# Patient Record
Sex: FEMALE | Race: WHITE | Employment: UNEMPLOYED | URBAN - METROPOLITAN AREA
[De-identification: names, ages, dates, MRNs, and addresses within clinical notes are randomized per-mention and may not be internally consistent; named-entity substitution may affect disease eponyms.]

---

## 2017-01-01 ENCOUNTER — APPOINTMENT (OUTPATIENT)
Dept: INFUSION THERAPY | Age: 62
End: 2017-01-01

## 2017-01-01 ENCOUNTER — NURSE NAVIGATOR (OUTPATIENT)
Dept: PALLATIVE CARE | Age: 62
End: 2017-01-01

## 2017-01-01 ENCOUNTER — HOSPITAL ENCOUNTER (OUTPATIENT)
Dept: CT IMAGING | Age: 62
Discharge: HOME | End: 2017-06-23
Attending: INTERNAL MEDICINE
Payer: COMMERCIAL

## 2017-01-01 ENCOUNTER — TELEPHONE (OUTPATIENT)
Dept: PALLATIVE CARE | Age: 62
End: 2017-01-01

## 2017-01-01 ENCOUNTER — DOCUMENTATION ONLY (OUTPATIENT)
Dept: PALLATIVE CARE | Age: 62
End: 2017-01-01

## 2017-01-01 ENCOUNTER — TELEPHONE (OUTPATIENT)
Dept: ONCOLOGY | Age: 62
End: 2017-01-01

## 2017-01-01 ENCOUNTER — OFFICE VISIT (OUTPATIENT)
Dept: ONCOLOGY | Age: 62
End: 2017-01-01

## 2017-01-01 ENCOUNTER — HOSPITAL ENCOUNTER (OUTPATIENT)
Dept: INTERVENTIONAL RADIOLOGY/VASCULAR | Age: 62
Discharge: HOME | End: 2017-09-20
Attending: INTERNAL MEDICINE | Admitting: RADIOLOGY
Payer: COMMERCIAL

## 2017-01-01 ENCOUNTER — APPOINTMENT (OUTPATIENT)
Dept: INFUSION THERAPY | Age: 62
End: 2017-01-01
Payer: COMMERCIAL

## 2017-01-01 ENCOUNTER — ANESTHESIA EVENT (OUTPATIENT)
Dept: ENDOSCOPY | Age: 62
DRG: 423 | End: 2017-01-01
Payer: COMMERCIAL

## 2017-01-01 ENCOUNTER — OFFICE VISIT (OUTPATIENT)
Dept: PALLATIVE CARE | Age: 62
End: 2017-01-01

## 2017-01-01 ENCOUNTER — DOCUMENTATION ONLY (OUTPATIENT)
Dept: ONCOLOGY | Age: 62
End: 2017-01-01

## 2017-01-01 ENCOUNTER — HOSPITAL ENCOUNTER (OUTPATIENT)
Dept: INFUSION THERAPY | Age: 62
Discharge: HOME | End: 2017-07-06
Payer: COMMERCIAL

## 2017-01-01 ENCOUNTER — HOSPITAL ENCOUNTER (EMERGENCY)
Age: 62
Discharge: HOME | End: 2017-07-17
Attending: EMERGENCY MEDICINE
Payer: COMMERCIAL

## 2017-01-01 ENCOUNTER — HOSPITAL ENCOUNTER (INPATIENT)
Age: 62
LOS: 6 days | Discharge: HOME | DRG: 423 | End: 2017-05-14
Attending: STUDENT IN AN ORGANIZED HEALTH CARE EDUCATION/TRAINING PROGRAM | Admitting: FAMILY MEDICINE
Payer: COMMERCIAL

## 2017-01-01 ENCOUNTER — HOSPITAL ENCOUNTER (OUTPATIENT)
Dept: INFUSION THERAPY | Age: 62
Discharge: HOME | End: 2017-08-17
Payer: COMMERCIAL

## 2017-01-01 ENCOUNTER — PATIENT MESSAGE (OUTPATIENT)
Dept: PALLATIVE CARE | Age: 62
End: 2017-01-01

## 2017-01-01 ENCOUNTER — PATIENT OUTREACH (OUTPATIENT)
Dept: ONCOLOGY | Age: 62
End: 2017-01-01

## 2017-01-01 ENCOUNTER — ANESTHESIA (OUTPATIENT)
Dept: ENDOSCOPY | Age: 62
DRG: 423 | End: 2017-01-01
Payer: COMMERCIAL

## 2017-01-01 ENCOUNTER — APPOINTMENT (OUTPATIENT)
Dept: INTERVENTIONAL RADIOLOGY/VASCULAR | Age: 62
DRG: 423 | End: 2017-01-01
Attending: NURSE PRACTITIONER
Payer: COMMERCIAL

## 2017-01-01 ENCOUNTER — CLINICAL SUPPORT (OUTPATIENT)
Dept: ONCOLOGY | Age: 62
End: 2017-01-01

## 2017-01-01 ENCOUNTER — APPOINTMENT (OUTPATIENT)
Dept: CT IMAGING | Age: 62
DRG: 423 | End: 2017-01-01
Attending: NURSE PRACTITIONER
Payer: COMMERCIAL

## 2017-01-01 ENCOUNTER — APPOINTMENT (OUTPATIENT)
Dept: CT IMAGING | Age: 62
DRG: 423 | End: 2017-01-01
Attending: STUDENT IN AN ORGANIZED HEALTH CARE EDUCATION/TRAINING PROGRAM
Payer: COMMERCIAL

## 2017-01-01 ENCOUNTER — HOSPITAL ENCOUNTER (OUTPATIENT)
Dept: INFUSION THERAPY | Age: 62
Discharge: HOME | End: 2017-06-20
Payer: COMMERCIAL

## 2017-01-01 ENCOUNTER — HOSPITAL ENCOUNTER (OUTPATIENT)
Dept: VASCULAR SURGERY | Age: 62
Discharge: HOME | End: 2017-12-05
Attending: INTERNAL MEDICINE
Payer: COMMERCIAL

## 2017-01-01 ENCOUNTER — HOSPITAL ENCOUNTER (OUTPATIENT)
Dept: INFUSION THERAPY | Age: 62
Discharge: HOME | End: 2017-07-08
Payer: COMMERCIAL

## 2017-01-01 ENCOUNTER — HOSPITAL ENCOUNTER (OUTPATIENT)
Dept: INFUSION THERAPY | Age: 62
Discharge: HOME | End: 2017-08-03
Payer: COMMERCIAL

## 2017-01-01 ENCOUNTER — APPOINTMENT (OUTPATIENT)
Dept: ULTRASOUND IMAGING | Age: 62
DRG: 423 | End: 2017-01-01
Attending: INTERNAL MEDICINE
Payer: COMMERCIAL

## 2017-01-01 ENCOUNTER — HOSPITAL ENCOUNTER (OUTPATIENT)
Dept: INFUSION THERAPY | Age: 62
Discharge: HOME | End: 2017-08-31
Payer: COMMERCIAL

## 2017-01-01 ENCOUNTER — HOSPITAL ENCOUNTER (OUTPATIENT)
Dept: INFUSION THERAPY | Age: 62
Discharge: HOME | End: 2017-07-20
Payer: COMMERCIAL

## 2017-01-01 ENCOUNTER — HOSPITAL ENCOUNTER (OUTPATIENT)
Dept: MAMMOGRAPHY | Age: 62
Discharge: HOME | End: 2017-05-05
Attending: FAMILY MEDICINE
Payer: COMMERCIAL

## 2017-01-01 ENCOUNTER — HOSPITAL ENCOUNTER (OUTPATIENT)
Dept: CT IMAGING | Age: 62
Discharge: HOME | End: 2017-06-02
Attending: INTERNAL MEDICINE
Payer: COMMERCIAL

## 2017-01-01 VITALS
SYSTOLIC BLOOD PRESSURE: 148 MMHG | HEIGHT: 67 IN | TEMPERATURE: 96.8 F | RESPIRATION RATE: 18 BRPM | WEIGHT: 185 LBS | BODY MASS INDEX: 29.03 KG/M2 | OXYGEN SATURATION: 100 % | HEART RATE: 58 BPM | DIASTOLIC BLOOD PRESSURE: 54 MMHG

## 2017-01-01 VITALS
WEIGHT: 196 LBS | SYSTOLIC BLOOD PRESSURE: 131 MMHG | HEART RATE: 67 BPM | RESPIRATION RATE: 16 BRPM | BODY MASS INDEX: 30.76 KG/M2 | OXYGEN SATURATION: 97 % | DIASTOLIC BLOOD PRESSURE: 66 MMHG | TEMPERATURE: 96.2 F | HEIGHT: 67 IN

## 2017-01-01 VITALS
HEIGHT: 67 IN | TEMPERATURE: 97.5 F | BODY MASS INDEX: 32.91 KG/M2 | SYSTOLIC BLOOD PRESSURE: 149 MMHG | RESPIRATION RATE: 16 BRPM | DIASTOLIC BLOOD PRESSURE: 82 MMHG | HEART RATE: 65 BPM | OXYGEN SATURATION: 95 % | WEIGHT: 209.7 LBS

## 2017-01-01 VITALS
SYSTOLIC BLOOD PRESSURE: 166 MMHG | TEMPERATURE: 96.6 F | BODY MASS INDEX: 32.27 KG/M2 | WEIGHT: 205.6 LBS | RESPIRATION RATE: 16 BRPM | DIASTOLIC BLOOD PRESSURE: 85 MMHG | HEART RATE: 59 BPM | HEIGHT: 67 IN | OXYGEN SATURATION: 99 %

## 2017-01-01 VITALS
DIASTOLIC BLOOD PRESSURE: 82 MMHG | OXYGEN SATURATION: 96 % | RESPIRATION RATE: 18 BRPM | TEMPERATURE: 97.1 F | HEART RATE: 77 BPM | BODY MASS INDEX: 30.07 KG/M2 | HEIGHT: 67 IN | SYSTOLIC BLOOD PRESSURE: 130 MMHG | WEIGHT: 191.6 LBS

## 2017-01-01 VITALS
RESPIRATION RATE: 12 BRPM | WEIGHT: 193.2 LBS | OXYGEN SATURATION: 95 % | SYSTOLIC BLOOD PRESSURE: 132 MMHG | HEIGHT: 67 IN | TEMPERATURE: 98.2 F | HEART RATE: 62 BPM | DIASTOLIC BLOOD PRESSURE: 78 MMHG | BODY MASS INDEX: 30.32 KG/M2

## 2017-01-01 VITALS
OXYGEN SATURATION: 98 % | DIASTOLIC BLOOD PRESSURE: 77 MMHG | TEMPERATURE: 97.9 F | SYSTOLIC BLOOD PRESSURE: 112 MMHG | WEIGHT: 197.6 LBS | RESPIRATION RATE: 16 BRPM | HEIGHT: 67 IN | HEART RATE: 67 BPM | BODY MASS INDEX: 31.01 KG/M2

## 2017-01-01 VITALS
HEART RATE: 54 BPM | DIASTOLIC BLOOD PRESSURE: 62 MMHG | BODY MASS INDEX: 30.45 KG/M2 | RESPIRATION RATE: 16 BRPM | WEIGHT: 194 LBS | HEIGHT: 67 IN | SYSTOLIC BLOOD PRESSURE: 149 MMHG | OXYGEN SATURATION: 100 %

## 2017-01-01 VITALS
TEMPERATURE: 97.9 F | WEIGHT: 192 LBS | RESPIRATION RATE: 20 BRPM | HEART RATE: 64 BPM | SYSTOLIC BLOOD PRESSURE: 116 MMHG | HEIGHT: 67 IN | BODY MASS INDEX: 30.13 KG/M2 | DIASTOLIC BLOOD PRESSURE: 76 MMHG | OXYGEN SATURATION: 98 %

## 2017-01-01 VITALS
WEIGHT: 192 LBS | HEIGHT: 67 IN | DIASTOLIC BLOOD PRESSURE: 79 MMHG | TEMPERATURE: 97.8 F | BODY MASS INDEX: 30.13 KG/M2 | RESPIRATION RATE: 18 BRPM | SYSTOLIC BLOOD PRESSURE: 134 MMHG | OXYGEN SATURATION: 97 % | HEART RATE: 73 BPM

## 2017-01-01 VITALS
RESPIRATION RATE: 16 BRPM | HEART RATE: 78 BPM | OXYGEN SATURATION: 99 % | TEMPERATURE: 98 F | WEIGHT: 193.8 LBS | DIASTOLIC BLOOD PRESSURE: 70 MMHG | HEIGHT: 67 IN | SYSTOLIC BLOOD PRESSURE: 122 MMHG | BODY MASS INDEX: 30.42 KG/M2

## 2017-01-01 VITALS
HEIGHT: 67 IN | RESPIRATION RATE: 18 BRPM | BODY MASS INDEX: 30.61 KG/M2 | SYSTOLIC BLOOD PRESSURE: 115 MMHG | WEIGHT: 195 LBS | HEART RATE: 65 BPM | DIASTOLIC BLOOD PRESSURE: 68 MMHG | OXYGEN SATURATION: 98 % | TEMPERATURE: 98.1 F

## 2017-01-01 VITALS
SYSTOLIC BLOOD PRESSURE: 99 MMHG | TEMPERATURE: 97.3 F | DIASTOLIC BLOOD PRESSURE: 71 MMHG | HEIGHT: 67 IN | BODY MASS INDEX: 29.98 KG/M2 | WEIGHT: 191 LBS | OXYGEN SATURATION: 99 % | RESPIRATION RATE: 20 BRPM | HEART RATE: 70 BPM

## 2017-01-01 VITALS
WEIGHT: 189.6 LBS | RESPIRATION RATE: 16 BRPM | SYSTOLIC BLOOD PRESSURE: 136 MMHG | BODY MASS INDEX: 29.76 KG/M2 | HEART RATE: 64 BPM | OXYGEN SATURATION: 90 % | HEIGHT: 67 IN | DIASTOLIC BLOOD PRESSURE: 80 MMHG | TEMPERATURE: 95.5 F

## 2017-01-01 VITALS
SYSTOLIC BLOOD PRESSURE: 128 MMHG | BODY MASS INDEX: 37.11 KG/M2 | OXYGEN SATURATION: 99 % | DIASTOLIC BLOOD PRESSURE: 68 MMHG | HEART RATE: 54 BPM | RESPIRATION RATE: 16 BRPM | TEMPERATURE: 96.8 F | WEIGHT: 189 LBS | HEIGHT: 60 IN

## 2017-01-01 VITALS
OXYGEN SATURATION: 98 % | DIASTOLIC BLOOD PRESSURE: 77 MMHG | TEMPERATURE: 97.8 F | SYSTOLIC BLOOD PRESSURE: 137 MMHG | RESPIRATION RATE: 18 BRPM | HEART RATE: 82 BPM

## 2017-01-01 VITALS
OXYGEN SATURATION: 98 % | BODY MASS INDEX: 30.97 KG/M2 | WEIGHT: 197.3 LBS | HEIGHT: 67 IN | SYSTOLIC BLOOD PRESSURE: 143 MMHG | RESPIRATION RATE: 16 BRPM | HEART RATE: 60 BPM | DIASTOLIC BLOOD PRESSURE: 84 MMHG | TEMPERATURE: 97.4 F

## 2017-01-01 VITALS
OXYGEN SATURATION: 98 % | HEART RATE: 54 BPM | SYSTOLIC BLOOD PRESSURE: 129 MMHG | RESPIRATION RATE: 16 BRPM | DIASTOLIC BLOOD PRESSURE: 74 MMHG | TEMPERATURE: 98 F

## 2017-01-01 VITALS
TEMPERATURE: 97.4 F | WEIGHT: 195.8 LBS | HEART RATE: 77 BPM | SYSTOLIC BLOOD PRESSURE: 120 MMHG | HEIGHT: 67 IN | RESPIRATION RATE: 20 BRPM | BODY MASS INDEX: 30.73 KG/M2 | OXYGEN SATURATION: 98 % | DIASTOLIC BLOOD PRESSURE: 72 MMHG

## 2017-01-01 VITALS
OXYGEN SATURATION: 96 % | TEMPERATURE: 98 F | SYSTOLIC BLOOD PRESSURE: 124 MMHG | WEIGHT: 192.7 LBS | RESPIRATION RATE: 16 BRPM | HEART RATE: 70 BPM | HEIGHT: 66 IN | DIASTOLIC BLOOD PRESSURE: 78 MMHG | BODY MASS INDEX: 30.97 KG/M2

## 2017-01-01 VITALS
DIASTOLIC BLOOD PRESSURE: 76 MMHG | TEMPERATURE: 97.1 F | OXYGEN SATURATION: 95 % | HEART RATE: 72 BPM | WEIGHT: 191.1 LBS | RESPIRATION RATE: 16 BRPM | HEIGHT: 67 IN | SYSTOLIC BLOOD PRESSURE: 136 MMHG | BODY MASS INDEX: 29.99 KG/M2

## 2017-01-01 VITALS
DIASTOLIC BLOOD PRESSURE: 61 MMHG | TEMPERATURE: 96.3 F | HEIGHT: 67 IN | SYSTOLIC BLOOD PRESSURE: 142 MMHG | WEIGHT: 197.6 LBS | RESPIRATION RATE: 16 BRPM | OXYGEN SATURATION: 97 % | BODY MASS INDEX: 31.01 KG/M2 | HEART RATE: 60 BPM

## 2017-01-01 VITALS — BODY MASS INDEX: 36.76 KG/M2 | WEIGHT: 188.2 LBS

## 2017-01-01 VITALS
OXYGEN SATURATION: 99 % | RESPIRATION RATE: 18 BRPM | TEMPERATURE: 98 F | SYSTOLIC BLOOD PRESSURE: 115 MMHG | HEART RATE: 68 BPM | DIASTOLIC BLOOD PRESSURE: 76 MMHG

## 2017-01-01 VITALS
WEIGHT: 187.6 LBS | RESPIRATION RATE: 16 BRPM | HEIGHT: 67 IN | OXYGEN SATURATION: 98 % | SYSTOLIC BLOOD PRESSURE: 107 MMHG | HEART RATE: 62 BPM | TEMPERATURE: 98.4 F | BODY MASS INDEX: 29.44 KG/M2 | DIASTOLIC BLOOD PRESSURE: 68 MMHG

## 2017-01-01 VITALS
SYSTOLIC BLOOD PRESSURE: 125 MMHG | HEIGHT: 67 IN | HEART RATE: 74 BPM | WEIGHT: 193 LBS | BODY MASS INDEX: 30.29 KG/M2 | DIASTOLIC BLOOD PRESSURE: 77 MMHG | OXYGEN SATURATION: 98 % | TEMPERATURE: 98.1 F | RESPIRATION RATE: 16 BRPM

## 2017-01-01 DIAGNOSIS — R10.84 GENERALIZED ABDOMINAL PAIN: ICD-10-CM

## 2017-01-01 DIAGNOSIS — K59.03 DRUG-INDUCED CONSTIPATION: ICD-10-CM

## 2017-01-01 DIAGNOSIS — R63.0 POOR APPETITE: ICD-10-CM

## 2017-01-01 DIAGNOSIS — R10.11 ABDOMINAL PAIN, RIGHT UPPER QUADRANT: ICD-10-CM

## 2017-01-01 DIAGNOSIS — T45.1X5A CINV (CHEMOTHERAPY-INDUCED NAUSEA AND VOMITING): Primary | ICD-10-CM

## 2017-01-01 DIAGNOSIS — R11.0 NAUSEA WITHOUT VOMITING: ICD-10-CM

## 2017-01-01 DIAGNOSIS — C25.1 MALIGNANT NEOPLASM OF BODY OF PANCREAS (HCC): ICD-10-CM

## 2017-01-01 DIAGNOSIS — M79.10 MYALGIA: ICD-10-CM

## 2017-01-01 DIAGNOSIS — C25.1 MALIGNANT NEOPLASM OF BODY OF PANCREAS (HCC): Primary | ICD-10-CM

## 2017-01-01 DIAGNOSIS — R10.84 ABDOMINAL PAIN, GENERALIZED: Primary | ICD-10-CM

## 2017-01-01 DIAGNOSIS — R11.2 CINV (CHEMOTHERAPY-INDUCED NAUSEA AND VOMITING): Primary | ICD-10-CM

## 2017-01-01 DIAGNOSIS — F32.A DEPRESSION, UNSPECIFIED DEPRESSION TYPE: ICD-10-CM

## 2017-01-01 DIAGNOSIS — C25.7 MALIGNANT NEOPLASM OF OTHER PARTS OF PANCREAS (HCC): ICD-10-CM

## 2017-01-01 DIAGNOSIS — M79.89 LEG SWELLING: ICD-10-CM

## 2017-01-01 DIAGNOSIS — G89.3 CANCER ASSOCIATED PAIN: ICD-10-CM

## 2017-01-01 DIAGNOSIS — C25.9 MALIGNANT NEOPLASM OF PANCREAS, UNSPECIFIED LOCATION OF MALIGNANCY (HCC): ICD-10-CM

## 2017-01-01 DIAGNOSIS — C25.7 MALIGNANT NEOPLASM OF OTHER PARTS OF PANCREAS (HCC): Primary | ICD-10-CM

## 2017-01-01 DIAGNOSIS — K86.89 PANCREATIC MASS: ICD-10-CM

## 2017-01-01 DIAGNOSIS — R53.0 NEOPLASTIC MALIGNANT RELATED FATIGUE: ICD-10-CM

## 2017-01-01 DIAGNOSIS — R10.11 ABDOMINAL PAIN, RIGHT UPPER QUADRANT: Primary | ICD-10-CM

## 2017-01-01 DIAGNOSIS — R10.10 PAIN OF UPPER ABDOMEN: ICD-10-CM

## 2017-01-01 DIAGNOSIS — R10.84 ABDOMINAL PAIN, GENERALIZED: ICD-10-CM

## 2017-01-01 DIAGNOSIS — C25.9 MALIGNANT NEOPLASM OF PANCREAS, UNSPECIFIED LOCATION OF MALIGNANCY (HCC): Primary | ICD-10-CM

## 2017-01-01 DIAGNOSIS — K59.03 DRUG INDUCED CONSTIPATION: ICD-10-CM

## 2017-01-01 DIAGNOSIS — R25.2 SPASMS OF THE HANDS OR FEET: Primary | ICD-10-CM

## 2017-01-01 DIAGNOSIS — R63.4 UNINTENTIONAL WEIGHT LOSS: ICD-10-CM

## 2017-01-01 DIAGNOSIS — R11.0 CHEMOTHERAPY-INDUCED NAUSEA: ICD-10-CM

## 2017-01-01 DIAGNOSIS — Z13.820 SCREENING FOR OSTEOPOROSIS: ICD-10-CM

## 2017-01-01 DIAGNOSIS — T45.1X5A CHEMOTHERAPY-INDUCED NAUSEA: ICD-10-CM

## 2017-01-01 LAB
ALBUMIN SERPL BCP-MCNC: 3.4 G/DL (ref 3.5–5)
ALBUMIN SERPL BCP-MCNC: 3.5 G/DL (ref 3.5–5)
ALBUMIN SERPL BCP-MCNC: 3.6 G/DL (ref 3.5–5)
ALBUMIN SERPL BCP-MCNC: 4.3 G/DL (ref 3.5–5)
ALBUMIN/GLOB SERPL: 1.2 {RATIO} (ref 1.1–2.2)
ALBUMIN/GLOB SERPL: 1.3 {RATIO} (ref 1.1–2.2)
ALBUMIN/GLOB SERPL: 1.4 {RATIO} (ref 1.1–2.2)
ALBUMIN/GLOB SERPL: 1.4 {RATIO} (ref 1.1–2.2)
ALP SERPL-CCNC: 101 U/L (ref 45–117)
ALP SERPL-CCNC: 77 U/L (ref 45–117)
ALP SERPL-CCNC: 80 U/L (ref 45–117)
ALP SERPL-CCNC: 81 U/L (ref 45–117)
ALP SERPL-CCNC: 91 U/L (ref 45–117)
ALP SERPL-CCNC: 95 U/L (ref 45–117)
ALT SERPL-CCNC: 24 U/L (ref 12–78)
ALT SERPL-CCNC: 30 U/L (ref 12–78)
ALT SERPL-CCNC: 32 U/L (ref 12–78)
ALT SERPL-CCNC: 35 U/L (ref 12–78)
ALT SERPL-CCNC: 60 U/L (ref 12–78)
ALT SERPL-CCNC: 61 U/L (ref 12–78)
ANION GAP BLD CALC-SCNC: 10 MMOL/L (ref 5–15)
ANION GAP BLD CALC-SCNC: 6 MMOL/L (ref 5–15)
ANION GAP BLD CALC-SCNC: 7 MMOL/L (ref 5–15)
ANION GAP BLD CALC-SCNC: 8 MMOL/L (ref 5–15)
APPEARANCE UR: CLEAR
AST SERPL W P-5'-P-CCNC: 10 U/L (ref 15–37)
AST SERPL W P-5'-P-CCNC: 12 U/L (ref 15–37)
AST SERPL W P-5'-P-CCNC: 15 U/L (ref 15–37)
AST SERPL W P-5'-P-CCNC: 20 U/L (ref 15–37)
AST SERPL W P-5'-P-CCNC: 23 U/L (ref 15–37)
AST SERPL W P-5'-P-CCNC: 9 U/L (ref 15–37)
BACTERIA SPEC CULT: ABNORMAL
BACTERIA URNS QL MICRO: NEGATIVE /HPF
BASOPHILS # BLD AUTO: 0 K/UL (ref 0–0.1)
BASOPHILS # BLD AUTO: 0.1 K/UL (ref 0–0.1)
BASOPHILS # BLD: 0 % (ref 0–1)
BASOPHILS # BLD: 1 % (ref 0–1)
BILIRUB DIRECT SERPL-MCNC: 0.2 MG/DL (ref 0–0.2)
BILIRUB DIRECT SERPL-MCNC: 0.2 MG/DL (ref 0–0.2)
BILIRUB SERPL-MCNC: 0.8 MG/DL (ref 0.2–1)
BILIRUB SERPL-MCNC: 0.9 MG/DL (ref 0.2–1)
BILIRUB SERPL-MCNC: 1.3 MG/DL (ref 0.2–1)
BILIRUB SERPL-MCNC: 1.4 MG/DL (ref 0.2–1)
BILIRUB UR QL: NEGATIVE
BUN SERPL-MCNC: 11 MG/DL (ref 6–20)
BUN SERPL-MCNC: 12 MG/DL (ref 6–20)
BUN SERPL-MCNC: 14 MG/DL (ref 6–20)
BUN SERPL-MCNC: 15 MG/DL (ref 6–20)
BUN SERPL-MCNC: 15 MG/DL (ref 6–20)
BUN SERPL-MCNC: 8 MG/DL (ref 6–20)
BUN SERPL-MCNC: 9 MG/DL (ref 6–20)
BUN/CREAT SERPL: 11 (ref 12–20)
BUN/CREAT SERPL: 15 (ref 12–20)
BUN/CREAT SERPL: 16 (ref 12–20)
BUN/CREAT SERPL: 17 (ref 12–20)
BUN/CREAT SERPL: 19 (ref 12–20)
BUN/CREAT SERPL: 23 (ref 12–20)
BUN/CREAT SERPL: 24 (ref 12–20)
CALCIUM SERPL-MCNC: 8.2 MG/DL (ref 8.5–10.1)
CALCIUM SERPL-MCNC: 8.3 MG/DL (ref 8.5–10.1)
CALCIUM SERPL-MCNC: 8.6 MG/DL (ref 8.5–10.1)
CALCIUM SERPL-MCNC: 8.6 MG/DL (ref 8.5–10.1)
CALCIUM SERPL-MCNC: 8.7 MG/DL (ref 8.5–10.1)
CALCIUM SERPL-MCNC: 8.9 MG/DL (ref 8.5–10.1)
CALCIUM SERPL-MCNC: 9.3 MG/DL (ref 8.5–10.1)
CANCER AG19-9 SERPL-ACNC: 178 U/ML (ref 0–35)
CANCER AG19-9 SERPL-ACNC: 286 U/ML (ref 0–35)
CC UR VC: ABNORMAL
CHLORIDE SERPL-SCNC: 100 MMOL/L (ref 97–108)
CHLORIDE SERPL-SCNC: 100 MMOL/L (ref 97–108)
CHLORIDE SERPL-SCNC: 102 MMOL/L (ref 97–108)
CHLORIDE SERPL-SCNC: 106 MMOL/L (ref 97–108)
CHLORIDE SERPL-SCNC: 97 MMOL/L (ref 97–108)
CHLORIDE SERPL-SCNC: 98 MMOL/L (ref 97–108)
CHLORIDE SERPL-SCNC: 99 MMOL/L (ref 97–108)
CK SERPL-CCNC: 168 U/L (ref 26–192)
CO2 SERPL-SCNC: 26 MMOL/L (ref 21–32)
CO2 SERPL-SCNC: 27 MMOL/L (ref 21–32)
CO2 SERPL-SCNC: 28 MMOL/L (ref 21–32)
CO2 SERPL-SCNC: 29 MMOL/L (ref 21–32)
CO2 SERPL-SCNC: 30 MMOL/L (ref 21–32)
COLOR UR: ABNORMAL
CREAT SERPL-MCNC: 0.55 MG/DL (ref 0.55–1.02)
CREAT SERPL-MCNC: 0.57 MG/DL (ref 0.55–1.02)
CREAT SERPL-MCNC: 0.63 MG/DL (ref 0.55–1.02)
CREAT SERPL-MCNC: 0.66 MG/DL (ref 0.55–1.02)
CREAT SERPL-MCNC: 0.71 MG/DL (ref 0.55–1.02)
CREAT SERPL-MCNC: 0.74 MG/DL (ref 0.55–1.02)
CREAT SERPL-MCNC: 1.04 MG/DL (ref 0.55–1.02)
DIFFERENTIAL METHOD BLD: ABNORMAL
EOSINOPHIL # BLD: 0 K/UL (ref 0–0.4)
EOSINOPHIL # BLD: 0.1 K/UL (ref 0–0.4)
EOSINOPHIL # BLD: 0.2 K/UL (ref 0–0.4)
EOSINOPHIL # BLD: 0.2 K/UL (ref 0–0.4)
EOSINOPHIL NFR BLD: 1 % (ref 0–7)
EOSINOPHIL NFR BLD: 2 % (ref 0–7)
EOSINOPHIL NFR BLD: 3 % (ref 0–7)
EOSINOPHIL NFR BLD: 3 % (ref 0–7)
EPITH CASTS URNS QL MICRO: ABNORMAL /LPF
ERYTHROCYTE [DISTWIDTH] IN BLOOD BY AUTOMATED COUNT: 12.5 % (ref 11.5–14.5)
ERYTHROCYTE [DISTWIDTH] IN BLOOD BY AUTOMATED COUNT: 12.6 % (ref 11.5–14.5)
ERYTHROCYTE [DISTWIDTH] IN BLOOD BY AUTOMATED COUNT: 13.5 % (ref 11.5–14.5)
ERYTHROCYTE [DISTWIDTH] IN BLOOD BY AUTOMATED COUNT: 14.1 % (ref 11.5–14.5)
ERYTHROCYTE [DISTWIDTH] IN BLOOD BY AUTOMATED COUNT: 14.8 % (ref 11.5–14.5)
ERYTHROCYTE [DISTWIDTH] IN BLOOD BY AUTOMATED COUNT: 14.9 % (ref 11.5–14.5)
ERYTHROCYTE [DISTWIDTH] IN BLOOD BY AUTOMATED COUNT: 15.9 % (ref 11.5–14.5)
GLOBULIN SER CALC-MCNC: 2.5 G/DL (ref 2–4)
GLOBULIN SER CALC-MCNC: 2.7 G/DL (ref 2–4)
GLOBULIN SER CALC-MCNC: 2.9 G/DL (ref 2–4)
GLOBULIN SER CALC-MCNC: 3 G/DL (ref 2–4)
GLUCOSE SERPL-MCNC: 102 MG/DL (ref 65–100)
GLUCOSE SERPL-MCNC: 106 MG/DL (ref 65–100)
GLUCOSE SERPL-MCNC: 106 MG/DL (ref 65–100)
GLUCOSE SERPL-MCNC: 119 MG/DL (ref 65–100)
GLUCOSE SERPL-MCNC: 128 MG/DL (ref 65–100)
GLUCOSE SERPL-MCNC: 132 MG/DL (ref 65–100)
GLUCOSE SERPL-MCNC: 134 MG/DL (ref 65–100)
GLUCOSE UR STRIP.AUTO-MCNC: NEGATIVE MG/DL
HCT VFR BLD AUTO: 34 % (ref 35–47)
HCT VFR BLD AUTO: 34.3 % (ref 35–47)
HCT VFR BLD AUTO: 34.8 % (ref 35–47)
HCT VFR BLD AUTO: 36.6 % (ref 35–47)
HCT VFR BLD AUTO: 39 % (ref 35–47)
HCT VFR BLD AUTO: 39 % (ref 35–47)
HCT VFR BLD AUTO: 41.6 % (ref 35–47)
HGB BLD-MCNC: 11.2 G/DL (ref 11.5–16)
HGB BLD-MCNC: 11.3 G/DL (ref 11.5–16)
HGB BLD-MCNC: 11.4 G/DL (ref 11.5–16)
HGB BLD-MCNC: 12.3 G/DL (ref 11.5–16)
HGB BLD-MCNC: 13.1 G/DL (ref 11.5–16)
HGB BLD-MCNC: 13.2 G/DL (ref 11.5–16)
HGB BLD-MCNC: 14.1 G/DL (ref 11.5–16)
HGB UR QL STRIP: NEGATIVE
HYALINE CASTS URNS QL MICRO: ABNORMAL /LPF (ref 0–5)
KETONES UR QL STRIP.AUTO: NEGATIVE MG/DL
LEUKOCYTE ESTERASE UR QL STRIP.AUTO: ABNORMAL
LIPASE SERPL-CCNC: 1400 U/L (ref 73–393)
LIPASE SERPL-CCNC: 171 U/L (ref 73–393)
LIPASE SERPL-CCNC: >3000 U/L (ref 73–393)
LYMPHOCYTES # BLD AUTO: 19 % (ref 12–49)
LYMPHOCYTES # BLD AUTO: 20 % (ref 12–49)
LYMPHOCYTES # BLD AUTO: 21 % (ref 12–49)
LYMPHOCYTES # BLD AUTO: 30 % (ref 12–49)
LYMPHOCYTES # BLD AUTO: 34 % (ref 12–49)
LYMPHOCYTES # BLD AUTO: 35 % (ref 12–49)
LYMPHOCYTES # BLD: 1.1 K/UL (ref 0.8–3.5)
LYMPHOCYTES # BLD: 1.5 K/UL (ref 0.8–3.5)
LYMPHOCYTES # BLD: 1.6 K/UL (ref 0.8–3.5)
LYMPHOCYTES # BLD: 1.8 K/UL (ref 0.8–3.5)
LYMPHOCYTES # BLD: 1.9 K/UL (ref 0.8–3.5)
LYMPHOCYTES # BLD: 2.9 K/UL (ref 0.8–3.5)
MAGNESIUM SERPL-MCNC: 2.4 MG/DL (ref 1.6–2.4)
MCH RBC QN AUTO: 29.7 PG (ref 26–34)
MCH RBC QN AUTO: 29.7 PG (ref 26–34)
MCH RBC QN AUTO: 29.9 PG (ref 26–34)
MCH RBC QN AUTO: 29.9 PG (ref 26–34)
MCH RBC QN AUTO: 30.2 PG (ref 26–34)
MCH RBC QN AUTO: 30.5 PG (ref 26–34)
MCH RBC QN AUTO: 30.5 PG (ref 26–34)
MCHC RBC AUTO-ENTMCNC: 32.8 G/DL (ref 30–36.5)
MCHC RBC AUTO-ENTMCNC: 32.9 G/DL (ref 30–36.5)
MCHC RBC AUTO-ENTMCNC: 32.9 G/DL (ref 30–36.5)
MCHC RBC AUTO-ENTMCNC: 33.6 G/DL (ref 30–36.5)
MCHC RBC AUTO-ENTMCNC: 33.6 G/DL (ref 30–36.5)
MCHC RBC AUTO-ENTMCNC: 33.8 G/DL (ref 30–36.5)
MCHC RBC AUTO-ENTMCNC: 33.9 G/DL (ref 30–36.5)
MCV RBC AUTO: 88.4 FL (ref 80–99)
MCV RBC AUTO: 89.1 FL (ref 80–99)
MCV RBC AUTO: 89.8 FL (ref 80–99)
MCV RBC AUTO: 90.1 FL (ref 80–99)
MCV RBC AUTO: 90.6 FL (ref 80–99)
MCV RBC AUTO: 90.7 FL (ref 80–99)
MCV RBC AUTO: 91.6 FL (ref 80–99)
METAMYELOCYTES NFR BLD MANUAL: 1 %
MONOCYTES # BLD: 0.4 K/UL (ref 0–1)
MONOCYTES # BLD: 0.4 K/UL (ref 0–1)
MONOCYTES # BLD: 0.5 K/UL (ref 0–1)
MONOCYTES # BLD: 0.5 K/UL (ref 0–1)
MONOCYTES # BLD: 0.6 K/UL (ref 0–1)
MONOCYTES # BLD: 0.6 K/UL (ref 0–1)
MONOCYTES NFR BLD AUTO: 10 % (ref 5–13)
MONOCYTES NFR BLD AUTO: 6 % (ref 5–13)
MONOCYTES NFR BLD AUTO: 7 % (ref 5–13)
MONOCYTES NFR BLD AUTO: 8 % (ref 5–13)
MONOCYTES NFR BLD AUTO: 8 % (ref 5–13)
MONOCYTES NFR BLD AUTO: 9 % (ref 5–13)
MYELOCYTES NFR BLD MANUAL: 2 %
NEUTS SEG # BLD: 3 K/UL (ref 1.8–8)
NEUTS SEG # BLD: 3.6 K/UL (ref 1.8–8)
NEUTS SEG # BLD: 4 K/UL (ref 1.8–8)
NEUTS SEG # BLD: 4.4 K/UL (ref 1.8–8)
NEUTS SEG # BLD: 4.6 K/UL (ref 1.8–8)
NEUTS SEG # BLD: 6.1 K/UL (ref 1.8–8)
NEUTS SEG NFR BLD AUTO: 54 % (ref 32–75)
NEUTS SEG NFR BLD AUTO: 54 % (ref 32–75)
NEUTS SEG NFR BLD AUTO: 57 % (ref 32–75)
NEUTS SEG NFR BLD AUTO: 65 % (ref 32–75)
NEUTS SEG NFR BLD AUTO: 71 % (ref 32–75)
NEUTS SEG NFR BLD AUTO: 73 % (ref 32–75)
NITRITE UR QL STRIP.AUTO: POSITIVE
PH UR STRIP: 5.5 [PH] (ref 5–8)
PLATELET # BLD AUTO: 113 K/UL (ref 150–400)
PLATELET # BLD AUTO: 133 K/UL (ref 150–400)
PLATELET # BLD AUTO: 143 K/UL (ref 150–400)
PLATELET # BLD AUTO: 150 K/UL (ref 150–400)
PLATELET # BLD AUTO: 151 K/UL (ref 150–400)
PLATELET # BLD AUTO: 161 K/UL (ref 150–400)
PLATELET # BLD AUTO: 168 K/UL (ref 150–400)
POTASSIUM SERPL-SCNC: 3.6 MMOL/L (ref 3.5–5.1)
POTASSIUM SERPL-SCNC: 3.8 MMOL/L (ref 3.5–5.1)
POTASSIUM SERPL-SCNC: 3.8 MMOL/L (ref 3.5–5.1)
POTASSIUM SERPL-SCNC: 4 MMOL/L (ref 3.5–5.1)
POTASSIUM SERPL-SCNC: 4 MMOL/L (ref 3.5–5.1)
POTASSIUM SERPL-SCNC: 4.1 MMOL/L (ref 3.5–5.1)
POTASSIUM SERPL-SCNC: 4.2 MMOL/L (ref 3.5–5.1)
PROT SERPL-MCNC: 6.1 G/DL (ref 6.4–8.2)
PROT SERPL-MCNC: 6.1 G/DL (ref 6.4–8.2)
PROT SERPL-MCNC: 6.4 G/DL (ref 6.4–8.2)
PROT SERPL-MCNC: 6.5 G/DL (ref 6.4–8.2)
PROT SERPL-MCNC: 6.5 G/DL (ref 6.4–8.2)
PROT SERPL-MCNC: 7.3 G/DL (ref 6.4–8.2)
PROT UR STRIP-MCNC: NEGATIVE MG/DL
RBC # BLD AUTO: 3.71 M/UL (ref 3.8–5.2)
RBC # BLD AUTO: 3.78 M/UL (ref 3.8–5.2)
RBC # BLD AUTO: 3.84 M/UL (ref 3.8–5.2)
RBC # BLD AUTO: 4.11 M/UL (ref 3.8–5.2)
RBC # BLD AUTO: 4.33 M/UL (ref 3.8–5.2)
RBC # BLD AUTO: 4.41 M/UL (ref 3.8–5.2)
RBC # BLD AUTO: 4.63 M/UL (ref 3.8–5.2)
RBC #/AREA URNS HPF: ABNORMAL /HPF (ref 0–5)
RBC MORPH BLD: ABNORMAL
SERVICE CMNT-IMP: ABNORMAL
SODIUM SERPL-SCNC: 133 MMOL/L (ref 136–145)
SODIUM SERPL-SCNC: 133 MMOL/L (ref 136–145)
SODIUM SERPL-SCNC: 134 MMOL/L (ref 136–145)
SODIUM SERPL-SCNC: 135 MMOL/L (ref 136–145)
SODIUM SERPL-SCNC: 135 MMOL/L (ref 136–145)
SODIUM SERPL-SCNC: 138 MMOL/L (ref 136–145)
SODIUM SERPL-SCNC: 140 MMOL/L (ref 136–145)
SP GR UR REFRACTOMETRY: 1.01 (ref 1–1.03)
TSH SERPL DL<=0.05 MIU/L-ACNC: 2.57 UIU/ML (ref 0.36–3.74)
UA: UC IF INDICATED,UAUC: ABNORMAL
UROBILINOGEN UR QL STRIP.AUTO: 0.2 EU/DL (ref 0.2–1)
WBC # BLD AUTO: 5.6 K/UL (ref 3.6–11)
WBC # BLD AUTO: 5.7 K/UL (ref 3.6–11)
WBC # BLD AUTO: 6.2 K/UL (ref 3.6–11)
WBC # BLD AUTO: 7 K/UL (ref 3.6–11)
WBC # BLD AUTO: 8.1 K/UL (ref 3.6–11)
WBC # BLD AUTO: 8.4 K/UL (ref 3.6–11)
WBC # BLD AUTO: 8.5 K/UL (ref 3.6–11)
WBC URNS QL MICRO: ABNORMAL /HPF (ref 0–4)

## 2017-01-01 PROCEDURE — C9113 INJ PANTOPRAZOLE SODIUM, VIA: HCPCS | Performed by: NURSE PRACTITIONER

## 2017-01-01 PROCEDURE — 74011250636 HC RX REV CODE- 250/636: Performed by: FAMILY MEDICINE

## 2017-01-01 PROCEDURE — 74011000250 HC RX REV CODE- 250: Performed by: NURSE PRACTITIONER

## 2017-01-01 PROCEDURE — 96375 TX/PRO/DX INJ NEW DRUG ADDON: CPT

## 2017-01-01 PROCEDURE — 96413 CHEMO IV INFUSION 1 HR: CPT

## 2017-01-01 PROCEDURE — 74011250636 HC RX REV CODE- 250/636: Performed by: NURSE PRACTITIONER

## 2017-01-01 PROCEDURE — 85027 COMPLETE CBC AUTOMATED: CPT | Performed by: NURSE PRACTITIONER

## 2017-01-01 PROCEDURE — 88305 TISSUE EXAM BY PATHOLOGIST: CPT | Performed by: FAMILY MEDICINE

## 2017-01-01 PROCEDURE — 83735 ASSAY OF MAGNESIUM: CPT | Performed by: EMERGENCY MEDICINE

## 2017-01-01 PROCEDURE — 74011000258 HC RX REV CODE- 258: Performed by: INTERNAL MEDICINE

## 2017-01-01 PROCEDURE — 76040000007: Performed by: INTERNAL MEDICINE

## 2017-01-01 PROCEDURE — 77030010507 HC ADH SKN DERMBND J&J -B

## 2017-01-01 PROCEDURE — 74011636320 HC RX REV CODE- 636/320: Performed by: STUDENT IN AN ORGANIZED HEALTH CARE EDUCATION/TRAINING PROGRAM

## 2017-01-01 PROCEDURE — 74177 CT ABD & PELVIS W/CONTRAST: CPT

## 2017-01-01 PROCEDURE — 77080 DXA BONE DENSITY AXIAL: CPT

## 2017-01-01 PROCEDURE — 74011000250 HC RX REV CODE- 250: Performed by: INTERNAL MEDICINE

## 2017-01-01 PROCEDURE — 74011250636 HC RX REV CODE- 250/636: Performed by: INTERNAL MEDICINE

## 2017-01-01 PROCEDURE — 77030012965 HC NDL HUBR BBMI -A

## 2017-01-01 PROCEDURE — 93970 EXTREMITY STUDY: CPT

## 2017-01-01 PROCEDURE — C1751 CATH, INF, PER/CENT/MIDLINE: HCPCS

## 2017-01-01 PROCEDURE — 74011250636 HC RX REV CODE- 250/636: Performed by: HOSPITALIST

## 2017-01-01 PROCEDURE — 36415 COLL VENOUS BLD VENIPUNCTURE: CPT | Performed by: INTERNAL MEDICINE

## 2017-01-01 PROCEDURE — 74011636320 HC RX REV CODE- 636/320: Performed by: HOSPITALIST

## 2017-01-01 PROCEDURE — 80053 COMPREHEN METABOLIC PANEL: CPT | Performed by: NURSE PRACTITIONER

## 2017-01-01 PROCEDURE — 74011250636 HC RX REV CODE- 250/636: Performed by: STUDENT IN AN ORGANIZED HEALTH CARE EDUCATION/TRAINING PROGRAM

## 2017-01-01 PROCEDURE — 76937 US GUIDE VASCULAR ACCESS: CPT

## 2017-01-01 PROCEDURE — 83690 ASSAY OF LIPASE: CPT | Performed by: INTERNAL MEDICINE

## 2017-01-01 PROCEDURE — 36589 REMOVAL TUNNELED CV CATH: CPT

## 2017-01-01 PROCEDURE — 86301 IMMUNOASSAY TUMOR CA 19-9: CPT | Performed by: NURSE PRACTITIONER

## 2017-01-01 PROCEDURE — 96417 CHEMO IV INFUS EACH ADDL SEQ: CPT

## 2017-01-01 PROCEDURE — 74011250636 HC RX REV CODE- 250/636

## 2017-01-01 PROCEDURE — 85025 COMPLETE CBC W/AUTO DIFF WBC: CPT | Performed by: INTERNAL MEDICINE

## 2017-01-01 PROCEDURE — 36590 REMOVAL TUNNELED CV CATH: CPT

## 2017-01-01 PROCEDURE — 80048 BASIC METABOLIC PNL TOTAL CA: CPT | Performed by: INTERNAL MEDICINE

## 2017-01-01 PROCEDURE — 65270000032 HC RM SEMIPRIVATE

## 2017-01-01 PROCEDURE — 74011250637 HC RX REV CODE- 250/637: Performed by: NURSE PRACTITIONER

## 2017-01-01 PROCEDURE — 36591 DRAW BLOOD OFF VENOUS DEVICE: CPT

## 2017-01-01 PROCEDURE — 36561 INSERT TUNNELED CV CATH: CPT

## 2017-01-01 PROCEDURE — 77030020269 IR INSERT TUNL CVC W PORT OVER 5 YEARS

## 2017-01-01 PROCEDURE — 96374 THER/PROPH/DIAG INJ IV PUSH: CPT

## 2017-01-01 PROCEDURE — 74011250636 HC RX REV CODE- 250/636: Performed by: RADIOLOGY

## 2017-01-01 PROCEDURE — 74011250637 HC RX REV CODE- 250/637: Performed by: INTERNAL MEDICINE

## 2017-01-01 PROCEDURE — 74011000250 HC RX REV CODE- 250: Performed by: RADIOLOGY

## 2017-01-01 PROCEDURE — 99152 MOD SED SAME PHYS/QHP 5/>YRS: CPT

## 2017-01-01 PROCEDURE — 99285 EMERGENCY DEPT VISIT HI MDM: CPT

## 2017-01-01 PROCEDURE — 76060000032 HC ANESTHESIA 0.5 TO 1 HR: Performed by: INTERNAL MEDICINE

## 2017-01-01 PROCEDURE — 0FBG3ZX EXCISION OF PANCREAS, PERCUTANEOUS APPROACH, DIAGNOSTIC: ICD-10-PCS | Performed by: INTERNAL MEDICINE

## 2017-01-01 PROCEDURE — 74011250637 HC RX REV CODE- 250/637: Performed by: HOSPITALIST

## 2017-01-01 PROCEDURE — 88342 IMHCHEM/IMCYTCHM 1ST ANTB: CPT | Performed by: FAMILY MEDICINE

## 2017-01-01 PROCEDURE — 74011000250 HC RX REV CODE- 250

## 2017-01-01 PROCEDURE — 85025 COMPLETE CBC W/AUTO DIFF WBC: CPT | Performed by: STUDENT IN AN ORGANIZED HEALTH CARE EDUCATION/TRAINING PROGRAM

## 2017-01-01 PROCEDURE — 77030011893 HC TY CUT DN TRIS -B

## 2017-01-01 PROCEDURE — 74011000258 HC RX REV CODE- 258: Performed by: HOSPITALIST

## 2017-01-01 PROCEDURE — 83690 ASSAY OF LIPASE: CPT | Performed by: HOSPITALIST

## 2017-01-01 PROCEDURE — 84443 ASSAY THYROID STIM HORMONE: CPT | Performed by: STUDENT IN AN ORGANIZED HEALTH CARE EDUCATION/TRAINING PROGRAM

## 2017-01-01 PROCEDURE — 36415 COLL VENOUS BLD VENIPUNCTURE: CPT | Performed by: NURSE PRACTITIONER

## 2017-01-01 PROCEDURE — 80076 HEPATIC FUNCTION PANEL: CPT | Performed by: INTERNAL MEDICINE

## 2017-01-01 PROCEDURE — 0DJ08ZZ INSPECTION OF UPPER INTESTINAL TRACT, VIA NATURAL OR ARTIFICIAL OPENING ENDOSCOPIC: ICD-10-PCS | Performed by: INTERNAL MEDICINE

## 2017-01-01 PROCEDURE — 96360 HYDRATION IV INFUSION INIT: CPT

## 2017-01-01 PROCEDURE — 88173 CYTOPATH EVAL FNA REPORT: CPT | Performed by: FAMILY MEDICINE

## 2017-01-01 PROCEDURE — 02HV33Z INSERTION OF INFUSION DEVICE INTO SUPERIOR VENA CAVA, PERCUTANEOUS APPROACH: ICD-10-PCS | Performed by: RADIOLOGY

## 2017-01-01 PROCEDURE — 80053 COMPREHEN METABOLIC PANEL: CPT | Performed by: INTERNAL MEDICINE

## 2017-01-01 PROCEDURE — 82550 ASSAY OF CK (CPK): CPT | Performed by: EMERGENCY MEDICINE

## 2017-01-01 PROCEDURE — 36415 COLL VENOUS BLD VENIPUNCTURE: CPT | Performed by: STUDENT IN AN ORGANIZED HEALTH CARE EDUCATION/TRAINING PROGRAM

## 2017-01-01 PROCEDURE — 77030031139 HC SUT VCRL2 J&J -A

## 2017-01-01 PROCEDURE — 0JH63WZ INSERTION OF TOTALLY IMPLANTABLE VASCULAR ACCESS DEVICE INTO CHEST SUBCUTANEOUS TISSUE AND FASCIA, PERCUTANEOUS APPROACH: ICD-10-PCS | Performed by: RADIOLOGY

## 2017-01-01 PROCEDURE — 07BD3ZX EXCISION OF AORTIC LYMPHATIC, PERCUTANEOUS APPROACH, DIAGNOSTIC: ICD-10-PCS | Performed by: INTERNAL MEDICINE

## 2017-01-01 PROCEDURE — 71260 CT THORAX DX C+: CPT

## 2017-01-01 PROCEDURE — 64530 N BLOCK INJ CELIAC PELUS: CPT

## 2017-01-01 PROCEDURE — 74011636320 HC RX REV CODE- 636/320: Performed by: INTERNAL MEDICINE

## 2017-01-01 PROCEDURE — 74011000258 HC RX REV CODE- 258: Performed by: STUDENT IN AN ORGANIZED HEALTH CARE EDUCATION/TRAINING PROGRAM

## 2017-01-01 PROCEDURE — 99284 EMERGENCY DEPT VISIT MOD MDM: CPT

## 2017-01-01 PROCEDURE — C1892 INTRO/SHEATH,FIXED,PEEL-AWAY: HCPCS

## 2017-01-01 PROCEDURE — 74011250636 HC RX REV CODE- 250/636: Performed by: EMERGENCY MEDICINE

## 2017-01-01 PROCEDURE — 76450000000

## 2017-01-01 PROCEDURE — 80053 COMPREHEN METABOLIC PANEL: CPT | Performed by: STUDENT IN AN ORGANIZED HEALTH CARE EDUCATION/TRAINING PROGRAM

## 2017-01-01 PROCEDURE — 74011000250 HC RX REV CODE- 250: Performed by: HOSPITALIST

## 2017-01-01 PROCEDURE — 36416 COLLJ CAPILLARY BLOOD SPEC: CPT | Performed by: HOSPITALIST

## 2017-01-01 RX ORDER — HYDROCODONE BITARTRATE AND ACETAMINOPHEN 5; 325 MG/1; MG/1
TABLET ORAL
Refills: 0 | COMMUNITY
Start: 2017-03-15 | End: 2017-01-01 | Stop reason: SDUPTHER

## 2017-01-01 RX ORDER — HEPARIN 100 UNIT/ML
SYRINGE INTRAVENOUS
Status: DISPENSED
Start: 2017-01-01 | End: 2017-01-01

## 2017-01-01 RX ORDER — SODIUM CHLORIDE 0.9 % (FLUSH) 0.9 %
10-40 SYRINGE (ML) INJECTION AS NEEDED
Status: ACTIVE | OUTPATIENT
Start: 2017-01-01 | End: 2017-01-01

## 2017-01-01 RX ORDER — DEXAMETHASONE 2 MG/1
TABLET ORAL
Qty: 60 TAB | Refills: 1 | OUTPATIENT
Start: 2017-01-01 | End: 2017-01-01 | Stop reason: SDUPTHER

## 2017-01-01 RX ORDER — SODIUM CHLORIDE 9 MG/ML
INJECTION, SOLUTION INTRAVENOUS
Status: DISCONTINUED | OUTPATIENT
Start: 2017-01-01 | End: 2017-01-01 | Stop reason: HOSPADM

## 2017-01-01 RX ORDER — KETOROLAC TROMETHAMINE 30 MG/ML
30 INJECTION, SOLUTION INTRAMUSCULAR; INTRAVENOUS
Status: COMPLETED | OUTPATIENT
Start: 2017-01-01 | End: 2017-01-01

## 2017-01-01 RX ORDER — SODIUM CHLORIDE 0.9 % (FLUSH) 0.9 %
10 SYRINGE (ML) INJECTION
Status: COMPLETED | OUTPATIENT
Start: 2017-01-01 | End: 2017-01-01

## 2017-01-01 RX ORDER — SODIUM CHLORIDE 0.9 % (FLUSH) 0.9 %
5-10 SYRINGE (ML) INJECTION AS NEEDED
Status: ACTIVE | OUTPATIENT
Start: 2017-01-01 | End: 2017-01-01

## 2017-01-01 RX ORDER — SODIUM CHLORIDE 9 MG/ML
25 INJECTION, SOLUTION INTRAVENOUS CONTINUOUS
Status: DISPENSED | OUTPATIENT
Start: 2017-01-01 | End: 2017-01-01

## 2017-01-01 RX ORDER — DIPHENHYDRAMINE HYDROCHLORIDE 50 MG/ML
25-50 INJECTION, SOLUTION INTRAMUSCULAR; INTRAVENOUS ONCE
Status: CANCELLED | OUTPATIENT
Start: 2017-01-01 | End: 2017-01-01

## 2017-01-01 RX ORDER — ONDANSETRON 2 MG/ML
8 INJECTION INTRAMUSCULAR; INTRAVENOUS ONCE
Status: DISCONTINUED | OUTPATIENT
Start: 2017-01-01 | End: 2017-01-01

## 2017-01-01 RX ORDER — SODIUM CHLORIDE 0.9 % (FLUSH) 0.9 %
5-10 SYRINGE (ML) INJECTION AS NEEDED
Status: DISCONTINUED | OUTPATIENT
Start: 2017-01-01 | End: 2017-01-01 | Stop reason: HOSPADM

## 2017-01-01 RX ORDER — HYDROMORPHONE HYDROCHLORIDE 2 MG/1
4 TABLET ORAL
Status: DISCONTINUED | OUTPATIENT
Start: 2017-01-01 | End: 2017-01-01

## 2017-01-01 RX ORDER — CEFAZOLIN SODIUM IN 0.9 % NACL 2 G/50 ML
2 INTRAVENOUS SOLUTION, PIGGYBACK (ML) INTRAVENOUS ONCE
Status: COMPLETED | OUTPATIENT
Start: 2017-01-01 | End: 2017-01-01

## 2017-01-01 RX ORDER — HYDROMORPHONE HYDROCHLORIDE 1 MG/ML
1 INJECTION, SOLUTION INTRAMUSCULAR; INTRAVENOUS; SUBCUTANEOUS
Status: DISCONTINUED | OUTPATIENT
Start: 2017-01-01 | End: 2017-01-01

## 2017-01-01 RX ORDER — MIDAZOLAM HYDROCHLORIDE 1 MG/ML
5 INJECTION, SOLUTION INTRAMUSCULAR; INTRAVENOUS
Status: DISCONTINUED | OUTPATIENT
Start: 2017-01-01 | End: 2017-01-01 | Stop reason: HOSPADM

## 2017-01-01 RX ORDER — SODIUM CHLORIDE 9 MG/ML
10 INJECTION INTRAMUSCULAR; INTRAVENOUS; SUBCUTANEOUS AS NEEDED
Status: ACTIVE | OUTPATIENT
Start: 2017-01-01 | End: 2017-01-01

## 2017-01-01 RX ORDER — ONDANSETRON 4 MG/1
4 TABLET, ORALLY DISINTEGRATING ORAL
COMMUNITY
End: 2017-01-01

## 2017-01-01 RX ORDER — FLUMAZENIL 0.1 MG/ML
0.2 INJECTION INTRAVENOUS
Status: DISCONTINUED | OUTPATIENT
Start: 2017-01-01 | End: 2017-01-01 | Stop reason: HOSPADM

## 2017-01-01 RX ORDER — OXYCODONE HYDROCHLORIDE 15 MG/1
15 TABLET ORAL
Qty: 20 TAB | Refills: 0 | Status: SHIPPED | OUTPATIENT
Start: 2017-01-01 | End: 2017-01-01 | Stop reason: SDUPTHER

## 2017-01-01 RX ORDER — DEXAMETHASONE SODIUM PHOSPHATE 4 MG/ML
8 INJECTION, SOLUTION INTRA-ARTICULAR; INTRALESIONAL; INTRAMUSCULAR; INTRAVENOUS; SOFT TISSUE ONCE
Status: COMPLETED | OUTPATIENT
Start: 2017-01-01 | End: 2017-01-01

## 2017-01-01 RX ORDER — SODIUM CHLORIDE 0.9 % (FLUSH) 0.9 %
10 SYRINGE (ML) INJECTION AS NEEDED
Status: DISCONTINUED | OUTPATIENT
Start: 2017-01-01 | End: 2017-01-01 | Stop reason: HOSPADM

## 2017-01-01 RX ORDER — CETIRIZINE HCL 10 MG
10 TABLET ORAL DAILY
Qty: 10 TAB | Refills: 0 | Status: SHIPPED | OUTPATIENT
Start: 2017-01-01 | End: 2017-01-01

## 2017-01-01 RX ORDER — DIAZEPAM 5 MG/1
5 TABLET ORAL
Qty: 60 TAB | Refills: 0 | Status: SHIPPED | OUTPATIENT
Start: 2017-01-01

## 2017-01-01 RX ORDER — SODIUM CHLORIDE 0.9 % (FLUSH) 0.9 %
10-40 SYRINGE (ML) INJECTION AS NEEDED
Status: CANCELLED | OUTPATIENT
Start: 2017-01-01 | End: 2017-01-01

## 2017-01-01 RX ORDER — HYDROMORPHONE HYDROCHLORIDE 1 MG/ML
1 INJECTION, SOLUTION INTRAMUSCULAR; INTRAVENOUS; SUBCUTANEOUS ONCE
Status: COMPLETED | OUTPATIENT
Start: 2017-01-01 | End: 2017-01-01

## 2017-01-01 RX ORDER — LIDOCAINE HYDROCHLORIDE 20 MG/ML
20 INJECTION, SOLUTION INFILTRATION; PERINEURAL
Status: COMPLETED | OUTPATIENT
Start: 2017-01-01 | End: 2017-01-01

## 2017-01-01 RX ORDER — OXYCODONE HYDROCHLORIDE 15 MG/1
15 TABLET ORAL
Qty: 180 TAB | Refills: 0 | Status: SHIPPED | OUTPATIENT
Start: 2017-01-01 | End: 2017-01-01 | Stop reason: SDUPTHER

## 2017-01-01 RX ORDER — CHOLECALCIFEROL (VITAMIN D3) 125 MCG
300 CAPSULE ORAL DAILY
COMMUNITY
End: 2017-01-01 | Stop reason: ALTCHOICE

## 2017-01-01 RX ORDER — HYDROMORPHONE HYDROCHLORIDE 2 MG/1
2 TABLET ORAL
Status: DISCONTINUED | OUTPATIENT
Start: 2017-01-01 | End: 2017-01-01

## 2017-01-01 RX ORDER — MORPHINE SULFATE 15 MG/1
15 TABLET, FILM COATED, EXTENDED RELEASE ORAL EVERY 12 HOURS
Qty: 60 TAB | Refills: 0 | Status: SHIPPED | OUTPATIENT
Start: 2017-01-01 | End: 2017-01-01 | Stop reason: SDUPTHER

## 2017-01-01 RX ORDER — IBUPROFEN 400 MG/1
400 TABLET ORAL
Qty: 9 TAB | Refills: 0 | Status: SHIPPED | OUTPATIENT
Start: 2017-01-01 | End: 2017-01-01

## 2017-01-01 RX ORDER — FENTANYL CITRATE 50 UG/ML
100 INJECTION, SOLUTION INTRAMUSCULAR; INTRAVENOUS
Status: DISCONTINUED | OUTPATIENT
Start: 2017-01-01 | End: 2017-01-01 | Stop reason: HOSPADM

## 2017-01-01 RX ORDER — ACETAMINOPHEN 325 MG/1
650 TABLET ORAL
Status: DISCONTINUED | OUTPATIENT
Start: 2017-01-01 | End: 2017-01-01 | Stop reason: HOSPADM

## 2017-01-01 RX ORDER — EPINEPHRINE 0.1 MG/ML
1 INJECTION INTRACARDIAC; INTRAVENOUS
Status: DISCONTINUED | OUTPATIENT
Start: 2017-01-01 | End: 2017-01-01 | Stop reason: HOSPADM

## 2017-01-01 RX ORDER — SIMETHICONE 80 MG
80 TABLET,CHEWABLE ORAL
Qty: 80 TAB | Refills: 3 | Status: SHIPPED | OUTPATIENT
Start: 2017-01-01 | End: 2017-01-01

## 2017-01-01 RX ORDER — SODIUM CHLORIDE 0.9 % (FLUSH) 0.9 %
5-10 SYRINGE (ML) INJECTION EVERY 8 HOURS
Status: DISCONTINUED | OUTPATIENT
Start: 2017-01-01 | End: 2017-01-01 | Stop reason: HOSPADM

## 2017-01-01 RX ORDER — MORPHINE SULFATE 15 MG/1
15 TABLET, FILM COATED, EXTENDED RELEASE ORAL 2 TIMES DAILY
Qty: 60 TAB | Refills: 0 | Status: SHIPPED | OUTPATIENT
Start: 2017-01-01 | End: 2017-01-01 | Stop reason: DRUGHIGH

## 2017-01-01 RX ORDER — ONDANSETRON 2 MG/ML
4 INJECTION INTRAMUSCULAR; INTRAVENOUS
Status: DISCONTINUED | OUTPATIENT
Start: 2017-01-01 | End: 2017-01-01 | Stop reason: HOSPADM

## 2017-01-01 RX ORDER — PROMETHAZINE HYDROCHLORIDE 25 MG/1
25 SUPPOSITORY RECTAL
Qty: 10 SUPPOSITORY | Refills: 0 | Status: SHIPPED | OUTPATIENT
Start: 2017-01-01 | End: 2017-01-01

## 2017-01-01 RX ORDER — HEPARIN 100 UNIT/ML
500 SYRINGE INTRAVENOUS AS NEEDED
Status: ACTIVE | OUTPATIENT
Start: 2017-01-01 | End: 2017-01-01

## 2017-01-01 RX ORDER — LORAZEPAM 2 MG/ML
1 INJECTION INTRAMUSCULAR
Status: COMPLETED | OUTPATIENT
Start: 2017-01-01 | End: 2017-01-01

## 2017-01-01 RX ORDER — FENTANYL CITRATE 50 UG/ML
25 INJECTION, SOLUTION INTRAMUSCULAR; INTRAVENOUS
Status: DISCONTINUED | OUTPATIENT
Start: 2017-01-01 | End: 2017-01-01

## 2017-01-01 RX ORDER — OXYCODONE HYDROCHLORIDE 15 MG/1
TABLET ORAL
Qty: 180 TAB | Refills: 0 | Status: SHIPPED | OUTPATIENT
Start: 2017-01-01 | End: 2017-01-01 | Stop reason: SDUPTHER

## 2017-01-01 RX ORDER — PROCHLORPERAZINE MALEATE 10 MG
10 TABLET ORAL
Qty: 60 TAB | Refills: 1 | OUTPATIENT
Start: 2017-01-01 | End: 2017-01-01

## 2017-01-01 RX ORDER — PROPOFOL 10 MG/ML
INJECTION, EMULSION INTRAVENOUS AS NEEDED
Status: DISCONTINUED | OUTPATIENT
Start: 2017-01-01 | End: 2017-01-01 | Stop reason: HOSPADM

## 2017-01-01 RX ORDER — NYSTATIN AND TRIAMCINOLONE ACETONIDE 100000; 1 [USP'U]/G; MG/G
CREAM TOPICAL
Refills: 2 | COMMUNITY
Start: 2017-01-01 | End: 2017-01-01

## 2017-01-01 RX ORDER — PROCHLORPERAZINE MALEATE 10 MG
5 TABLET ORAL
Status: CANCELLED | OUTPATIENT
Start: 2017-01-01

## 2017-01-01 RX ORDER — DIAZEPAM 5 MG/1
5 TABLET ORAL
Qty: 60 TAB | Refills: 0 | Status: SHIPPED | OUTPATIENT
Start: 2017-01-01 | End: 2017-01-01 | Stop reason: SDUPTHER

## 2017-01-01 RX ORDER — LIDOCAINE HYDROCHLORIDE AND EPINEPHRINE 10; 10 MG/ML; UG/ML
20 INJECTION, SOLUTION INFILTRATION; PERINEURAL
Status: COMPLETED | OUTPATIENT
Start: 2017-01-01 | End: 2017-01-01

## 2017-01-01 RX ORDER — MORPHINE SULFATE 15 MG/1
15 TABLET, FILM COATED, EXTENDED RELEASE ORAL 3 TIMES DAILY
Qty: 90 TAB | Refills: 0 | Status: CANCELLED | OUTPATIENT
Start: 2017-01-01

## 2017-01-01 RX ORDER — HYDROCODONE BITARTRATE AND ACETAMINOPHEN 5; 325 MG/1; MG/1
1 TABLET ORAL
Qty: 120 TAB | Refills: 0 | Status: SHIPPED | OUTPATIENT
Start: 2017-01-01 | End: 2017-01-01 | Stop reason: CLARIF

## 2017-01-01 RX ORDER — NALOXONE HYDROCHLORIDE 0.4 MG/ML
0.4 INJECTION, SOLUTION INTRAMUSCULAR; INTRAVENOUS; SUBCUTANEOUS
Status: DISCONTINUED | OUTPATIENT
Start: 2017-01-01 | End: 2017-01-01 | Stop reason: HOSPADM

## 2017-01-01 RX ORDER — SULFAMETHOXAZOLE AND TRIMETHOPRIM 800; 160 MG/1; MG/1
1 TABLET ORAL 2 TIMES DAILY
Qty: 10 TAB | Refills: 0 | Status: SHIPPED | OUTPATIENT
Start: 2017-01-01 | End: 2017-01-01

## 2017-01-01 RX ORDER — SODIUM CHLORIDE 9 MG/ML
100 INJECTION, SOLUTION INTRAVENOUS CONTINUOUS
Status: DISCONTINUED | OUTPATIENT
Start: 2017-01-01 | End: 2017-01-01

## 2017-01-01 RX ORDER — MORPHINE SULFATE 15 MG/1
15 TABLET, FILM COATED, EXTENDED RELEASE ORAL EVERY 12 HOURS
Qty: 20 TAB | Refills: 0 | Status: SHIPPED | OUTPATIENT
Start: 2017-01-01 | End: 2017-01-01 | Stop reason: SDUPTHER

## 2017-01-01 RX ORDER — MORPHINE SULFATE 15 MG/1
15 TABLET, FILM COATED, EXTENDED RELEASE ORAL 3 TIMES DAILY
Qty: 90 TAB | Refills: 0 | Status: SHIPPED | OUTPATIENT
Start: 2017-01-01 | End: 2017-01-01 | Stop reason: SDUPTHER

## 2017-01-01 RX ORDER — PROCHLORPERAZINE MALEATE 10 MG
10 TABLET ORAL
Qty: 20 TAB | Refills: 1 | Status: SHIPPED | OUTPATIENT
Start: 2017-01-01 | End: 2017-01-01

## 2017-01-01 RX ORDER — HEPARIN 100 UNIT/ML
500 SYRINGE INTRAVENOUS AS NEEDED
Status: CANCELLED | OUTPATIENT
Start: 2017-01-01 | End: 2017-01-01

## 2017-01-01 RX ORDER — DEXAMETHASONE 2 MG/1
0.5 TABLET ORAL
Qty: 30 TAB | Refills: 1 | Status: SHIPPED | OUTPATIENT
Start: 2017-01-01 | End: 2017-01-01 | Stop reason: SDUPTHER

## 2017-01-01 RX ORDER — HYDROMORPHONE HYDROCHLORIDE 1 MG/ML
1 INJECTION, SOLUTION INTRAMUSCULAR; INTRAVENOUS; SUBCUTANEOUS
Status: COMPLETED | OUTPATIENT
Start: 2017-01-01 | End: 2017-01-01

## 2017-01-01 RX ORDER — CEFAZOLIN SODIUM IN 0.9 % NACL 2 G/50 ML
INTRAVENOUS SOLUTION, PIGGYBACK (ML) INTRAVENOUS
Status: COMPLETED
Start: 2017-01-01 | End: 2017-01-01

## 2017-01-01 RX ORDER — DEXAMETHASONE SODIUM PHOSPHATE 4 MG/ML
8 INJECTION, SOLUTION INTRA-ARTICULAR; INTRALESIONAL; INTRAMUSCULAR; INTRAVENOUS; SOFT TISSUE ONCE
Status: ACTIVE | OUTPATIENT
Start: 2017-01-01 | End: 2017-01-01

## 2017-01-01 RX ORDER — FENTANYL CITRATE 50 UG/ML
200 INJECTION, SOLUTION INTRAMUSCULAR; INTRAVENOUS
Status: DISCONTINUED | OUTPATIENT
Start: 2017-01-01 | End: 2017-01-01 | Stop reason: HOSPADM

## 2017-01-01 RX ORDER — DIAZEPAM 5 MG/1
5 TABLET ORAL
Qty: 30 TAB | Refills: 0 | OUTPATIENT
Start: 2017-01-01 | End: 2017-01-01 | Stop reason: SDUPTHER

## 2017-01-01 RX ORDER — CLOTRIMAZOLE 1 %
CREAM WITH APPLICATOR VAGINAL
Refills: 3 | COMMUNITY
Start: 2017-01-01 | End: 2017-01-01

## 2017-01-01 RX ORDER — ONDANSETRON 2 MG/ML
INJECTION INTRAMUSCULAR; INTRAVENOUS
Status: DISCONTINUED
Start: 2017-01-01 | End: 2017-01-01

## 2017-01-01 RX ORDER — HEPARIN 100 UNIT/ML
SYRINGE INTRAVENOUS
Status: COMPLETED
Start: 2017-01-01 | End: 2017-01-01

## 2017-01-01 RX ORDER — BISMUTH SUBSALICYLATE 262 MG
1 TABLET,CHEWABLE ORAL DAILY
COMMUNITY

## 2017-01-01 RX ORDER — HEPARIN 100 UNIT/ML
300 SYRINGE INTRAVENOUS AS NEEDED
Status: DISCONTINUED | OUTPATIENT
Start: 2017-01-01 | End: 2017-01-01 | Stop reason: HOSPADM

## 2017-01-01 RX ORDER — AMOXICILLIN 250 MG
2 CAPSULE ORAL 3 TIMES DAILY
COMMUNITY

## 2017-01-01 RX ORDER — LIDOCAINE HYDROCHLORIDE 20 MG/ML
INJECTION, SOLUTION EPIDURAL; INFILTRATION; INTRACAUDAL; PERINEURAL AS NEEDED
Status: DISCONTINUED | OUTPATIENT
Start: 2017-01-01 | End: 2017-01-01 | Stop reason: HOSPADM

## 2017-01-01 RX ORDER — MORPHINE SULFATE 15 MG/1
15 TABLET, FILM COATED, EXTENDED RELEASE ORAL EVERY 12 HOURS
Status: DISCONTINUED | OUTPATIENT
Start: 2017-01-01 | End: 2017-01-01 | Stop reason: HOSPADM

## 2017-01-01 RX ORDER — FENTANYL CITRATE 50 UG/ML
200 INJECTION, SOLUTION INTRAMUSCULAR; INTRAVENOUS
Status: DISCONTINUED | OUTPATIENT
Start: 2017-01-01 | End: 2017-01-01

## 2017-01-01 RX ORDER — LORATADINE 10 MG/1
10 TABLET ORAL
COMMUNITY

## 2017-01-01 RX ORDER — LORAZEPAM 2 MG/ML
.5-1 INJECTION INTRAMUSCULAR
Status: DISCONTINUED | OUTPATIENT
Start: 2017-01-01 | End: 2017-01-01 | Stop reason: HOSPADM

## 2017-01-01 RX ORDER — MIDAZOLAM HYDROCHLORIDE 1 MG/ML
5 INJECTION, SOLUTION INTRAMUSCULAR; INTRAVENOUS
Status: DISCONTINUED | OUTPATIENT
Start: 2017-01-01 | End: 2017-01-01

## 2017-01-01 RX ORDER — ONDANSETRON 2 MG/ML
4 INJECTION INTRAMUSCULAR; INTRAVENOUS AS NEEDED
Status: DISCONTINUED | OUTPATIENT
Start: 2017-01-01 | End: 2017-01-01 | Stop reason: HOSPADM

## 2017-01-01 RX ORDER — FACIAL-BODY WIPES
10 EACH TOPICAL DAILY
Qty: 30 SUPPOSITORY | Refills: 2 | Status: SHIPPED | OUTPATIENT
Start: 2017-01-01

## 2017-01-01 RX ORDER — HYDROCODONE BITARTRATE AND ACETAMINOPHEN 5; 325 MG/1; MG/1
TABLET ORAL
Refills: 0 | COMMUNITY
Start: 2017-01-01 | End: 2017-01-01

## 2017-01-01 RX ORDER — AMOXICILLIN 250 MG
1 CAPSULE ORAL
Qty: 30 TAB | Refills: 0 | Status: SHIPPED | OUTPATIENT
Start: 2017-01-01 | End: 2017-01-01

## 2017-01-01 RX ORDER — DEXAMETHASONE 1 MG/1
TABLET ORAL
Qty: 20 TAB | Refills: 0 | Status: SHIPPED | OUTPATIENT
Start: 2017-01-01 | End: 2018-01-01 | Stop reason: SDUPTHER

## 2017-01-01 RX ORDER — DIAZEPAM 5 MG/1
5 TABLET ORAL
Qty: 12 TAB | Refills: 0 | Status: SHIPPED | OUTPATIENT
Start: 2017-01-01 | End: 2017-01-01 | Stop reason: SDUPTHER

## 2017-01-01 RX ORDER — OXYCODONE HYDROCHLORIDE 15 MG/1
15 TABLET ORAL
Qty: 120 TAB | Refills: 0 | Status: SHIPPED | OUTPATIENT
Start: 2017-01-01 | End: 2017-01-01 | Stop reason: SDUPTHER

## 2017-01-01 RX ORDER — PROCHLORPERAZINE MALEATE 10 MG
25 TABLET ORAL
COMMUNITY
End: 2017-01-01 | Stop reason: SDUPTHER

## 2017-01-01 RX ORDER — MORPHINE SULFATE 30 MG/1
30 TABLET, FILM COATED, EXTENDED RELEASE ORAL EVERY 8 HOURS
Qty: 90 TAB | Refills: 0 | Status: SHIPPED | OUTPATIENT
Start: 2017-01-01 | End: 2017-01-01 | Stop reason: SDUPTHER

## 2017-01-01 RX ORDER — MORPHINE SULFATE 15 MG/1
15 TABLET, FILM COATED, EXTENDED RELEASE ORAL ONCE
Status: COMPLETED | OUTPATIENT
Start: 2017-01-01 | End: 2017-01-01

## 2017-01-01 RX ORDER — AMOXICILLIN 250 MG
1 CAPSULE ORAL
Status: DISCONTINUED | OUTPATIENT
Start: 2017-01-01 | End: 2017-01-01 | Stop reason: HOSPADM

## 2017-01-01 RX ORDER — CALC/MAG/B COMPLEX/D3/HERB 61
15 TABLET ORAL DAILY
Qty: 30 CAP | Refills: 3 | Status: SHIPPED | OUTPATIENT
Start: 2017-01-01 | End: 2018-01-01 | Stop reason: SDUPTHER

## 2017-01-01 RX ORDER — SODIUM CHLORIDE 9 MG/ML
10 INJECTION INTRAMUSCULAR; INTRAVENOUS; SUBCUTANEOUS AS NEEDED
Status: CANCELLED | OUTPATIENT
Start: 2017-01-01 | End: 2017-01-01

## 2017-01-01 RX ORDER — NYSTATIN AND TRIAMCINOLONE ACETONIDE 100000; 1 [USP'U]/G; MG/G
CREAM TOPICAL
COMMUNITY
Start: 2017-01-01 | End: 2017-01-01

## 2017-01-01 RX ORDER — LACTULOSE 10 G/15ML
20 SOLUTION ORAL; RECTAL 3 TIMES DAILY
Qty: 480 ML | Refills: 1 | Status: SHIPPED | OUTPATIENT
Start: 2017-01-01

## 2017-01-01 RX ORDER — DEXAMETHASONE 2 MG/1
2 TABLET ORAL DAILY
Qty: 60 TAB | Refills: 3 | Status: SHIPPED | OUTPATIENT
Start: 2017-01-01 | End: 2017-01-01 | Stop reason: DRUGHIGH

## 2017-01-01 RX ORDER — PANCRELIPASE 36000; 180000; 114000 [USP'U]/1; [USP'U]/1; [USP'U]/1
CAPSULE, DELAYED RELEASE PELLETS ORAL
Refills: 4 | COMMUNITY
Start: 2017-01-01 | End: 2017-01-01

## 2017-01-01 RX ORDER — OXYCODONE HYDROCHLORIDE 5 MG/1
15 TABLET ORAL
Status: DISCONTINUED | OUTPATIENT
Start: 2017-01-01 | End: 2017-01-01

## 2017-01-01 RX ORDER — HEPARIN 100 UNIT/ML
300 SYRINGE INTRAVENOUS EVERY 8 HOURS
Status: DISCONTINUED | OUTPATIENT
Start: 2017-01-01 | End: 2017-01-01

## 2017-01-01 RX ORDER — DIAZEPAM 5 MG/1
5 TABLET ORAL
Qty: 30 TAB | Refills: 0 | Status: SHIPPED | OUTPATIENT
Start: 2017-01-01 | End: 2017-01-01 | Stop reason: SDUPTHER

## 2017-01-01 RX ORDER — PROCHLORPERAZINE EDISYLATE 5 MG/ML
10 INJECTION INTRAMUSCULAR; INTRAVENOUS
Status: DISCONTINUED | OUTPATIENT
Start: 2017-01-01 | End: 2017-01-01 | Stop reason: SDUPTHER

## 2017-01-01 RX ORDER — DEXTROMETHORPHAN/PSEUDOEPHED 2.5-7.5/.8
1.2 DROPS ORAL
Status: DISCONTINUED | OUTPATIENT
Start: 2017-01-01 | End: 2017-01-01 | Stop reason: HOSPADM

## 2017-01-01 RX ORDER — POLYETHYLENE GLYCOL 3350 17 G/17G
17 POWDER, FOR SOLUTION ORAL AS NEEDED
COMMUNITY

## 2017-01-01 RX ORDER — DEXAMETHASONE 2 MG/1
0.5 TABLET ORAL
Qty: 30 TAB | Refills: 1 | Status: CANCELLED | OUTPATIENT
Start: 2017-01-01

## 2017-01-01 RX ORDER — MIDAZOLAM HYDROCHLORIDE 1 MG/ML
.25-1 INJECTION, SOLUTION INTRAMUSCULAR; INTRAVENOUS
Status: DISCONTINUED | OUTPATIENT
Start: 2017-01-01 | End: 2017-01-01 | Stop reason: HOSPADM

## 2017-01-01 RX ORDER — MORPHINE SULFATE 30 MG/1
30 TABLET, FILM COATED, EXTENDED RELEASE ORAL EVERY 8 HOURS
Qty: 90 TAB | Refills: 0 | Status: SHIPPED | OUTPATIENT
Start: 2017-01-01 | End: 2018-01-01 | Stop reason: SDUPTHER

## 2017-01-01 RX ORDER — SODIUM CHLORIDE 9 MG/ML
25 INJECTION, SOLUTION INTRAVENOUS CONTINUOUS
Status: DISCONTINUED | OUTPATIENT
Start: 2017-01-01 | End: 2017-01-01 | Stop reason: HOSPADM

## 2017-01-01 RX ORDER — ATROPINE SULFATE 0.1 MG/ML
0.5 INJECTION INTRAVENOUS
Status: DISCONTINUED | OUTPATIENT
Start: 2017-01-01 | End: 2017-01-01 | Stop reason: HOSPADM

## 2017-01-01 RX ORDER — PROCHLORPERAZINE MALEATE 10 MG
5 TABLET ORAL
COMMUNITY

## 2017-01-01 RX ORDER — OXYCODONE HYDROCHLORIDE 15 MG/1
TABLET ORAL
Qty: 180 TAB | Refills: 0 | Status: SHIPPED | OUTPATIENT
Start: 2017-01-01 | End: 2018-01-01 | Stop reason: SDUPTHER

## 2017-01-01 RX ORDER — SODIUM CHLORIDE 9 MG/ML
500 INJECTION, SOLUTION INTRAVENOUS CONTINUOUS
Status: DISCONTINUED | OUTPATIENT
Start: 2017-01-01 | End: 2017-01-01 | Stop reason: HOSPADM

## 2017-01-01 RX ORDER — DEXAMETHASONE 2 MG/1
TABLET ORAL
Qty: 60 TAB | Refills: 1 | Status: CANCELLED | OUTPATIENT
Start: 2017-01-01

## 2017-01-01 RX ORDER — OXYCODONE HYDROCHLORIDE 5 MG/1
15 TABLET ORAL
Status: DISCONTINUED | OUTPATIENT
Start: 2017-01-01 | End: 2017-01-01 | Stop reason: HOSPADM

## 2017-01-01 RX ORDER — PROCHLORPERAZINE EDISYLATE 5 MG/ML
10 INJECTION INTRAMUSCULAR; INTRAVENOUS ONCE
Status: DISCONTINUED | OUTPATIENT
Start: 2017-01-01 | End: 2017-01-01 | Stop reason: SDUPTHER

## 2017-01-01 RX ORDER — SODIUM CHLORIDE 0.9 % (FLUSH) 0.9 %
5-10 SYRINGE (ML) INJECTION EVERY 8 HOURS
Status: COMPLETED | OUTPATIENT
Start: 2017-01-01 | End: 2017-01-01

## 2017-01-01 RX ORDER — DEXAMETHASONE 2 MG/1
2 TABLET ORAL
Qty: 30 TAB | Refills: 1 | Status: SHIPPED | OUTPATIENT
Start: 2017-01-01 | End: 2017-01-01 | Stop reason: SDUPTHER

## 2017-01-01 RX ORDER — ENOXAPARIN SODIUM 100 MG/ML
40 INJECTION SUBCUTANEOUS EVERY 24 HOURS
Status: DISCONTINUED | OUTPATIENT
Start: 2017-01-01 | End: 2017-01-01 | Stop reason: HOSPADM

## 2017-01-01 RX ORDER — ADHESIVE BANDAGE
30 BANDAGE TOPICAL DAILY PRN
COMMUNITY
End: 2017-01-01

## 2017-01-01 RX ORDER — DIPHENHYDRAMINE HYDROCHLORIDE 50 MG/ML
INJECTION, SOLUTION INTRAMUSCULAR; INTRAVENOUS
Status: COMPLETED
Start: 2017-01-01 | End: 2017-01-01

## 2017-01-01 RX ADMIN — HYDROMORPHONE HYDROCHLORIDE 1 MG: 1 INJECTION, SOLUTION INTRAMUSCULAR; INTRAVENOUS; SUBCUTANEOUS at 04:05

## 2017-01-01 RX ADMIN — Medication 10 ML: at 06:01

## 2017-01-01 RX ADMIN — SODIUM CHLORIDE 10 ML: 9 INJECTION INTRAMUSCULAR; INTRAVENOUS; SUBCUTANEOUS at 09:57

## 2017-01-01 RX ADMIN — FENTANYL CITRATE 25 MCG: 50 INJECTION, SOLUTION INTRAMUSCULAR; INTRAVENOUS at 13:43

## 2017-01-01 RX ADMIN — HYDROMORPHONE HYDROCHLORIDE 1 MG: 1 INJECTION, SOLUTION INTRAMUSCULAR; INTRAVENOUS; SUBCUTANEOUS at 09:58

## 2017-01-01 RX ADMIN — FENTANYL CITRATE 50 MCG: 50 INJECTION, SOLUTION INTRAMUSCULAR; INTRAVENOUS at 09:05

## 2017-01-01 RX ADMIN — Medication 10 ML: at 21:26

## 2017-01-01 RX ADMIN — SODIUM CHLORIDE 1600 MG: 900 INJECTION, SOLUTION INTRAVENOUS at 14:09

## 2017-01-01 RX ADMIN — LIDOCAINE HYDROCHLORIDE 400 MG: 20 INJECTION, SOLUTION INFILTRATION; PERINEURAL at 09:42

## 2017-01-01 RX ADMIN — SODIUM CHLORIDE 40 MG: 9 INJECTION INTRAMUSCULAR; INTRAVENOUS; SUBCUTANEOUS at 09:58

## 2017-01-01 RX ADMIN — HYDROMORPHONE HYDROCHLORIDE 1 MG: 1 INJECTION, SOLUTION INTRAMUSCULAR; INTRAVENOUS; SUBCUTANEOUS at 20:31

## 2017-01-01 RX ADMIN — Medication 500 UNITS: at 15:21

## 2017-01-01 RX ADMIN — SODIUM CHLORIDE 100 ML/HR: 900 INJECTION, SOLUTION INTRAVENOUS at 08:03

## 2017-01-01 RX ADMIN — SODIUM CHLORIDE 100 ML/HR: 900 INJECTION, SOLUTION INTRAVENOUS at 00:12

## 2017-01-01 RX ADMIN — HYDROMORPHONE HYDROCHLORIDE 1 MG: 1 INJECTION, SOLUTION INTRAMUSCULAR; INTRAVENOUS; SUBCUTANEOUS at 22:19

## 2017-01-01 RX ADMIN — KETOROLAC TROMETHAMINE 30 MG: 30 INJECTION, SOLUTION INTRAMUSCULAR at 02:35

## 2017-01-01 RX ADMIN — HYDROMORPHONE HYDROCHLORIDE 2 MG: 2 TABLET ORAL at 19:57

## 2017-01-01 RX ADMIN — SODIUM CHLORIDE 25 ML/HR: 900 INJECTION, SOLUTION INTRAVENOUS at 11:31

## 2017-01-01 RX ADMIN — Medication 10 ML: at 04:14

## 2017-01-01 RX ADMIN — Medication 10 ML: at 16:19

## 2017-01-01 RX ADMIN — MIDAZOLAM HYDROCHLORIDE 1 MG: 1 INJECTION, SOLUTION INTRAMUSCULAR; INTRAVENOUS at 09:43

## 2017-01-01 RX ADMIN — Medication 10 ML: at 15:29

## 2017-01-01 RX ADMIN — Medication 10 ML: at 08:51

## 2017-01-01 RX ADMIN — DEXAMETHASONE SODIUM PHOSPHATE 8 MG: 4 INJECTION, SOLUTION INTRA-ARTICULAR; INTRALESIONAL; INTRAMUSCULAR; INTRAVENOUS; SOFT TISSUE at 12:00

## 2017-01-01 RX ADMIN — PROPOFOL 50 MG: 10 INJECTION, EMULSION INTRAVENOUS at 11:09

## 2017-01-01 RX ADMIN — SODIUM CHLORIDE 1000 ML: 900 INJECTION, SOLUTION INTRAVENOUS at 06:08

## 2017-01-01 RX ADMIN — MIDAZOLAM HYDROCHLORIDE 1 MG: 1 INJECTION, SOLUTION INTRAMUSCULAR; INTRAVENOUS at 13:08

## 2017-01-01 RX ADMIN — MIDAZOLAM HYDROCHLORIDE 1 MG: 1 INJECTION, SOLUTION INTRAMUSCULAR; INTRAVENOUS at 08:40

## 2017-01-01 RX ADMIN — SODIUM CHLORIDE 100 ML/HR: 900 INJECTION, SOLUTION INTRAVENOUS at 03:18

## 2017-01-01 RX ADMIN — SODIUM CHLORIDE 10 MG: 9 INJECTION INTRAMUSCULAR; INTRAVENOUS; SUBCUTANEOUS at 14:43

## 2017-01-01 RX ADMIN — MIDAZOLAM HYDROCHLORIDE 1 MG: 1 INJECTION, SOLUTION INTRAMUSCULAR; INTRAVENOUS at 09:04

## 2017-01-01 RX ADMIN — IOPAMIDOL 100 ML: 755 INJECTION, SOLUTION INTRAVENOUS at 16:19

## 2017-01-01 RX ADMIN — SODIUM CHLORIDE 10 MG: 9 INJECTION INTRAMUSCULAR; INTRAVENOUS; SUBCUTANEOUS at 21:48

## 2017-01-01 RX ADMIN — Medication 10 ML: at 21:48

## 2017-01-01 RX ADMIN — FENTANYL CITRATE 25 MCG: 50 INJECTION, SOLUTION INTRAMUSCULAR; INTRAVENOUS at 13:49

## 2017-01-01 RX ADMIN — OXYCODONE HYDROCHLORIDE 15 MG: 5 TABLET ORAL at 13:15

## 2017-01-01 RX ADMIN — Medication 10 ML: at 13:36

## 2017-01-01 RX ADMIN — Medication 10 ML: at 22:00

## 2017-01-01 RX ADMIN — PACLITAXEL 200 MG: 100 INJECTION, POWDER, LYOPHILIZED, FOR SUSPENSION INTRAVENOUS at 13:43

## 2017-01-01 RX ADMIN — SODIUM CHLORIDE 25 ML/HR: 900 INJECTION, SOLUTION INTRAVENOUS at 12:00

## 2017-01-01 RX ADMIN — SODIUM CHLORIDE 100 ML/HR: 900 INJECTION, SOLUTION INTRAVENOUS at 14:12

## 2017-01-01 RX ADMIN — SODIUM CHLORIDE 100 ML/HR: 900 INJECTION, SOLUTION INTRAVENOUS at 17:16

## 2017-01-01 RX ADMIN — LIDOCAINE HYDROCHLORIDE,EPINEPHRINE BITARTRATE 200 MG: 10; .01 INJECTION, SOLUTION INFILTRATION; PERINEURAL at 08:54

## 2017-01-01 RX ADMIN — IOPAMIDOL 100 ML: 612 INJECTION, SOLUTION INTRAVENOUS at 08:27

## 2017-01-01 RX ADMIN — PACLITAXEL 200 MG: 100 INJECTION, POWDER, LYOPHILIZED, FOR SUSPENSION INTRAVENOUS at 13:10

## 2017-01-01 RX ADMIN — HYDROMORPHONE HYDROCHLORIDE 1 MG: 1 INJECTION, SOLUTION INTRAMUSCULAR; INTRAVENOUS; SUBCUTANEOUS at 00:10

## 2017-01-01 RX ADMIN — OXYCODONE HYDROCHLORIDE 15 MG: 5 TABLET ORAL at 09:15

## 2017-01-01 RX ADMIN — Medication 10 ML: at 04:05

## 2017-01-01 RX ADMIN — PROPOFOL 50 MG: 10 INJECTION, EMULSION INTRAVENOUS at 11:21

## 2017-01-01 RX ADMIN — PROPOFOL 50 MG: 10 INJECTION, EMULSION INTRAVENOUS at 11:16

## 2017-01-01 RX ADMIN — PROPOFOL 100 MG: 10 INJECTION, EMULSION INTRAVENOUS at 11:01

## 2017-01-01 RX ADMIN — HYDROMORPHONE HYDROCHLORIDE 1 MG: 1 INJECTION, SOLUTION INTRAMUSCULAR; INTRAVENOUS; SUBCUTANEOUS at 08:51

## 2017-01-01 RX ADMIN — Medication 10 ML: at 21:23

## 2017-01-01 RX ADMIN — LIDOCAINE HYDROCHLORIDE 40 MG: 20 INJECTION, SOLUTION EPIDURAL; INFILTRATION; INTRACAUDAL; PERINEURAL at 11:01

## 2017-01-01 RX ADMIN — SODIUM CHLORIDE 10 MG: 9 INJECTION INTRAMUSCULAR; INTRAVENOUS; SUBCUTANEOUS at 14:58

## 2017-01-01 RX ADMIN — Medication 10 ML: at 13:17

## 2017-01-01 RX ADMIN — DIPHENHYDRAMINE HYDROCHLORIDE 25 MG: 50 INJECTION, SOLUTION INTRAMUSCULAR; INTRAVENOUS at 14:09

## 2017-01-01 RX ADMIN — MORPHINE SULFATE 15 MG: 15 TABLET, EXTENDED RELEASE ORAL at 14:56

## 2017-01-01 RX ADMIN — Medication 500 UNITS: at 15:29

## 2017-01-01 RX ADMIN — HYDROMORPHONE HYDROCHLORIDE 1 MG: 1 INJECTION, SOLUTION INTRAMUSCULAR; INTRAVENOUS; SUBCUTANEOUS at 09:34

## 2017-01-01 RX ADMIN — SODIUM CHLORIDE 1600 MG: 900 INJECTION, SOLUTION INTRAVENOUS at 14:42

## 2017-01-01 RX ADMIN — MIDAZOLAM HYDROCHLORIDE 2 MG: 1 INJECTION, SOLUTION INTRAMUSCULAR; INTRAVENOUS at 08:37

## 2017-01-01 RX ADMIN — SODIUM CHLORIDE 10 ML: 9 INJECTION INTRAMUSCULAR; INTRAVENOUS; SUBCUTANEOUS at 10:38

## 2017-01-01 RX ADMIN — ACETAMINOPHEN 650 MG: 325 TABLET, FILM COATED ORAL at 00:25

## 2017-01-01 RX ADMIN — Medication 5 ML: at 14:12

## 2017-01-01 RX ADMIN — SODIUM CHLORIDE 100 ML/HR: 900 INJECTION, SOLUTION INTRAVENOUS at 17:42

## 2017-01-01 RX ADMIN — SODIUM CHLORIDE: 9 INJECTION, SOLUTION INTRAVENOUS at 10:51

## 2017-01-01 RX ADMIN — CEFAZOLIN 2 G: 1 INJECTION, POWDER, FOR SOLUTION INTRAMUSCULAR; INTRAVENOUS; PARENTERAL at 08:37

## 2017-01-01 RX ADMIN — PROPOFOL 50 MG: 10 INJECTION, EMULSION INTRAVENOUS at 11:40

## 2017-01-01 RX ADMIN — HEPARIN 500 UNITS: 100 SYRINGE at 09:32

## 2017-01-01 RX ADMIN — SODIUM CHLORIDE 500 ML: 900 INJECTION, SOLUTION INTRAVENOUS at 08:59

## 2017-01-01 RX ADMIN — SODIUM CHLORIDE 25 ML/HR: 900 INJECTION, SOLUTION INTRAVENOUS at 11:20

## 2017-01-01 RX ADMIN — FENTANYL CITRATE 50 MCG: 50 INJECTION, SOLUTION INTRAMUSCULAR; INTRAVENOUS at 12:59

## 2017-01-01 RX ADMIN — Medication 10 ML: at 10:05

## 2017-01-01 RX ADMIN — SODIUM BICARBONATE 5 ML: 0.2 INJECTION, SOLUTION INTRAVENOUS at 08:54

## 2017-01-01 RX ADMIN — PROPOFOL 50 MG: 10 INJECTION, EMULSION INTRAVENOUS at 11:35

## 2017-01-01 RX ADMIN — MIDAZOLAM HYDROCHLORIDE 1 MG: 1 INJECTION, SOLUTION INTRAMUSCULAR; INTRAVENOUS at 13:33

## 2017-01-01 RX ADMIN — PACLITAXEL 200 MG: 100 INJECTION, POWDER, LYOPHILIZED, FOR SUSPENSION INTRAVENOUS at 13:40

## 2017-01-01 RX ADMIN — LIDOCAINE HYDROCHLORIDE 400 MG: 20 INJECTION, SOLUTION INFILTRATION; PERINEURAL at 08:54

## 2017-01-01 RX ADMIN — SODIUM CHLORIDE 100 ML: 900 INJECTION, SOLUTION INTRAVENOUS at 16:19

## 2017-01-01 RX ADMIN — SODIUM CHLORIDE 1000 ML: 900 INJECTION, SOLUTION INTRAVENOUS at 15:54

## 2017-01-01 RX ADMIN — SODIUM CHLORIDE 10 MG: 9 INJECTION INTRAMUSCULAR; INTRAVENOUS; SUBCUTANEOUS at 11:48

## 2017-01-01 RX ADMIN — Medication 2 G: at 08:37

## 2017-01-01 RX ADMIN — MIDAZOLAM HYDROCHLORIDE 0.5 MG: 1 INJECTION, SOLUTION INTRAMUSCULAR; INTRAVENOUS at 09:50

## 2017-01-01 RX ADMIN — HYDROMORPHONE HYDROCHLORIDE 1 MG: 1 INJECTION, SOLUTION INTRAMUSCULAR; INTRAVENOUS; SUBCUTANEOUS at 17:42

## 2017-01-01 RX ADMIN — FENTANYL CITRATE 25 MCG: 50 INJECTION, SOLUTION INTRAMUSCULAR; INTRAVENOUS at 10:04

## 2017-01-01 RX ADMIN — SODIUM CHLORIDE 100 ML/HR: 900 INJECTION, SOLUTION INTRAVENOUS at 08:57

## 2017-01-01 RX ADMIN — PROPOFOL 50 MG: 10 INJECTION, EMULSION INTRAVENOUS at 11:12

## 2017-01-01 RX ADMIN — PROCHLORPERAZINE EDISYLATE 10 MG: 5 INJECTION INTRAMUSCULAR; INTRAVENOUS at 11:52

## 2017-01-01 RX ADMIN — MORPHINE SULFATE 15 MG: 15 TABLET, EXTENDED RELEASE ORAL at 21:48

## 2017-01-01 RX ADMIN — LORAZEPAM 1 MG: 2 INJECTION INTRAMUSCULAR; INTRAVENOUS at 02:35

## 2017-01-01 RX ADMIN — Medication 10 ML: at 14:13

## 2017-01-01 RX ADMIN — OXYCODONE HYDROCHLORIDE 15 MG: 5 TABLET ORAL at 08:19

## 2017-01-01 RX ADMIN — DOCUSATE SODIUM AND SENNOSIDES 1 TABLET: 8.6; 5 TABLET, FILM COATED ORAL at 21:26

## 2017-01-01 RX ADMIN — CEFAZOLIN 2 G: 1 INJECTION, POWDER, FOR SOLUTION INTRAMUSCULAR; INTRAVENOUS; PARENTERAL at 09:00

## 2017-01-01 RX ADMIN — DOCUSATE SODIUM AND SENNOSIDES 1 TABLET: 8.6; 5 TABLET, FILM COATED ORAL at 21:48

## 2017-01-01 RX ADMIN — SODIUM CHLORIDE 100 ML: 900 INJECTION, SOLUTION INTRAVENOUS at 03:10

## 2017-01-01 RX ADMIN — PROPOFOL 50 MG: 10 INJECTION, EMULSION INTRAVENOUS at 11:26

## 2017-01-01 RX ADMIN — FENTANYL CITRATE 25 MCG: 50 INJECTION, SOLUTION INTRAMUSCULAR; INTRAVENOUS at 09:53

## 2017-01-01 RX ADMIN — SODIUM CHLORIDE 100 ML: 900 INJECTION, SOLUTION INTRAVENOUS at 08:28

## 2017-01-01 RX ADMIN — HYDROMORPHONE HYDROCHLORIDE 2 MG: 2 TABLET ORAL at 04:44

## 2017-01-01 RX ADMIN — MORPHINE SULFATE 15 MG: 15 TABLET, EXTENDED RELEASE ORAL at 12:58

## 2017-01-01 RX ADMIN — ENOXAPARIN SODIUM 40 MG: 40 INJECTION SUBCUTANEOUS at 13:17

## 2017-01-01 RX ADMIN — HYDROMORPHONE HYDROCHLORIDE 1 MG: 1 INJECTION, SOLUTION INTRAMUSCULAR; INTRAVENOUS; SUBCUTANEOUS at 04:13

## 2017-01-01 RX ADMIN — Medication 10 ML: at 10:38

## 2017-01-01 RX ADMIN — SODIUM CHLORIDE 40 MG: 9 INJECTION INTRAMUSCULAR; INTRAVENOUS; SUBCUTANEOUS at 09:05

## 2017-01-01 RX ADMIN — Medication 10 ML: at 22:21

## 2017-01-01 RX ADMIN — SODIUM CHLORIDE 40 MG: 9 INJECTION INTRAMUSCULAR; INTRAVENOUS; SUBCUTANEOUS at 09:41

## 2017-01-01 RX ADMIN — SODIUM CHLORIDE 10 MG: 9 INJECTION INTRAMUSCULAR; INTRAVENOUS; SUBCUTANEOUS at 12:17

## 2017-01-01 RX ADMIN — SODIUM CHLORIDE, PRESERVATIVE FREE 500 UNITS: 5 INJECTION INTRAVENOUS at 14:50

## 2017-01-01 RX ADMIN — Medication 10 ML: at 08:18

## 2017-01-01 RX ADMIN — Medication 10 ML: at 13:02

## 2017-01-01 RX ADMIN — Medication 10 ML: at 03:10

## 2017-01-01 RX ADMIN — Medication 500 UNITS: at 09:32

## 2017-01-01 RX ADMIN — HYDROMORPHONE HYDROCHLORIDE 1 MG: 1 INJECTION, SOLUTION INTRAMUSCULAR; INTRAVENOUS; SUBCUTANEOUS at 22:21

## 2017-01-01 RX ADMIN — FENTANYL CITRATE 50 MCG: 50 INJECTION, SOLUTION INTRAMUSCULAR; INTRAVENOUS at 09:43

## 2017-01-01 RX ADMIN — HYDROMORPHONE HYDROCHLORIDE 1 MG: 1 INJECTION, SOLUTION INTRAMUSCULAR; INTRAVENOUS; SUBCUTANEOUS at 14:58

## 2017-01-01 RX ADMIN — SODIUM CHLORIDE 40 MG: 9 INJECTION INTRAMUSCULAR; INTRAVENOUS; SUBCUTANEOUS at 10:45

## 2017-01-01 RX ADMIN — SODIUM CHLORIDE 100 ML/HR: 900 INJECTION, SOLUTION INTRAVENOUS at 02:46

## 2017-01-01 RX ADMIN — SODIUM CHLORIDE 25 ML/HR: 900 INJECTION, SOLUTION INTRAVENOUS at 08:17

## 2017-01-01 RX ADMIN — SODIUM CHLORIDE 10 ML: 9 INJECTION, SOLUTION INTRAMUSCULAR; INTRAVENOUS; SUBCUTANEOUS at 10:04

## 2017-01-01 RX ADMIN — PROPOFOL 50 MG: 10 INJECTION, EMULSION INTRAVENOUS at 11:49

## 2017-01-01 RX ADMIN — SODIUM CHLORIDE 1600 MG: 900 INJECTION, SOLUTION INTRAVENOUS at 14:48

## 2017-01-01 RX ADMIN — FENTANYL CITRATE 50 MCG: 50 INJECTION, SOLUTION INTRAMUSCULAR; INTRAVENOUS at 08:38

## 2017-01-01 RX ADMIN — Medication 10 ML: at 08:41

## 2017-01-01 RX ADMIN — HYDROMORPHONE HYDROCHLORIDE 1 MG: 1 INJECTION, SOLUTION INTRAMUSCULAR; INTRAVENOUS; SUBCUTANEOUS at 12:58

## 2017-01-01 RX ADMIN — SODIUM CHLORIDE 25 ML/HR: 900 INJECTION, SOLUTION INTRAVENOUS at 12:11

## 2017-01-01 RX ADMIN — SODIUM CHLORIDE 40 MG: 9 INJECTION INTRAMUSCULAR; INTRAVENOUS; SUBCUTANEOUS at 08:19

## 2017-01-01 RX ADMIN — HYDROMORPHONE HYDROCHLORIDE 1 MG: 1 INJECTION, SOLUTION INTRAMUSCULAR; INTRAVENOUS; SUBCUTANEOUS at 06:21

## 2017-01-01 RX ADMIN — MIDAZOLAM HYDROCHLORIDE 1 MG: 1 INJECTION, SOLUTION INTRAMUSCULAR; INTRAVENOUS at 12:34

## 2017-01-01 RX ADMIN — Medication 10 ML: at 09:57

## 2017-01-01 RX ADMIN — MORPHINE SULFATE 15 MG: 15 TABLET, EXTENDED RELEASE ORAL at 11:00

## 2017-01-01 RX ADMIN — DEXAMETHASONE SODIUM PHOSPHATE 8 MG: 4 INJECTION, SOLUTION INTRA-ARTICULAR; INTRALESIONAL; INTRAMUSCULAR; INTRAVENOUS; SOFT TISSUE at 12:01

## 2017-01-01 RX ADMIN — PROPOFOL 50 MG: 10 INJECTION, EMULSION INTRAVENOUS at 11:44

## 2017-01-01 RX ADMIN — IOPAMIDOL 100 ML: 755 INJECTION, SOLUTION INTRAVENOUS at 03:10

## 2017-01-01 RX ADMIN — ENOXAPARIN SODIUM 40 MG: 40 INJECTION SUBCUTANEOUS at 12:58

## 2017-01-01 RX ADMIN — SODIUM CHLORIDE 40 MG: 9 INJECTION INTRAMUSCULAR; INTRAVENOUS; SUBCUTANEOUS at 17:35

## 2017-01-01 RX ADMIN — MORPHINE SULFATE 15 MG: 15 TABLET, EXTENDED RELEASE ORAL at 21:26

## 2017-01-01 RX ADMIN — Medication 10 ML: at 07:19

## 2017-01-01 RX ADMIN — Medication 10 ML: at 12:58

## 2017-01-01 RX ADMIN — Medication 10 ML: at 06:21

## 2017-01-01 RX ADMIN — SODIUM CHLORIDE, PRESERVATIVE FREE 500 UNITS: 5 INJECTION INTRAVENOUS at 10:38

## 2017-01-01 RX ADMIN — ONDANSETRON 4 MG: 2 INJECTION INTRAMUSCULAR; INTRAVENOUS at 06:21

## 2017-01-01 RX ADMIN — HYDROMORPHONE HYDROCHLORIDE 1 MG: 1 INJECTION, SOLUTION INTRAMUSCULAR; INTRAVENOUS; SUBCUTANEOUS at 04:54

## 2017-01-01 RX ADMIN — DEXAMETHASONE SODIUM PHOSPHATE: 4 INJECTION, SOLUTION INTRA-ARTICULAR; INTRALESIONAL; INTRAMUSCULAR; INTRAVENOUS; SOFT TISSUE at 12:07

## 2017-01-01 RX ADMIN — SODIUM CHLORIDE 100 ML/HR: 900 INJECTION, SOLUTION INTRAVENOUS at 14:53

## 2017-01-01 RX ADMIN — ALCOHOL 15 ML: 0.98 INJECTION INTRASPINAL at 13:48

## 2017-01-01 RX ADMIN — FENTANYL CITRATE 50 MCG: 50 INJECTION, SOLUTION INTRAMUSCULAR; INTRAVENOUS at 08:55

## 2017-01-01 RX ADMIN — Medication 10 ML: at 08:03

## 2017-01-01 RX ADMIN — Medication 10 ML: at 14:50

## 2017-01-01 RX ADMIN — Medication 10 ML: at 08:27

## 2017-01-01 RX ADMIN — HYDROMORPHONE HYDROCHLORIDE 1 MG: 1 INJECTION, SOLUTION INTRAMUSCULAR; INTRAVENOUS; SUBCUTANEOUS at 17:37

## 2017-01-01 RX ADMIN — HYDROMORPHONE HYDROCHLORIDE 1 MG: 1 INJECTION, SOLUTION INTRAMUSCULAR; INTRAVENOUS; SUBCUTANEOUS at 03:16

## 2017-01-01 RX ADMIN — PROPOFOL 50 MG: 10 INJECTION, EMULSION INTRAVENOUS at 11:05

## 2017-01-01 RX ADMIN — PROPOFOL 50 MG: 10 INJECTION, EMULSION INTRAVENOUS at 11:31

## 2017-01-01 RX ADMIN — SODIUM CHLORIDE 40 MG: 9 INJECTION INTRAMUSCULAR; INTRAVENOUS; SUBCUTANEOUS at 08:51

## 2017-05-08 PROBLEM — R10.84 GENERALIZED ABDOMINAL PAIN: Status: ACTIVE | Noted: 2017-01-01

## 2017-05-08 PROBLEM — K86.89 PANCREATIC MASS: Status: ACTIVE | Noted: 2017-01-01

## 2017-05-08 NOTE — ROUTINE PROCESS
Report called to [facility]. Floor is ready to receive pt. Pt remains awake and alert, appropriate. Skin warm and dry with respirations even and unlabored. Pt transferred to floor via stretcher by this RN.

## 2017-05-08 NOTE — ED NOTES
Pt resting quietly in bed under blanket and on mon x2, has call bell, visitor at bedside. Respirations unlabored, A&O, not vomiting. Pt appears calm and cooperative. States she still has some abd pain and anxiety.

## 2017-05-08 NOTE — ED NOTES
Pt A&O, respriations unlabored, skin warm and dry, not vomiting. Provided with blanket. Has been ambulatory to bathroom to void.

## 2017-05-08 NOTE — PROGRESS NOTES
Hospitalist Progress Note  Office: [phone]      Date of Service:  2017  NAME:  Jose Manuel Borja  :  1955  MRN:  [id number]      Admission Summary:   61 yo woman with GERD, EMERY on CPAP, chronic generalized pain presented from home on 17 with abdominal pain, nausea, vomiting, and diarrhea. CT abd/pelvis in the ED revealed pancreatic mass. She was admitted to the medical floor with newly discovered pancreatic mass. Interval history / Subjective:   C/o persistent pain under the left breast/left upper abdomen and intermittent pains all over the body; c/o nausea but not since this morning; no vomiting since last night; no diarrhea since 2 days ago; last received Dilaudid about 8-9am today     Assessment & Plan:     Pancreatic mass (POA)  - seen on CT abd/pelvis and concerning for malignancy  - unintentional weight loss of about 20 pounds in the last year  - General Surgery consulted: recommended GI consult for EUS  - consult GI    LUQ abdominal pain, chronic, recurrent (POA)  - pain control  - check lipase  - antiemetic prn  - keep NPO on IVF for now until GI eval    Chronic pain syndrome   - pain control, would benefit from outpatient pain management referral    Diarrhea (PTA)  - resolved, last BM was   - remove empiric enteric precautions  - no indication for C diff testing    Unintentional weight loss  - check prealbumin  - concerning for underlying malignancy with pancreatic mass    EMERY on CPAP -  advised to bring in CPAP from home    Code status: Full  DVT prophylaxis: SCDs    Care Plan discussed with: Patient/Family and Nurse  Disposition: TBD.  Came from home     Hospital Problems  Date Reviewed: 2017          Codes Class Noted POA    Generalized abdominal pain ICD-10-CM: R10.84  ICD-9-CM: 789.07  2017 Unknown        * (Principal)Pancreatic mass ICD-10-CM: K86.9  ICD-9-CM: 577.9  2017 Unknown            Review of Systems:   Pertinent items are noted in HPI. Vital Signs:    Last 24hrs VS reviewed since prior progress note. Most recent are:  Visit Vitals    /69 (BP 1 Location: Right arm, BP Patient Position: At rest;Supine)    Pulse (!) 58    Temp 97.9 °F (36.6 °C)    Resp 16    Ht 5' 7\" (1.702 m)    Wt 86.9 kg (191 lb 9.6 oz)    SpO2 96%    BMI 30.01 kg/m2       Intake/Output Summary (Last 24 hours) at 05/08/17 1521  Last data filed at 05/08/17 1321   Gross per 24 hour   Intake                0 ml   Output              400 ml   Net             -400 ml      Physical Examination:     Constitutional:  awake, no acute distress, cooperative, pleasant    ENT:  oral mucosa moist, oropharynx benign  Neck supple   Resp:  CTA bilaterally, no wheezing/rhonchi/rales   CV:  regular rhythm, normal rate, no m/r/g appreciated, no edema, +pulses    GI:  +BS, soft, non distended, mild LUQ TTP     Musculoskeletal:  moves all extremities    Neurologic:  AAOx3, NFD     Skin:  warm, dry  Eyes:  PERRL    Data Review:    Review and/or order of clinical lab test  Review and/or order of tests in the radiology section of CPT  Review and/or order of tests in the medicine section of CPT    Labs:     Recent Labs      05/08/17   0015   WBC  8.4   HGB  14.1   HCT  41.6   PLT  168     Recent Labs      05/08/17   0015   NA  138   K  4.0   CL  102   CO2  29   BUN  12   CREA  0.71   GLU  128*   CA  9.3     Recent Labs      05/08/17   0015   SGOT  10*   ALT  30   AP  91   TBILI  1.3*   TP  7.3   ALB  4.3   GLOB  3.0     No results for input(s): INR, PTP, APTT in the last 72 hours. No lab exists for component: INREXT   No results for input(s): FE, TIBC, PSAT, FERR in the last 72 hours. No results found for: FOL, RBCF   No results for input(s): PH, PCO2, PO2 in the last 72 hours. No results for input(s): CPK, CKNDX, TROIQ in the last 72 hours.     No lab exists for component: CPKMB  Lab Results   Component Value Date/Time    Cholesterol, total 209 09/13/2012 12:11 PM    HDL Cholesterol 57 09/13/2012 12:11 PM    LDL, calculated 125 09/13/2012 12:11 PM    Triglyceride 134 09/13/2012 12:11 PM    CHOL/HDL Ratio 3.5 10/05/2010 09:35 AM     No results found for: GLUCPOC  Lab Results   Component Value Date/Time    Color DARK YELLOW 10/07/2010 02:35 PM    Appearance CLEAR 10/07/2010 02:35 PM    Specific gravity 1.006 10/07/2010 02:35 PM    pH (UA) 7.0 10/07/2010 02:35 PM    Protein NEGATIVE  10/07/2010 02:35 PM    Glucose NEGATIVE  10/07/2010 02:35 PM    Ketone NEGATIVE  10/07/2010 02:35 PM    Bilirubin NEGATIVE  10/07/2010 02:35 PM    Urobilinogen 0.2 10/07/2010 02:35 PM    Nitrites NEGATIVE  10/07/2010 02:35 PM    Leukocyte Esterase NEGATIVE  10/07/2010 02:35 PM    Epithelial cells 0-5 10/07/2010 02:35 PM    Bacteria NEGATIVE  10/07/2010 02:35 PM    WBC 0-4 10/07/2010 02:35 PM    RBC 0-3 10/07/2010 02:35 PM     Medications Reviewed:     Current Facility-Administered Medications   Medication Dose Route Frequency    sodium chloride (NS) flush 5-10 mL  5-10 mL IntraVENous Q8H    sodium chloride (NS) flush 5-10 mL  5-10 mL IntraVENous PRN    ondansetron (ZOFRAN) injection 4 mg  4 mg IntraVENous Q6H PRN    0.9% sodium chloride infusion  100 mL/hr IntraVENous CONTINUOUS    HYDROmorphone (PF) (DILAUDID) injection 1 mg  1 mg IntraVENous Q4H PRN    LORazepam (ATIVAN) injection 0.5-1 mg  0.5-1 mg IntraVENous Q6H PRN     ______________________________________________________________________  EXPECTED LENGTH OF STAY: 2d 16h  ACTUAL LENGTH OF STAY:          0                 Steven Henderson MD

## 2017-05-08 NOTE — PROGRESS NOTES
TRANSFER - IN REPORT:    Verbal report received from Marco A Fraser (name) on Boles Has  being received from ED (unit) for routine progression of care. Report consisted of patients Situation, Background, Assessment and   Recommendations(SBAR). Information from the following report(s) SBAR, Kardex, ED Summary and MAR was reviewed with the receiving nurse. Opportunity for questions and clarification was provided. Assessment completed upon patients arrival to unit and care assumed. Primary Nurse Nara Garcia and KHURRAM Rodriguez performed a dual skin assessment on this patient No impairment noted  Stan score is 23.    0623: Upon being asked about PTA Med list and Advanced Directives, pt stated that her  Mattie Iniguez) had the Med List and would know if she had an advanced directive. Pt stated that her  will be back later today and will bring her Med list with him.

## 2017-05-08 NOTE — PROGRESS NOTES
Primary Nurse Vickie Moralez RN and Azeb Johns RN performed a dual skin assessment on this patient No impairment noted  Stan score is 23.

## 2017-05-08 NOTE — IP AVS SNAPSHOT
2700 [facility] [address] [place] [address] 
[phone] Patient: Natasha Dan MRN: [id number] VWA:[birth year] You are allergic to the following No active allergies Recent Documentation Height Weight BMI OB Status Smoking Status 1.702 m 88.5 kg 30.54 kg/m2 Postmenopausal Former Smoker Emergency Contacts  (Rel.) Home Phone Work Phone Mobile Phone Nic Jiménez (Spouse) [phone] -- -- About your hospitalization You were admitted on: May 8, 2017 You last received care in the:  [facility] You were discharged on:  May 14, 2017 Why you were hospitalized Your primary diagnosis was:  Pancreatic Cancer (Hcc) Your diagnoses also included:  Generalized Abdominal Pain, Pancreatic Mass Providers Seen During Your Hospitalizations Provider Role Specialty Primary office phone Amelia Ospina MD Attending Provider Emergency Medicine [phone] Brynn Mott MD Attending Provider [facility] [phone] Yasmani Pardo MD Attending Provider Internal Medicine [phone] Leticia Bess MD Attending Provider Internal Medicine [phone] Your Primary Care Physician (PCP) Primary Care Physician Office Phone Office Fax Ayden Guaman [phone] Follow-up Information Follow up With Details Comments Contact Info Glory Issa MD  Early next week. Our office will call to coordinate. 200 [facility] [address] [place] [address] 
[phone] Jacqueline Mendez, 2418 [facility] 2800 [facility] 101 [facility] [address] 
[phone] Sarah Lopez MD  Call office for appointment 217 [facility] [address] [address] [address] [place] [address] 
[phone] Current Discharge Medication List  
  
START taking these medications Dose & Instructions Dispensing Information Comments Morning Noon Evening Bedtime  
 morphine CR 15 mg CR tablet Commonly known as:  MS CONTIN Your last dose was: Your next dose is:    
   
   
 Dose:  15 mg Take 1 Tab by mouth every twelve (12) hours. Max Daily Amount: 30 mg.  
 Quantity:  20 Tab Refills:  0  
     
   
   
   
  
 oxyCODONE IR 15 mg immediate release tablet Commonly known as:  OXY-IR Your last dose was: Your next dose is:    
   
   
 Dose:  15 mg Take 1 Tab by mouth every six (6) hours as needed. Max Daily Amount: 60 mg.  
 Quantity:  20 Tab Refills:  0  
     
   
   
   
  
 senna-docusate 8.6-50 mg per tablet Commonly known as:  Armand Sia Your last dose was: Your next dose is:    
   
   
 Dose:  1 Tab Take 1 Tab by mouth nightly for 30 days. Quantity:  30 Tab Refills:  0 CONTINUE these medications which have NOT CHANGED Dose & Instructions Dispensing Information Comments Morning Noon Evening Bedtime APPLE CIDER VINEGAR PO Your last dose was: Your next dose is:    
   
   
 Dose:  450 mg Take 450 mg by mouth daily. Refills:  0  
     
   
   
   
  
 aspirin delayed-release 81 mg tablet Your last dose was: Your next dose is:    
   
   
 Dose:  81 mg Take 81 mg by mouth daily. Refills:  0 Moriah Alberto 256-29-95 mg-mg-million Tab Your last dose was: Your next dose is:    
   
   
 Dose:  1 Tab Take 1 Tab by mouth. Refills:  0 BUTTERBUR ROOT EXTRACT PO Your last dose was: Your next dose is: Take  by mouth daily. Refills:  0  
     
   
   
   
  
 CALCIUM+D 500 mg(1,250mg) -200 unit per tablet Generic drug:  calcium-vitamin D Your last dose was: Your next dose is:    
   
   
 Dose:  1 Tab Take 1 Tab by mouth daily. Refills:  0 CLARITIN 10 mg tablet Generic drug:  loratadine Your last dose was: Your next dose is:    
   
   
 Dose:  10 mg Take 10 mg by mouth daily. Refills:  0  
     
   
   
   
  
 CO Q-10 100 mg capsule Generic drug:  co-enzyme Q-10 Your last dose was: Your next dose is:    
   
   
 Dose:  300 mg Take 300 mg by mouth daily. Refills:  0  
     
   
   
   
  
 COSAMIN DS PO Your last dose was: Your next dose is:    
   
   
 Dose:  1500 mg Take 1,500 mg by mouth daily. Refills:  0 Iron 325 mg (65 mg iron) tablet Generic drug:  ferrous sulfate Your last dose was: Your next dose is: Take  by mouth daily (before breakfast). Refills:  0 MAGNESIUM PO Your last dose was: Your next dose is:    
   
   
 Dose:  400 mg Take 400 mg by mouth daily. Refills:  0 MILK THISTLE (BULK) Your last dose was: Your next dose is: Take  by mouth. Refills:  0  
     
   
   
   
  
 ondansetron 4 mg disintegrating tablet Commonly known as:  ZOFRAN ODT Your last dose was: Your next dose is:    
   
   
 Dose:  4 mg Take 4 mg by mouth every eight (8) hours as needed for Nausea. Refills:  0  
     
   
   
   
  
 therapeutic multivitamin-minerals tablet Commonly known as:  THERAGRAN-M Your last dose was: Your next dose is:    
   
   
 Dose:  1 Tab Take 1 Tab by mouth daily. Refills:  0  
     
   
   
   
  
 VITAMIN B-12 1,000 mcg tablet Generic drug:  cyanocobalamin ER Your last dose was: Your next dose is: Take  by mouth daily. Refills:  0  
     
   
   
   
  
 VITAMIN C 500 mg tablet Generic drug:  ascorbic acid (vitamin C) Your last dose was: Your next dose is:    
   
   
 Dose:  1000 mg Take 1,000 mg by mouth daily. Refills:  0 VITAMIN D3 PO Your last dose was: Your next dose is:    
   
   
 Dose:  5000 Int'l Units Take 5,000 Int'l Units by mouth daily. Refills:  0  
     
   
   
   
  
 vitamin E 400 unit capsule Commonly known as:  Avenashleigh Samuelkatey Gutiérrez [address] Your last dose was: Your next dose is: Take  by mouth daily. Refills:  0  
     
   
   
   
  
 VITAMIN K2 PO Your last dose was: Your next dose is:    
   
   
 Dose:  45 mcg Take 45 mcg by mouth. Refills:  0 Where to Get Your Medications Information on where to get these meds will be given to you by the nurse or doctor. ! Ask your nurse or doctor about these medications  
  morphine CR 15 mg CR tablet  
 oxyCODONE IR 15 mg immediate release tablet  
 senna-docusate 8.6-50 mg per tablet Discharge Instructions Discharge Instructions PATIENT ID: Albert Meadows MRN: [id number] YOB: 1955 DATE OF ADMISSION: 5/8/2017 12:03 AM   
DATE OF DISCHARGE: 5/14/2017 PRIMARY CARE PROVIDER: Matt Ugalde DO  
 
ATTENDING PHYSICIAN: Samreen Higginbotham MD 
DISCHARGING PROVIDER: Samreen Higginbotham MD   
To contact this individual call [phone] and ask the  to page. If unavailable ask to be transferred the Adult Hospitalist Department. DISCHARGE DIAGNOSES Pancreatic malignancy Abdominal pain due to above Pancreatitis Nausea and vomiting CONSULTATIONS: IP CONSULT TO HOSPITALIST 
IP CONSULT TO GENERAL SURGERY 
IP CONSULT TO GASTROENTEROLOGY 
IP CONSULT TO ONCOLOGY 
IP CONSULT TO PALLIATIVE CARE - PROVIDER PROCEDURES/SURGERIES: Procedure(s) with comments: 
ENDOSCOPIC ULTRASOUND (EUS) - eus FINE NEEDLE ASPIRATION 
ESOPHAGOGASTRODUODENOSCOPY (EGD) PENDING TEST RESULTS:  
At the time of discharge the following test results are still pending: linda FOLLOW UP APPOINTMENTS:  
Follow-up Information Follow up With Details Comments Contact Info Gloria Celestin MD  Early next week. Our office will call to coordinate. 200 [facility] [address] 
[phone] Ray Albrecht, 2418 Danette [address]ing 2800 [facility] [address] [place] [address] [address] 
[phone] Juliocesar Wong MD  Call office for appointment 217 [facility] [address] 
[phone] ADDITIONAL CARE RECOMMENDATIONS: 
You would discuss the definitive care plan when you meet Dr Ryan Infante in the office next week. Do not drive  when you are taking opioid pain medications Follow with the palliative care team,  [facility] team for pain management. They would like to call for the on call person if you need help any time. This is their contact [phone]) DIET: Regular Diet ACTIVITY: Activity as tolerated WOUND CARE: NA 
 
EQUIPMENT needed: NA 
 
 
  
 SNF/Inpatient Rehab/LTAC Independent/assisted living Hospice Other:  
 
 
 
Signed: Gage Mccord MD 
5/14/2017 
3:00 PM 
 
Discharge Instructions Attachments/References MEFS - FLUCONAZOLE (DIFLUCAN) - (BY MOUTH) (ENGLISH) MEFS - MORPHINE - (INTO THE RECTUM) (ENGLISH) Discharge Orders None [facility] Announcement We are excited to announce that we are making your provider's discharge notes available to you in [employer]. You will see these notes when they are completed and signed by the physician that discharged you from your recent hospital stay. If you have any questions or concerns about any information you see in [facility], please call the Health Information Department where you were seen or reach out to your Primary Care Provider for more information about your plan of care. Introducing [facility]! Dear Denny Bates: Thank you for requesting a [facility]ita account. Our records indicate that you already have an active Navita account. You can access your account anytime at [email or web address] Did you know that you can access your hospital and ER discharge instructions at any time in Navita? You can also review all of your test results from your hospital stay or ER visit. Additional Information If you have questions, please visit the Frequently Asked Questions section of the Navita website at [email or web address] Remember, Navita is NOT to be used for urgent needs. For medical emergencies, dial 911. Now available from your iPhone and Android! General Information Please provide this summary of care documentation to your next provider. Patient Signature:  ____________________________________________________________ Date:  ____________________________________________________________  
  
Suma Nunez Provider Signature:  ____________________________________________________________ Date:  ____________________________________________________________

## 2017-05-08 NOTE — ED PROVIDER NOTES
HPI Comments: 60 y.o. female with past medical history significant for sleep apnea (uses CPAP), GERD, and allergic rhinitis who presents from home with chief complaint of abdominal pain. Pt complains of constant generalized abdominal pain since Thursday (~4 days ago). Pt also complains of diarrhea, chills, insomnia, constant lower back pain, and cold intolerance since December 2016. Pt's  states that pt has had lower energy levels for the past year with decreased appetite for the past month. Pt also notes that she has had vertigo twice this year. Pt took Zofran this afternoon, and vomited immediately afterwards. Pt states that she has been taking Tylenol and Advil without any relief. Pt states that she had an appointment with her urologist last week because of frequency and was told that she has muscle spasms. Pt also states that she has an appointment scheduled with her PCP on Tuesday because she needs a referral to see a pain management specialist. Pt reports that she had a stomach ulcer 2-3 years ago that resolved with diet change. There are no other acute medical concerns at this time. PCP: Loni Pelayo DO    Note written by Tricia Rodriguez, as dictated by Aleks Holguin MD 12:40 AM        The history is provided by the patient and the spouse. No  was used.         Past Medical History:   Diagnosis Date    Allergic cough 10/5/2010    AR (allergic rhinitis)     cough    GERD (gastroesophageal reflux disease)     Tx FOR \"REALLY BAD HEARTBURN\" IN 2013    Nausea & vomiting     PMS (premenstrual syndrome) 12/26/14    IN PAST, WAS SEVERE; MENOPAUSE 10 YRS AGO, PT SAYS    Postmenopause, LMP 51yo, NO HRT 10/5/2010    S/P normal colonoscopy 2004 10/5/2010    Unspecified sleep apnea 12/26/14    USES CPAP       Past Surgical History:   Procedure Laterality Date    DEXA BONE DENSITY STUDY AXIAL  2/2004    Normal; Dr Darian Anderson (prev Gyn)   [address] [facility]  30yo    benign AUB; negative    ENDOSCOPY, COLON, DIAGNOSTIC  12/26/14    LAST COLONOSCOPY JAN 2014; PRIOR SCOPE 2004    HX RHINOPLASTY  1986    w/ chin implant (mentoplasty)    HX WISDOM TEETH EXTRACTION      [place] OUTER EAR SURGERY PROC UNLISTED  1981    excision benign tumor behind right ear         Family History:   Problem Relation Age of Onset    Stroke Mother     Heart Disease Mother     Heart Disease Father     Arthritis-rheumatoid Sister     Cancer Brother      skin cancer    HIV/AIDS Brother     Stroke Brother 79     TIA's    Heart Disease Brother 79     CABG    Stroke Maternal Grandmother     Heart Attack Maternal Grandfather     Heart Attack Paternal Grandmother     Heart Attack Paternal Grandfather     Colon Cancer Sister     Anesth Problems Neg Hx        Social History     Social History    Marital status:      Spouse name: N/A    Number of children: 0    Years of education: N/A     Occupational History    Homemaker; 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 [address] Alcohol use No      Comment: very very rare    Drug use: No    Sexual activity: Not Currently     Other Topics Concern    Occupational Exposure No     down to 1 cup of cooffee qam; was up to 3-4 cups up unitl ~ the past few weeks    Weight Concern Yes     happy she has lost 20 lbs since strict diet and exercise    Special Diet Yes     since beginning of August went on strict diet of carb cuonting, low fat, protein and calorie counts; well balanced    Exercise Yes     swims a lot and since August started walking about 2-5 x a week x 45 minutes     Social History Narrative         ALLERGIES: Review of patient's allergies indicates no known allergies.     Review of Systems Constitutional: Positive for activity change, appetite change and chills. Negative for diaphoresis, fatigue and fever. HENT: Negative for congestion and sore throat. Eyes: Negative for photophobia and visual disturbance. Respiratory: Negative for chest tightness and shortness of breath. Cardiovascular: Negative for chest pain, palpitations and leg swelling. Gastrointestinal: Positive for abdominal pain, diarrhea, nausea and vomiting. Negative for blood in stool and constipation. Genitourinary: Positive for frequency. Negative for difficulty urinating, dysuria, flank pain and hematuria. Musculoskeletal: Positive for back pain. Neurological: Negative for syncope, numbness and headaches. All other systems reviewed and are negative. Vitals:    05/08/17 0001 05/08/17 0030   BP: 157/80 148/78   Pulse: 73    Resp: 18 16   Temp: 97.9 °F (36.6 °C)    SpO2: 97% 99%   Weight: 76.8 kg (169 lb 6.4 oz)    Height: 5' 7\" (1.702 m)             Physical Exam   Constitutional: She is oriented to person, place, and time. She appears well-developed and well-nourished. tearful, anxious appearing   HENT:   Head: Normocephalic and atraumatic. Nose: Nose normal.   Mouth/Throat: Oropharynx is clear and moist. No oropharyngeal exudate. Eyes: Conjunctivae and EOM are normal. Right eye exhibits no discharge. Left eye exhibits no discharge. No scleral icterus. Neck: Normal range of motion. Neck supple. No JVD present. No tracheal deviation present. No thyromegaly present. Cardiovascular: Normal rate, regular rhythm, normal heart sounds and intact distal pulses. Exam reveals no gallop and no friction rub. No murmur heard. Pulmonary/Chest: Effort normal and breath sounds normal. No stridor. No respiratory distress. She has no wheezes. She has no rales. She exhibits no tenderness. Abdominal: Bowel sounds are normal. She exhibits no distension and no mass.  There is tenderness (diffuse abdominal tenderness throughout). There is no rebound. Musculoskeletal: Normal range of motion. She exhibits no edema or tenderness. Lymphadenopathy:     She has no cervical adenopathy. Neurological: She is alert and oriented to person, place, and time. No cranial nerve deficit. Coordination normal.   Skin: Skin is warm and dry. No rash noted. She is not diaphoretic. No erythema. No pallor. Psychiatric: She has a normal mood and affect. Her behavior is normal. Judgment and thought content normal.   Nursing note and vitals reviewed. Note written by Tricia Blum, as dictated by Sofia Penaloza MD 12:40AM        MDM  Number of Diagnoses or Management Options  Abdominal pain, right upper quadrant:   Pancreatic mass:   Diagnosis management comments: Hypothyroidism, parathyroid dysfunction, abdominal pain. 59-year-old female presenting with fatigue times one and a half years weight loss abdominal pain. Concern for hyperthyroidism versus parathyroid dysfunction 5 differential we'll obtain CBC CMP lactate lipase will consider CT imaging based on above. Amount and/or Complexity of Data Reviewed  Clinical lab tests: ordered and reviewed  Tests in the radiology section of CPT®: ordered and reviewed  Review and summarize past medical records: yes  Discuss the patient with other providers: yes    Risk of Complications, Morbidity, and/or Mortality  Presenting problems: moderate  Diagnostic procedures: moderate  Management options: moderate    Patient Progress  Patient progress: stable    ED Course       Procedures    CONSULT NOTE:  4:35 AM Sofia Penaloza MD spoke with Dr. Roxanne Roach, Consult for Hospitalist.  Discussed available diagnostic tests and clinical findings. He is in agreement with care plans as outlined. Dr. Roxanne Roach recommends admitting the patient.     4:35 AM  Patient is being admitted to the hospital.  The results of their tests and reasons for their admission have been discussed with them and/or available family. They convey agreement and understanding for the need to be admitted and for their admission diagnosis. Consultation will be made now with the inpatient physician for hospitalization.

## 2017-05-08 NOTE — CONSULTS
[address] [address]        GASTROENTEROLOGY CONSULTATION NOTE  KONG Unger  [phone] office  [phone] NP in-hospital cell phone M-F until 4:30  After 5pm or on weekends, please call  for physician on call        NAME:  Milka Mccall   :   1955   MRN:   [id number]       Referring Physician: Levy Suazo    Consult Date: 2017 4:34 PM    Chief Complaint: abdominal pain     History of Present Illness:  Patient is a 60 y.o. who is seen in consultation at the request of Dr. Aileen Montesinos for pancreatic mass. Pt PMH as below. Pt presents to ER for abdominal pain. Pt has been having abdominal pain for about a year without finding a concrete etiology. Pt does not believe she has had any previous imaging done. Pt also is having nausea/vomiting, diarrhea (last 2 months) and weight loss. She has been taking chronic NSAIDs - history of ulcer - diagnosed without EGD in past.  says her fatigue and lethargy are increasing. Pt denies black/bloody stool, hematemesis, fever/chills, recent antibiotics. Pt has family history of colon cancer (sister,  at 73 yo). She has had 2 colonoscopies but previous provider has retired. Last was in  and 2 small polyps found per patient. Recommended to repeat in 5 years. No previous EGD. I have reviewed the emergency room note, hospital admission note, notes by all other clinicians who have seen the patient during this hospitalization to date. I have reviewed the problem list and the reason for this hospitalization. I have reviewed the allergies and the medications the patient was taking at home prior to this hospitalization.     PMH:  Past Medical History:   Diagnosis Date    Allergic cough 10/5/2010    AR (allergic rhinitis)     cough    GERD (gastroesophageal reflux disease)     Tx FOR \"REALLY BAD HEARTBURN\" IN     Nausea & vomiting     PMS (premenstrual syndrome) 13    IN PAST, WAS SEVERE; MENOPAUSE 10 YRS AGO, PT SAYS    Postmenopause, LMP 49yo, NO HRT 10/5/2010    S/P normal colonoscopy 2004 10/5/2010    Unspecified sleep apnea 12/26/14    USES CPAP       PSH:  Past Surgical History:   Procedure Laterality Date    DEXA BONE DENSITY STUDY AXIAL  2/2004    Normal; Dr Romaine Landeros (prev Gyn)   [address]  73OE    benign AUB; negative    ENDOSCOPY, COLON, DIAGNOSTIC  12/26/14    LAST COLONOSCOPY JAN 2014; PRIOR SCOPE 2004    HX RHINOPLASTY  1986    w/ chin implant (mentoplasty)    HX WISDOM TEETH EXTRACTION      [place] OUTER EAR SURGERY PROC UNLISTED  1981    excision benign tumor behind right ear       Allergies:  No Known Allergies    Home Medications:  Prior to Admission Medications   Prescriptions Last Dose Informant Patient Reported? Taking? APPLE CIDER VINEGAR PO   Yes Yes   Sig: Take 450 mg by mouth daily. Marco De León [id number] mg-mg-million tab 5/7/2017 at Unknown time  Yes Yes   Sig: Take 1 Tab by mouth. BUTTERBUR ROOT EXTRACT PO   Yes Yes   Sig: Take  by mouth daily. CHOLECALCIFEROL, VITAMIN D3, (VITAMIN D3 PO) 5/7/2017 at Unknown time  Yes Yes   Sig: Take 5,000 Int'l Units by mouth daily. GLUC HCL/CSANA/GLY-AM-GLY,MX/C (COSAMIN DS PO) 5/7/2017 at Unknown time  Yes Yes   Sig: Take 1,500 mg by mouth daily. MAGNESIUM PO 5/7/2017 at Unknown time  Yes Yes   Sig: Take 400 mg by mouth daily. MILK THISTLE, BULK,   Yes Yes   Sig: Take  by mouth. VITAMIN K2 PO   Yes Yes   Sig: Take 45 mcg by mouth. ascorbic acid (VITAMIN C) 500 mg tablet 5/7/2017 at Unknown time  Yes Yes   Sig: Take 1,000 mg by mouth daily. aspirin delayed-release 81 mg tablet Not Taking at Unknown time  Yes No   Sig: Take 81 mg by mouth daily. calcium-vitamin D (CALCIUM+D) 500 mg(1,250mg) -200 unit per tablet Not Taking at Unknown time  Yes No   Sig: Take 1 Tab by mouth daily. co-enzyme Q-10 (CO Q-10) 100 mg capsule 5/7/2017 at Unknown time  Yes Yes   Sig: Take 300 mg by mouth daily. cyanocobalamin (VITAMIN B-12) 1,000 mcg TbSR 5/7/2017 at Unknown time  Yes Yes   Sig: Take  by mouth daily. ferrous sulfate (IRON) 325 mg (65 mg Iron) tablet 5/7/2017 at Unknown time  Yes Yes   Sig: Take  by mouth daily (before breakfast). loratadine (CLARITIN) 10 mg tablet Not Taking at Unknown time  Yes No   Sig: Take 10 mg by mouth daily. ondansetron (ZOFRAN ODT) 4 mg disintegrating tablet 5/7/2017 at 0445  Yes Yes   Sig: Take 4 mg by mouth every eight (8) hours as needed for Nausea. therapeutic multivitamin-minerals (THERAGRAN-M) tablet 5/7/2017 at Unknown time  Yes Yes   Sig: Take 1 Tab by mouth daily. vitamin E (AQUA GEMS) 400 unit capsule Not Taking at Unknown time  Yes No   Sig: Take  by mouth daily.       Facility-Administered Medications: None       Hospital Medications:  Current Facility-Administered Medications   Medication Dose Route Frequency    sodium chloride (NS) flush 5-10 mL  5-10 mL IntraVENous Q8H    sodium chloride (NS) flush 5-10 mL  5-10 mL IntraVENous PRN    ondansetron (ZOFRAN) injection 4 mg  4 mg IntraVENous Q6H PRN    0.9% sodium chloride infusion  100 mL/hr IntraVENous CONTINUOUS    HYDROmorphone (PF) (DILAUDID) injection 1 mg  1 mg IntraVENous Q4H PRN    LORazepam (ATIVAN) injection 0.5-1 mg  0.5-1 mg IntraVENous Q6H PRN       Social History:  Social History   Substance Use Topics    Smoking status: Former Smoker     Years: 6.00     Quit date: 9/27/1980    Smokeless tobacco: Never Used      Comment: used to smoke about 2 packs a week from college 1974 through 1980    Alcohol use No      Comment: very very rare       Family History:  Family History   Problem Relation Age of Onset    Stroke Mother     Heart Disease Mother     Heart Disease Father     Arthritis-rheumatoid Sister     Cancer Brother      skin cancer    HIV/AIDS Brother     Stroke Brother 79     TIA's    Heart Disease Brother 79     CABG    Stroke Maternal Grandmother     Heart Attack Maternal Grandfather     Heart Attack Paternal Grandmother     Heart Attack Paternal Grandfather     Colon Cancer Sister     Anesth Problems Neg Hx        Review of Systems:  Constitutional: negative fever, negative chills, + weight loss  Eyes:   negative visual changes  ENT:   negative sore throat, tongue or lip swelling  Respiratory:  negative cough, + dyspnea at night usually  Cards:  negative for chest pain, palpitations, lower extremity edema  GI:   See HPI  :  negative for frequency, dysuria  Integument:  negative for rash and pruritus  Heme:  negative for easy bruising and gum/nose bleeding  Musculoskel: negative for myalgias, + back pain, -muscle weakness  Neuro: negative for headaches, dizziness; + vertigo  Psych:  negative for feelings of anxiety, depression     Objective:   Patient Vitals for the past 8 hrs:   BP Temp Pulse Resp SpO2   05/08/17 1521 164/85 98.6 °F (37 °C) 60 18 100 %   05/08/17 0943 130/69 97.9 °F (36.6 °C) (!) 58 16 96 %     05/08 0701 - 05/08 1900  In: -   Out: 400 [Urine:400]       EXAM:     CONST:  Obese female in bed in no acute distress;  at bedside   NEURO:  alert and oriented x 3   HEENT: EOMI, no scleral icterus   LUNGS: clear to ausculation, (-) wheeze   CARD:  regular rate and rhythm, S1 S2   ABD:  soft, tenderness LUQ, epigastrium, periumbilical, no rebound, bowel sounds (+) all 4 quadrants, no masses, non distended   EXT:  no edema, warm   PSYCH: full, not anxious     Data Review     Recent Labs      05/08/17   0015   WBC  8.4   HGB  14.1   HCT  41.6   PLT  168     Recent Labs      05/08/17   0015   NA  138   K  4.0   CL  102   CO2  29   BUN  12   CREA  0.71   GLU  128*   CA  9.3     Recent Labs      05/08/17   0015   SGOT  10*   AP  91   TP  7.3   ALB  4.3   GLOB  3.0   LPSE  >3000*     No results for input(s): INR, PTP, APTT in the last 72 hours.     No lab exists for component: INREXT       Assessment:   · Pancreatic body mass: found on CT scan since admission from ER for abdominal pain. Pt has been having vomiting, diarrhea, weight loss, lethargy and pain for months. Pt and  very concerned about pain management. Patient Active Problem List   Diagnosis Code    Postmenopause, LMP 37CN, NO HRT Z78.0    S/P normal colonoscopy 2004 Z98.890    Allergic rhinitis J30.9    Allergic cough R05    Obstructive sleep apnea (adult) (pediatric) G47.33    Generalized abdominal pain R10.84    Pancreatic mass K86.9     Plan:   · EUS tomorrow  · Abdominal pain could be multi-factorial.  · Add PPI in setting of chronic NSAID use and ulcer history  · Agree with ordered stool studies, add pancreatic elastase for pancreatic insufficiency - may be cause of diarrhea. · Thank you for allowing me to participate in care of Obi Wong     Signed By: Nivia Lopez. Tammy Andrade NP     5/8/2017  4:34 PM       I have examined the patient. I have reviewed the chart and agree with the documentation recorded by the NP, including the assessment, treatment plan, and disposition.     EUS in am, pt is agreeable    Sathish Fong MD

## 2017-05-08 NOTE — H&P
[address]   174 [facility], [address] [place] [address]   HISTORY AND PHYSICAL       Name:  Cleve Lowry   MR#:  [id number]   :  1955   Account #:  [de-identified]        Date of Adm:  2017       CHIEF COMPLAINT: Abdominal pain. HISTORY OF PRESENT ILLNESS: A 76-year-old white female with   past medical history of GERD, sleep apnea, chronic generalized   pain, presented to the emergency department from home. Chief   complaint of abdominal pain. The patient is accompanied by her    who is who provided some additional history. The   remainder of her history was obtained from review of ED and medical   reports. Per the collected reports, the patient has been   having generalized body aches, pains since 2016. She reports   being followed by her PCP. Has been undergoing physical therapy. She notes she has been engaged in vigorous rehab exercise program.   She thought she was having some residual muscle aches and pains   from the same. She has been having ongoing chronic abdominal pain,   generalized back pain, generalized body aches, cold intolerance, and   most recently decreased appetite. She notes she suffers from chronic   vertigo (benign positional) for which she has had testing for the same   according to her 's report. Her  goes on to   say she has had chronic fatigue over the past year. The patient notes   that her symptoms had worsened to the point where she was seeking   referrals for a pain specialist for her PCP. She also reportedly has   been taking Tylenol and Advil without relief of symptoms. She most   recently had an appointment with her urologist last week secondary to   urinary frequency and at the time she notes she was still having   muscle spasms. She complains of having significant weight loss   approximately \"a half a pound per day. \" She notes that her weight was   210 pounds back in 2016 with the onset of   symptoms.  She complains now of having generalized abdominal pain   that worsened compared to baseline that is rated as 10/10, diffuse, but   mostly located in the epigastrium, fluctuates in characterization from   dull to sharp \"like somebody punched me in the stomach. \" Pain is   nonradiating, without specific alleviating factors, aggravated with any   glutened. She notably has had nausea and vomiting, approximately 2   episodes of nonbloody, nonbilious emesis, diarrhea been chronic,   loose watery non-bloody stools. She notes last bowel movement was   yesterday. She is not complaining of any syncope, loss of   consciousness, new onset numbness, paresthesias, slurred   speech, facial droop, headache, neck pain. She complains   of generalized chest pain which is also chronic. She is not complaining   of definite shortness of breath, cough, congestion, fever, chills,   melena, dysuria, hematuria. On arrival in the emergency department,   initial recorded vital signs were blood pressure 157/80, heart rate of 73,   respiratory rate 18, O2 saturation 97% on room air. Workup that   included CT abdomen and pelvis with IV contrast showed no acute   process; however, showed a 2.5 cm pancreatic body lesion which may   represent malignant neoplasm and involve the splenic artery and celiac   artery. Per the ED, the patient was given Toradol 30 mg IV, Ativan 1   mg IV, Dilaudid 1 mg IV. She is now seen for admission to our   hospitalist service for continued evaluation and treatment. PAST MEDICAL HISTORY:   1. Allergic rhinitis. 2. GERD. 3. Postmenopause. 4. Sleep apnea, uses a CPAP machine. 5. Obesity. 6. Chronic pain syndrome. PAST SURGICAL HISTORY:   1. Dilation and curettage. 2. Endoscopy. 3. Colonoscopy. 4. Rhinoplasty. 5. [place] tooth extraction. 6. Right ear surgery with excision of benign tumor in 1981. MEDICATIONS:   1. Vitamin C 500 mg p.o. daily. 2. Calcium with vitamin D 1 tablet p.o. daily.    3. Vitamin D3 1 tablet p.o. daily (dose unknown). 4. Vitamin B12 1000 mcg p.o. daily. 5. Ferrous sulfate 325 mg p.o. daily. 6. Glucosamine 1 capsule po daily   7. Loratadine 10 mg p.o. daily. 8. Magnesium dose unknown, 1 tablet p.o. daily. 19. Vitamin E 400 units p.o. daily. ALLERGIES: No known drug allergies. SOCIAL HISTORY: Former smoker, reportedly quit in [facility] 27. Denies   alcohol and illicit drugs.  attack. FAMILY HISTORY: Stroke - mother, brother, maternal grandmother. Heart attack - maternal grandfather, maternal grandmother, paternal   grandfather. Heart disease - mother, father. TIAs - brother, skin cancer   - brother, rheumatoid arthritis - sister, coronary artery bypass graft -   brother, colon cancer - sister. REVIEW OF SYSTEMS: Eleven systems reviewed, pertinent positives   as HPI, otherwise negative. PHYSICAL EXAMINATION   VITAL SIGNS: Temperature is 98.1 degrees Fahrenheit, blood   pressure 140/69, heart rate 69, respiration 14, O2 saturation 100% on   room air. Recorded weight 169 pounds (76.8 kg), reported height   of feet 7 inches tall. GENERAL: Overweight female in no acute respiratory distress. PSYCHIATRIC: The patient is awake, alert, oriented x3. NEUROLOGIC: GCS 15. Moves extremities x4. Sensation is grossly   intact without slurred speech or facial droop. HEENT: Normocephalic, atraumatic. PERRLA. EOMs intact. Sclerae   anicteric. Conjunctivae clear. Nares are patent. Oropharynx is clear. Tongue is midline, not edematous. NECK: Supple, without lymphadenopathy, JVD, carotid bruits,   thyromegaly. LYMPH: Negative for cervical or supraclavicular adenopathy. RESPIRATORY: Lungs clear to auscultation bilaterally. CARDIOVASCULAR: Heart regular rate and rhythm, normal S1, S2,   without murmurs, rubs or gallops. GI: Abdomen soft. Generalized tenderness to palpation in both the   epigastric and suprapubic region without rebound, guarding or rigidity.    No auscultated abdominal bruits. No pulsatile mass. BACK: No CVA tenderness. No step-off deformity. MUSCULOSKELETAL: No acute palpable bony deformity. Negative for   calf tenderness. VASCULAR: 2+ radial, 1+ dorsalis pedis pulses, without cyanosis or   edema. SKIN: Warm and dry. LABORATORY DATA: I reviewed sodium 138, potassium 4.0, chloride   102, CO2 of 29, BUN 12, creatinine 0.71, glucose of 128, anion gap 7,   calcium 9.3, heart rate 50, total bilirubin 1.3, total protein 7.3, albumin   4.3, ALT of 30, AST of 10, alkaline phosphatase 91. WBC is 8.4,   hemoglobin 14.1, hematocrit 41.6, platelets 246, neutrophils 73%. CT abdomen and pelvis with IV contrast results I reviewed and noted   in HPI. IMPRESSION AND PLAN: A 74-year-old female with noted past   medical history, now admitted with a pancreatic mass, generalized   abdominal pain, nausea, vomiting, diarrhea. 1. Pancreatic mass. Concern for malignant neoplasm. Admit patient to   medical floor and follow with general surgeon. 2. Generalized abdominal pain. Plan as noted above. Keep n.p.o. on IV   fluids for now. Provide pain management and supportive care. 3. Nausea/vomiting. Order Zofran 4 mg IV q.6h. p.r.n.   4. Chronic pain syndrome. Symptoms likely attributable to current   diagnosis. Continued workup as noted and provide supportive cares. 5. Diarrhea. Order stool for Clostridium difficile, stool occult blood test,   and stool culture. 6. Hyperbilirubinemia. Repeat comprehensive metabolic panel. 7. Weight loss. Continue workup as noted. Check prealbumin level. 8. Chronic dizziness, vertigo. Patient follow precautions, DVT   prophylaxis, SCDs bilateral lower extremities. MD Sebastián Peck / Libby Moon   D:  05/08/2017   05:55   T:  05/08/2017   10:18   Job #:  [id number]

## 2017-05-08 NOTE — ED TRIAGE NOTES
TRIAGE NOTE:  Patient arrives with c/o lower back pain, and abdominal pain all over, and nausea and vomiting and chronic fatigue.

## 2017-05-08 NOTE — PROGRESS NOTES
Physical Therapy Screening:  Services are not indicated at this time. [facility] was triggered for physical therapy based on results obtained during the nursing admission assessment. The patients chart was reviewed and the patient is not appropriate for a skilled therapy evaluation at this time. Please consult physical therapy if any therapy needs arise. Thank you.     Gayle Milton, PT

## 2017-05-08 NOTE — PROGRESS NOTES
..Bedside shift change report given to [facility] ([facility]) (oncoming nurse) by Everton Fung, [address] [place] [address] (offgoing nurse). Report included the following information SBAR, Kardex and MAR.

## 2017-05-08 NOTE — CONSULTS
General Surgery In-Patient Consultation    Admit Date: 5/8/2017  Reason for Consultation: pancreatic mass    HPI:  Eli Blanco is a 60 y.o. female whom we are asked to see in consultation by Dr. Arti Lynn for the above complaint. Pt presented to the ED this am w/ c/o abdominal pain. The pain started in Dec in the mid abd radiating across the upper abd nellie on the L. She was also dealing w/ muscle pain in that area as well as in her back and neck. She has been in PT and being treated for myofascial pain syndrome. Over the past month she started losing her appetite and has been losing weight - \"a half a pound per day\". +n/v, this past week and diarrhea on and off for about a month. She has also been very depressed and knew something else was going on.      CT SCAN  1. No acute process on CT. 2. 2.5 cm pancreatic body lesion may represent malignant neoplasm and involves  the splenic artery and celiac artery.  Consider nonemergent endoscopic ultrasound  and biopsy versus MRI abdomen.           Patient Active Problem List    Diagnosis Date Noted    Generalized abdominal pain 05/08/2017    Pancreatic mass 05/08/2017    Obstructive sleep apnea (adult) (pediatric) 10/01/2012    Postmenopause, LMP 51yo, NO HRT 10/05/2010    S/P normal colonoscopy 2004 10/05/2010    Allergic rhinitis 10/05/2010    Allergic cough 10/05/2010     Past Medical History:   Diagnosis Date    Allergic cough 10/5/2010    AR (allergic rhinitis)     cough    GERD (gastroesophageal reflux disease)     Tx FOR \"REALLY BAD HEARTBURN\" IN 2013    Nausea & vomiting     PMS (premenstrual syndrome) 12/26/14    IN PAST, WAS SEVERE; MENOPAUSE 10 YRS AGO, PT SAYS    Postmenopause, LMP 49yo, NO HRT 10/5/2010    S/P normal colonoscopy 2004 10/5/2010    Unspecified sleep apnea 12/26/14    USES CPAP      Past Surgical History:   Procedure Laterality Date    DEXA BONE DENSITY STUDY AXIAL  2/2004    Normal; Dr Mil Nina (prev Gyn)   [address]  32yo    benign AUB; negative    ENDOSCOPY, COLON, DIAGNOSTIC  12/26/14    LAST COLONOSCOPY JAN 2014; PRIOR SCOPE 2004    HX RHINOPLASTY  1986    w/ chin implant (mentoplasty)    HX WISDOM TEETH EXTRACTION      HI OUTER EAR SURGERY PROC UNLISTED  1981    excision benign tumor behind right ear      Social History   Substance Use Topics    Smoking status: Former Smoker     Years: 6.00     Quit date: 9/27/1980    Smokeless tobacco: Never Used      Comment: used to smoke about 2 packs a week from college 1974 through [address] Alcohol use No      Comment: very very rare      Family History   Problem Relation Age of Onset   [facility] Stroke Mother     Heart Disease Mother     Heart Disease Father     Arthritis-rheumatoid Sister     Cancer Brother      skin cancer    HIV/AIDS Brother     Stroke Brother 79     TIA's    Heart Disease Brother 79     CABG    Stroke Maternal Grandmother     Heart Attack Maternal Grandfather     Heart Attack Paternal Grandmother     Heart Attack Paternal Grandfather     Colon Cancer Sister     Anesth Problems Neg Hx       Prior to Admission medications    Medication Sig Start Date End Date Taking? Authorizing Provider   CHOLECALCIFEROL, VITAMIN D3, (VITAMIN D3 PO) Take  by mouth daily. Historical Provider   MAGNESIUM PO Take  by mouth daily. Historical Provider   loratadine (CLARITIN) 10 mg tablet Take 10 mg by mouth daily. Historical Provider   ascorbic acid (VITAMIN C) 500 mg tablet Take 500 mg by mouth daily. 10/5/10   Historical Provider   calcium-vitamin D (CALCIUM+D) 500 mg(1,250mg) -200 unit per tablet Take 1 Tab by mouth daily. 10/5/10   Historical Provider   vitamin E (AQUA GEMS) 400 unit capsule Take  by mouth daily. Historical Provider   ferrous sulfate (IRON) 325 mg (65 mg Iron) tablet Take  by mouth daily (before breakfast). Historical Provider   cyanocobalamin (VITAMIN B-12) 1,000 mcg TbSR Take  by mouth daily.  10/5/10   Historical Provider GLUC HCL/CSANA/GLY-AM-GLY,MX/C (COSAMIN DS PO) Take  by mouth daily. 10/5/10   Historical Provider   aspirin delayed-release 81 mg tablet Take 81 mg by mouth daily. Historical Provider     Current Facility-Administered Medications   Medication Dose Route Frequency    sodium chloride (NS) flush 5-10 mL  5-10 mL IntraVENous Q8H    sodium chloride (NS) flush 5-10 mL  5-10 mL IntraVENous PRN    ondansetron (ZOFRAN) injection 4 mg  4 mg IntraVENous Q6H PRN    0.9% sodium chloride infusion  100 mL/hr IntraVENous CONTINUOUS    HYDROmorphone (PF) (DILAUDID) injection 1 mg  1 mg IntraVENous Q4H PRN     No Known Allergies       Subjective:     Review of Systems:    A comprehensive review of systems was negative except for that written in the History of Present Illness. Objective:     Blood pressure 130/69, pulse (!) 58, temperature 97.9 °F (36.6 °C), resp. rate 16, height 5' 7\" (1.702 m), weight 191 lb 9.6 oz (86.9 kg), last menstrual period 10/01/2005, SpO2 96 %. Temp (24hrs), Av.9 °F (36.6 °C), Min:97.8 °F (36.6 °C), Max:98.1 °F (36.7 °C)      Recent Labs      17   0015   WBC  8.4   HGB  14.1   HCT  41.6   PLT  168     Recent Labs      17   0015   NA  138   K  4.0   CL  102   CO2  29   GLU  128*   BUN  12   CREA  0.71   CA  9.3   ALB  4.3   TBILI  1.3*   SGOT  10*   ALT  30     No results for input(s): AML, LPSE in the last 72 hours. No intake or output data in the 24 hours ending 17 1044     _____________________  Physical Exam:     General:  Alert, cooperative, teary-eyed    Eyes:   PERRL, EOMs intact. Sclera clear. Throat: Lips, mucosa, and tongue normal.   Neck: Supple, symmetrical, trachea midline. Lungs:   Clear to auscultation bilaterally. Heart:  Regular rate and rhythm. Abdomen:   Normal BS, soft, TTP in mid and L upper abdomen. . No masses,  No organomegaly. Extremities: Extremities normal, atraumatic, no cyanosis or edema.    Skin: Skin color, texture, turgor normal. No rashes or lesions. Assessment:   Principal Problem:    Pancreatic mass (5/8/2017)    Active Problems:    Generalized abdominal pain (5/8/2017)            Plan:   Ongoing w/u pancreatic Ca  GI go do EUS   Dr Aiken Click to see tomorrow    Thank you for allowing us to participate in the care of this patient. Total time spent with patient: 30 minutes. Signed By: Noble Law NP     May 8, 2017            ATTENDING ADDENDUM  I supervised the APC and reviewed the note. We discussed the plan of care  She is really feeling better with modest pain meds. Awaiting the results of the FNA. Told her I would be back when we have more information for her.

## 2017-05-09 NOTE — PROCEDURES
[facility]       Endoscopic Ultrasound    NAME:  Aileen Gómez   :   1955   MRN:   [id number]       Procedure Type: Endoscopic Ultrasound    Indications: Abnormal CT scan showing pancreas mass    Pre-operative Diagnosis: see indication above    Post-operative Diagnosis:  See findings below    : Aiden Wheat MD    Referring Provider: --Ananya Mclaughlin DO    Procedure Details:      Anethesia/Sedation:  MAC anesthesia Propofol      Procedure Details   After infom consent was obtained for the procedure, with all risks and benefits of procedure explained the patient was taken to the endoscopy suite and placed in the left lateral decubitus position. Following sequential administration of sedation as per above, the egd then linear echoendoscope were inserted into the mouth and advanced under direct vision to third portion of the duodenum. A careful inspection was made as the gastroscope was withdrawn, including a retroflexed view of the proximal stomach; findings and interventions are described below. Findings:     Endoscopic:   Esophagus:normal   Stomach: normal    Duodenum/jejunum: normal      Ultrasound:   Esophagus: normal findings   Stomach: normal findings   Pancreas:     Areas examined: the entire gland    Parenchyma: -tumor/mass, located in  the body of pancreas, 2.5 x 2 cm, with irregular borders, encasing the splenic artery and celiac artery then i performed FNB with 22 gauge needle ( Mungo) multiple passes done, preliminary reading showed cancer cells    12 x10 mm lymph node in celiac axis, FNB was done with 25 gauge needle, multiple passes obtained    Pancreatic Duct: dilated up to 6 mm distal to mass   Liver:     Parenchyma: normal    Gallbladder: normal    Bile Duct: the entire biliary tree               Lymph Node: no adenopathy         Specimen Removed:  as above    Complications: None.      EBL: None.    Interventions: as above    Recommendations: follow up on pathology  Follow Dr. Kwadwo Elise recommendation  clear liquids    Signed By: Sathish Fong MD     5/9/2017  11:53 AM

## 2017-05-09 NOTE — ROUTINE PROCESS
Anna Grubern  1955  [id number]    Situation:  Verbal report received from: RN Bing Huerta  Procedure: Procedure(s) with comments:  ENDOSCOPIC ULTRASOUND (EUS) - eus  FINE NEEDLE ASPIRATION  ESOPHAGOGASTRODUODENOSCOPY (EGD)    Background:    Preoperative diagnosis: mass  Postoperative diagnosis: Pancreatic Mass    :  Dr. Vonnie Robertson  Assistant(s): Endoscopy Technician-1: Kailey Lea IV  Endoscopy RN-1: Prabhu Jimenes RN    Specimens: * No specimens in log *  H. Pylori  no    Assessment:  Intra-procedure medications       Anesthesia gave intra-procedure sedation and medications, see anesthesia flow sheet yes    Intravenous fluids:   450 NS@ KVO     Vital signs stable yes    Abdominal assessment: round and soft yes    Recommendation:  Discharge patient per MD order NO.   Return to floor yes  Family or Friend :   Permission to share finding with family or friend yes

## 2017-05-09 NOTE — ANESTHESIA POSTPROCEDURE EVALUATION
Post-Anesthesia Evaluation and Assessment    Patient: Marko Aguirre MRN: [id number]  SSN: [id number]    YOB: 1955  Age: 60 y.o. Sex: female       Cardiovascular Function/Vital Signs  Visit Vitals    /66    Pulse (!) 57    Temp 36.6 °C (97.9 °F)    Resp 13    Ht 5' 7\" (1.702 m)    Wt 87.1 kg (192 lb)    SpO2 99%    BMI 30.07 kg/m2       Patient is status post MAC anesthesia for Procedure(s):  ENDOSCOPIC ULTRASOUND (EUS)  FINE NEEDLE ASPIRATION  ESOPHAGOGASTRODUODENOSCOPY (EGD). Nausea/Vomiting: None    Postoperative hydration reviewed and adequate. Pain:  Pain Scale 1: Numeric (0 - 10) (05/09/17 1205)  Pain Intensity 1: 0 (05/09/17 1200)   Managed    Neurological Status: At baseline    Mental Status and Level of Consciousness: Arousable    Pulmonary Status:   O2 Device: Room air (05/09/17 1205)   Adequate oxygenation and airway patent    Complications related to anesthesia: None    Post-anesthesia assessment completed.  No concerns    Signed By: Zelalem Jain MD     May 9, 2017

## 2017-05-09 NOTE — ANESTHESIA PREPROCEDURE EVALUATION
Anesthetic History   No history of anesthetic complications  PONV and history of awareness of surgery under anesthesia          Review of Systems / Medical History  Patient summary reviewed, nursing notes reviewed and pertinent labs reviewed    Pulmonary  Within defined limits      Sleep apnea: CPAP           Neuro/Psych   Within defined limits           Cardiovascular  Within defined limits                     GI/Hepatic/Renal  Within defined limits   GERD           Endo/Other  Within defined limits           Other Findings              Physical Exam    Airway  Mallampati: II  TM Distance: > 6 cm  Neck ROM: normal range of motion   Mouth opening: Normal     Cardiovascular  Regular rate and rhythm,  S1 and S2 normal,  no murmur, click, rub, or gallop             Dental    Dentition: Caps/crowns     Pulmonary  Breath sounds clear to auscultation               Abdominal  GI exam deferred       Other Findings            Anesthetic Plan    ASA: 2  Anesthesia type: MAC          Induction: Intravenous  Anesthetic plan and risks discussed with: Patient

## 2017-05-09 NOTE — PROGRESS NOTES
Bedside shift change report given to Kali Pitts RN (oncoming nurse) by Tiffanie Banks RN (offgoing nurse). Report included the following information SBAR and Kardex.

## 2017-05-09 NOTE — PROGRESS NOTES
..Bedside shift change report given to KHURRAM Alfaro (oncoming nurse) by Natasha Dumas (offgoing nurse). Report included the following information SBAR, Kardex and MAR.

## 2017-05-09 NOTE — ROUTINE PROCESS
TRANSFER - OUT REPORT:    Verbal report given to KHURRAM Drew  on [place]  being transferred to Room 620  for routine progression of care       Report consisted of patients Situation, Background, Assessment and   Recommendations(SBAR). Information from the following report(s) Procedure Summary was reviewed with the receiving nurse. Lines:   Peripheral IV 05/08/17 Left Antecubital (Active)   Site Assessment Clean, dry, & intact 5/8/2017  6:25 AM   Phlebitis Assessment 0 5/8/2017  6:25 AM   Infiltration Assessment 0 5/8/2017  6:25 AM   Dressing Status Clean, dry, & intact 5/8/2017  6:25 AM   Dressing Type Transparent 5/8/2017  6:25 AM   Hub Color/Line Status Pink;Flushed; Infusing 5/8/2017  6:25 AM   Action Taken Tubing changed 5/8/2017  6:25 AM   Alcohol Cap Used Yes 5/8/2017  6:25 AM       Peripheral IV 05/08/17 Left Arm (Active)   Site Assessment Clean, dry, & intact 5/9/2017 10:39 AM   Phlebitis Assessment 0 5/9/2017 10:39 AM   Infiltration Assessment 0 5/9/2017 10:39 AM   Dressing Status Clean, dry, & intact 5/9/2017 10:39 AM   Dressing Type Transparent 5/9/2017 10:39 AM   Hub Color/Line Status Blue 5/9/2017 10:39 AM        Opportunity for questions and clarification was provided.       Alexander Martin RN

## 2017-05-09 NOTE — PROGRESS NOTES
Hospitalist Progress Note  Office: [phone]      Date of Service:  2017  NAME:  Yohannes Whalen  :  1955  MRN:  [id number]      Admission Summary:   61 yo woman with GERD, EMERY on CPAP, chronic generalized pain presented from home on 17 with abdominal pain, nausea, vomiting, and diarrhea. CT abd/pelvis in the ED revealed pancreatic mass. She was admitted to the medical floor with newly discovered pancreatic mass. Interval history / Subjective:   Saw patient prior to her procedure. She stated she has not felt this better for months,pain well controlled. Assessment & Plan:     Pancreatic mass (POA)  - seen on CT abd/pelvis and concerning for malignancy  - unintentional weight loss of about 20 pounds in the last year  - General Surgery consulted: recommended GI consult for EUS  - EUS today    Acute pancreatitis  -Lipase peaked>3000,coming down now  - pain control  - antiemetic prn  - keep NPO on IVF     Chronic pain syndrome   - pain control, would benefit from outpatient pain management referral    Diarrhea (PTA)  - resolved, last BM was     Unintentional weight loss  - check prealbumin  - concerning for underlying malignancy with pancreatic mass    EMERY on CPAP -  to bring in CPAP from home    Code status: Full  DVT prophylaxis: SCDs    Care Plan discussed with: Patient/Family and Nurse  Disposition: TBD. Came from home     Hospital Problems  Date Reviewed: 2017          Codes Class Noted POA    Generalized abdominal pain ICD-10-CM: R10.84  ICD-9-CM: 789.07  2017 Unknown        * (Principal)Pancreatic mass ICD-10-CM: K86.9  ICD-9-CM: 577.9  2017 Unknown            Review of Systems:   Pertinent items are noted in HPI. Vital Signs:    Last 24hrs VS reviewed since prior progress note.  Most recent are:  Visit Vitals    /76    Pulse 67    Temp 97.9 °F (36.6 °C)    Resp 15    Ht 5' 7\" (1.702 m)    Wt 87.1 kg (192 lb)    SpO2 97%    BMI 30.07 kg/m2       Intake/Output Summary (Last 24 hours) at 05/09/17 1226  Last data filed at 05/09/17 1056   Gross per 24 hour   Intake              250 ml   Output             2000 ml   Net            -1750 ml      Physical Examination:     Constitutional:  awake, no acute distress, cooperative, pleasant    ENT:  oral mucosa moist, oropharynx benign  Neck supple   Resp:  CTA bilaterally, no wheezing/rhonchi/rales   CV:  regular rhythm, normal rate, no m/r/g appreciated, no edema, +pulses    GI:  +BS, soft, non distended, mild LUQ TTP     Musculoskeletal:  moves all extremities    Neurologic:  AAOx3, NFD     Skin:  warm, dry  Eyes:  PERRL    Data Review:    Review and/or order of clinical lab test  Review and/or order of tests in the radiology section of CPT  Review and/or order of tests in the medicine section of CPT    Labs:     Recent Labs      05/09/17   0322  05/08/17   0015   WBC  5.6  8.4   HGB  13.2  14.1   HCT  39.0  41.6   PLT  143*  168     Recent Labs      05/09/17   0322  05/08/17   0015   NA  140  138   K  3.6  4.0   CL  106  102   CO2  27  29   BUN  8  12   CREA  0.55  0.71   GLU  106*  128*   CA  8.6  9.3     Recent Labs      05/09/17   0322  05/08/17   0015   SGOT  9*  10*   ALT  24  30   AP  77  91   TBILI  1.4*  1.3*   TP  6.1*  7.3   ALB  3.6  4.3   GLOB  2.5  3.0   LPSE  1400*  >3000*     No results for input(s): INR, PTP, APTT in the last 72 hours. No lab exists for component: INREXT, INREXT   No results for input(s): FE, TIBC, PSAT, FERR in the last 72 hours. No results found for: FOL, RBCF   No results for input(s): PH, PCO2, PO2 in the last 72 hours. No results for input(s): CPK, CKNDX, TROIQ in the last 72 hours.     No lab exists for component: CPKMB  Lab Results   Component Value Date/Time    Cholesterol, total 209 09/13/2012 12:11 PM    HDL Cholesterol 57 09/13/2012 12:11 PM    LDL, calculated 125 09/13/2012 12:11 PM    Triglyceride 134 09/13/2012 12:11 PM    CHOL/HDL Ratio 3.5 10/05/2010 09:35 AM     No results found for: [facility]  Lab Results   Component Value Date/Time    Color DARK YELLOW 10/07/2010 02:35 PM    Appearance CLEAR 10/07/2010 02:35 PM    Specific gravity 1.006 10/07/2010 02:35 PM    pH (UA) 7.0 10/07/2010 02:35 PM    Protein NEGATIVE  10/07/2010 02:35 PM    Glucose NEGATIVE  10/07/2010 02:35 PM    Ketone NEGATIVE  10/07/2010 02:35 PM    Bilirubin NEGATIVE  10/07/2010 02:35 PM    Urobilinogen 0.2 10/07/2010 02:35 PM    Nitrites NEGATIVE  10/07/2010 02:35 PM    Leukocyte Esterase NEGATIVE  10/07/2010 02:35 PM    Epithelial cells 0-5 10/07/2010 02:35 PM    Bacteria NEGATIVE  10/07/2010 02:35 PM    WBC 0-4 10/07/2010 02:35 PM    RBC 0-3 10/07/2010 02:35 PM     Medications Reviewed:     Current Facility-Administered Medications   Medication Dose Route Frequency    0.9% sodium chloride infusion  100 mL/hr IntraVENous CONTINUOUS    sodium chloride (NS) flush 5-10 mL  5-10 mL IntraVENous Q8H    sodium chloride (NS) flush 5-10 mL  5-10 mL IntraVENous PRN    midazolam (VERSED) injection 0.25-10 mg  0.25-10 mg IntraVENous Multiple    fentaNYL citrate (PF) injection 200 mcg  200 mcg IntraVENous Multiple    naloxone (NARCAN) injection 0.4 mg  0.4 mg IntraVENous Multiple    flumazenil (ROMAZICON) 0.1 mg/mL injection 0.2 mg  0.2 mg IntraVENous Multiple    simethicone (MYLICON) 50YG/7.4WX oral drops 80 mg  1.2 mL Oral Multiple    atropine injection 0.5 mg  0.5 mg IntraVENous ONCE PRN    EPINEPHrine (ADRENALIN) 0.1 mg/mL syringe 1 mg  1 mg Endoscopically ONCE PRN    sodium chloride (NS) flush 5-10 mL  5-10 mL IntraVENous Q8H    sodium chloride (NS) flush 5-10 mL  5-10 mL IntraVENous PRN    ondansetron (ZOFRAN) injection 4 mg  4 mg IntraVENous Q6H PRN    0.9% sodium chloride infusion  100 mL/hr IntraVENous CONTINUOUS    HYDROmorphone (PF) (DILAUDID) injection 1 mg  1 mg IntraVENous Q4H PRN    LORazepam (ATIVAN) injection 0.5-1 mg 0. 5-1 mg IntraVENous Q6H PRN    pantoprazole (PROTONIX) 40 mg in sodium chloride 0.9 % 10 mL injection  40 mg IntraVENous DAILY     ______________________________________________________________________  EXPECTED LENGTH OF STAY: 2d 16h  ACTUAL LENGTH OF STAY:          1                 Johnita Cockayne, MD

## 2017-05-10 NOTE — PROGRESS NOTES
Hospitalist Progress Note  Office: [phone]      Date of Service:  5/10/2017  NAME:  Gabbie Mackay  :  1955  MRN:  [id number]      Admission Summary:   59 yo woman with GERD, EMERY on CPAP, chronic generalized pain presented from home on 17 with abdominal pain, nausea, vomiting, and diarrhea. CT abd/pelvis in the ED revealed pancreatic mass. She was admitted to the medical floor with newly discovered pancreatic mass. Interval history / Subjective:   Feels great. Assessment & Plan:     Pancreatic mass (POA)  - seen on CT abd/pelvis and concerning for malignancy  - unintentional weight loss of about 20 pounds in the last year  - EUS yesterday prelim +cancer cells, path pending.  -Surgery and oncology consulted per GI    Acute pancreatitis  -Lipase peaked>3000,normalized  -Pain much better  - Advance diet    Chronic pain syndrome   - pain control, would benefit from outpatient pain management referral    Diarrhea (PTA)  - resolved, last BM was 5/6    Unintentional weight loss  - concerning for underlying malignancy with pancreatic mass    EMERY on CPAP -using own CPAP in the hospital.  Code status: Full  DVT prophylaxis: SCDs    Care Plan discussed with: Patient/Family and Nurse  Disposition: TBD. Came from home   -Decision for surgery pending final path report. Hospital Problems  Date Reviewed: 2017          Codes Class Noted POA    Generalized abdominal pain ICD-10-CM: R10.84  ICD-9-CM: 789.07  2017 Unknown        * (Principal)Pancreatic mass ICD-10-CM: K86.9  ICD-9-CM: 577.9  2017 Unknown            Review of Systems:   Pertinent items are noted in HPI. Vital Signs:    Last 24hrs VS reviewed since prior progress note.  Most recent are:  Visit Vitals    /75 (BP 1 Location: Right arm, BP Patient Position: Sitting)    Pulse 71    Temp 98.1 °F (36.7 °C)    Resp 18    Ht 5' 7\" (1.702 m)    Wt 87.1 kg (192 lb)  SpO2 96%    BMI 30.07 kg/m2       Intake/Output Summary (Last 24 hours) at 05/10/17 1142  Last data filed at 05/10/17 0954   Gross per 24 hour   Intake                0 ml   Output             2500 ml   Net            -2500 ml      Physical Examination:     Constitutional:  awake, no acute distress, cooperative, pleasant    ENT:  oral mucosa moist, oropharynx benign  Neck supple   Resp:  CTA bilaterally, no wheezing/rhonchi/rales   CV:  regular rhythm, normal rate, no m/r/g appreciated, no edema, +pulses    GI:  +BS, soft, non distended, mild LUQ TTP     Musculoskeletal:  moves all extremities    Neurologic:  AAOx3, NFD     Skin:  warm, dry  Eyes:  PERRL    Data Review:    Review and/or order of clinical lab test  Review and/or order of tests in the radiology section of CPT  Review and/or order of tests in the medicine section of CPT    Labs:     Recent Labs      05/09/17 0322  05/08/17   0015   WBC  5.6  8.4   HGB  13.2  14.1   HCT  39.0  41.6   PLT  143*  168     Recent Labs      05/09/17   0322  05/08/17   0015   NA  140  138   K  3.6  4.0   CL  106  102   CO2  27  29   BUN  8  12   CREA  0.55  0.71   GLU  106*  128*   CA  8.6  9.3     Recent Labs      05/10/17   0317  05/09/17 0322  05/08/17   0015   SGOT   --   9*  10*   ALT   --   24  30   AP   --   77  91   TBILI   --   1.4*  1.3*   TP   --   6.1*  7.3   ALB   --   3.6  4.3   GLOB   --   2.5  3.0   LPSE  171  1400*  >3000*     No results for input(s): INR, PTP, APTT in the last 72 hours. No lab exists for component: INREXT, INREXT   No results for input(s): FE, TIBC, PSAT, FERR in the last 72 hours. No results found for: FOL, RBCF   No results for input(s): PH, PCO2, PO2 in the last 72 hours. No results for input(s): CPK, CKNDX, TROIQ in the last 72 hours.     No lab exists for component: CPKMB  Lab Results   Component Value Date/Time    Cholesterol, total 209 09/13/2012 12:11 PM    HDL Cholesterol 57 09/13/2012 12:11 PM    LDL, calculated 125 09/13/2012 12:11 PM    Triglyceride 134 09/13/2012 12:11 PM    CHOL/HDL Ratio 3.5 10/05/2010 09:35 AM     No results found for: [facility]  Lab Results   Component Value Date/Time    Color DARK YELLOW 10/07/2010 02:35 PM    Appearance CLEAR 10/07/2010 02:35 PM    Specific gravity 1.006 10/07/2010 02:35 PM    pH (UA) 7.0 10/07/2010 02:35 PM    Protein NEGATIVE  10/07/2010 02:35 PM    Glucose NEGATIVE  10/07/2010 02:35 PM    Ketone NEGATIVE  10/07/2010 02:35 PM    Bilirubin NEGATIVE  10/07/2010 02:35 PM    Urobilinogen 0.2 10/07/2010 02:35 PM    Nitrites NEGATIVE  10/07/2010 02:35 PM    Leukocyte Esterase NEGATIVE  10/07/2010 02:35 PM    Epithelial cells 0-5 10/07/2010 02:35 PM    Bacteria NEGATIVE  10/07/2010 02:35 PM    WBC 0-4 10/07/2010 02:35 PM    RBC 0-3 10/07/2010 02:35 PM     Medications Reviewed:     Current Facility-Administered Medications   Medication Dose Route Frequency    sodium chloride (NS) flush 5-10 mL  5-10 mL IntraVENous Q8H    sodium chloride (NS) flush 5-10 mL  5-10 mL IntraVENous PRN    ondansetron (ZOFRAN) injection 4 mg  4 mg IntraVENous Q6H PRN    0.9% sodium chloride infusion  100 mL/hr IntraVENous CONTINUOUS    HYDROmorphone (PF) (DILAUDID) injection 1 mg  1 mg IntraVENous Q4H PRN    LORazepam (ATIVAN) injection 0.5-1 mg  0.5-1 mg IntraVENous Q6H PRN    pantoprazole (PROTONIX) 40 mg in sodium chloride 0.9 % 10 mL injection  40 mg IntraVENous DAILY     ______________________________________________________________________  EXPECTED LENGTH OF STAY: 5d 0h  ACTUAL LENGTH OF STAY:          2                 Aleisha De Los Santos MD

## 2017-05-10 NOTE — PROGRESS NOTES
Bedside shift change report given to Chi Loomis RN (oncoming nurse) by Ashley Kaye RN (offgoing nurse). Report included the following information SBAR and Kardex.

## 2017-05-10 NOTE — PROGRESS NOTES
Problem: Discharge Planning  Goal: *Discharge to safe environment  Outcome: Progressing Towards Goal  Disposition Needs:  Patient's plan at discharge to return home with family assistance. There are no CM consults or needs at this time. Mode of transport at time of discharge to be provided by patient's spouse.      FLORENTINO Burroughs/JEANINE  2:51 PM

## 2017-05-10 NOTE — PROGRESS NOTES
Spiritual Care Partner Volunteer visited patient in [address] on 5/10/17. Documented by:  [place] M.Div.    Paging Service [phone])

## 2017-05-10 NOTE — PROGRESS NOTES
Daily Progress Note  [facility] General Surgery at [address] Date: 2017  Post-Operative Day: 1 from Procedure(s) with comments:  ENDOSCOPIC ULTRASOUND (EUS) - eus  FINE NEEDLE ASPIRATION  ESOPHAGOGASTRODUODENOSCOPY (EGD)     Subjective:     Last 24 hrs: Feeling better today. Maggie Cid for lunchtime diet advancement. Asking to arrange time of Dr. Jerrod Verdin visit so her  can be present. Objective:     Blood pressure 120/75, pulse 71, temperature 98.1 °F (36.7 °C), resp. rate 18, height 5' 7\" (1.702 m), weight 87.1 kg (192 lb), last menstrual period 10/01/2005, SpO2 96 %. Temp (24hrs), Av °F (36.7 °C), Min:97.7 °F (36.5 °C), Max:98.1 °F (36.7 °C)      _____________________  Physical Exam:     Alert and Oriented, ambulating around room, in no acute distress. Cardiovascular: RRR, no peripheral edema  Abdomen: soft, NT.        Assessment:   Principal Problem:    Pancreatic mass (2017)    Active Problems:    Generalized abdominal pain (2017)            Plan:     Dr. Lori Hobbs to round this evening around 5p        [facility], [address]  Surgery at [place] [address], [address] [place] [address]  [phone]    Data Review:    Recent Labs      17   03217   0015   WBC  5.6  8.4   HGB  13.2  14.1   HCT  39.0  41.6   PLT  143*  168     Recent Labs      17   0322  17   0015   NA  140  138   K  3.6  4.0   CL  106  102   CO2  27  29   GLU  106*  128*   BUN  8  12   CREA  0.55  0.71   CA  8.6  9.3   ALB  3.6  4.3   TBILI  1.4*  1.3*   SGOT  9*  10*   ALT  24  30     Recent Labs      05/10/17   0317  17   0322  17   0015   LPSE  171  1400*  >3000*           ______________________  Medications:    Current Facility-Administered Medications   Medication Dose Route Frequency    sodium chloride (NS) flush 5-10 mL  5-10 mL IntraVENous Q8H    sodium chloride (NS) flush 5-10 mL  5-10 mL IntraVENous PRN    ondansetron (ZOFRAN) injection 4 mg  4 mg IntraVENous Q6H PRN    0.9% sodium chloride infusion  100 mL/hr IntraVENous CONTINUOUS    HYDROmorphone (PF) (DILAUDID) injection 1 mg  1 mg IntraVENous Q4H PRN    LORazepam (ATIVAN) injection 0.5-1 mg  0.5-1 mg IntraVENous Q6H PRN    pantoprazole (PROTONIX) 40 mg in sodium chloride 0.9 % 10 mL injection  40 mg IntraVENous DAILY                                                                                               ATTENDING ADDENDUM  I supervised the APC and reviewed the note. We discussed the plan of care  Had a 30 minute discussion with her and her  about every possible contingency, even though we dont even know what we are dealing with.   Nevertheless, I think they have a good understanding of the possibilities going forward

## 2017-05-10 NOTE — TELEPHONE ENCOUNTER
Yan Rice  1955    Pancreatic mass with preliminary read ca.   Umair Reasons NP  Rm 620, Bed 1  [phone]

## 2017-05-10 NOTE — PROGRESS NOTES
Chart Reviewed:  CM met with patient at bedside. Patient was alert and oriented to all spheres. CM introduced self and explained transitions of care role. CM verified demographics, insurance coverage, and PCP. Patient is a 60year old female with an admitting diagnosis of pancreatic mass. Patient resides at home with her spouse, Malaika Dickinson [phone] in their own 1 story home with 4-5 steps to enter the home from the front entrance and back entrance. Patient has no DME needs at this time, but equipment in the home consists of the following: Wright Simmers, and Crutches. Patient is independent with ADL's and IADL's. Patient is currently not employed. Patient's source of income at this time is her husbands source of income. Spouse currently works for [employer]. Patient reports no financial concerns or stressors at this time. Patient utilizes [facility] on [facility] [facility] [address] and [facility] [address] for prescriptions. Mode of transport at time of discharge to be provided by patient's spouse, Kerri Patterson. There are no CM consults or needs at this time. CM will continue to follow and assist with disposition needs as they arise. Care Management Interventions  PCP Verified by CM: Yes (Last saw PCP in March 2017)  Palliative Care Consult (Criteria: CHF and RRAT>21): No  Reason for No Palliative Care Consult:  Other (see comment) (Does not meet criteria)  Mode of Transport at Discharge: BLS (Spouse to transport at discharge)  Transition of Care Consult (CM Consult):  (There are no CM consults at this time)  Discharge Durable Medical Equipment: No  Physical Therapy Consult: No  Occupational Therapy Consult: No  Speech Therapy Consult: No  Current Support Network: Lives with Spouse, Own Home  Confirm Follow Up Transport: Family (Spouse to transport at discharge)  Plan discussed with Pt/Family/Caregiver: Yes  Freedom of Choice Offered: Yes  Discharge Location  Discharge Placement: Home with family assistance      Timmy Cullen FLORENTINO York/CRM  2:48 PM

## 2017-05-10 NOTE — PROGRESS NOTES
Bedside and Verbal shift change report given to Niecy Colvin RN (oncoming nurse) by Mattie Yancey RN (offgoing nurse). Report included the following information SBAR, Kardex, MAR and Recent Results.

## 2017-05-10 NOTE — PROGRESS NOTES
[address]  [address], [address]       GI PROGRESS NOTE  [facility]  [phone]    NAME: Deysi Easley   :  1955   MRN:  [id number]       Subjective:     Doing well, no pain. Objective:     VITALS:   Last 24hrs VS reviewed since prior progress note. Most recent are:  Visit Vitals    /77 (BP 1 Location: Left arm, BP Patient Position: Sitting)    Pulse 85    Temp 98 °F (36.7 °C)    Resp 15    Ht 5' 7\" (1.702 m)    Wt 87.1 kg (192 lb)    SpO2 96%    BMI 30.07 kg/m2       PHYSICAL EXAM:  General: No acute distress    Neurologic:  Alert and oriented X 3. HEENT: PERRLA, EOMI. Lungs:  CTA Bilaterally. No Wheezing  Heart:  s1 s2, Regular  rhythm,  No murmur   Abdomen: Soft, Non distended, Non tender.  +Bowel sounds  Extremities: No edema  Psych:   Good insight. Not anxious nor agitated. Lab Data Reviewed:     Recent Results (from the past 24 hour(s))   LIPASE    Collection Time: 05/10/17  3:17 AM   Result Value Ref Range    Lipase 171 73 - 393 U/L         ________________________________________________________________________       Assessment:   · Pancreatic Mass - prelimnary reads + cancer cells, final biopsy pending. Appreciate Dr Abby Kruger assistance, will also ask oncology to see. Patient Active Problem List   Diagnosis Code    Postmenopause, LMP 54BZ, NO HRT Z78.0    S/P normal colonoscopy  Z98.890    Allergic rhinitis J30.9    Allergic cough R05    Obstructive sleep apnea (adult) (pediatric) G47.33    Generalized abdominal pain R10.84    Pancreatic mass K86.9     Plan:   · Await surgical and oncology plan  · Final biopsy pending     Signed By: Jeffery Hernandez NP     5/10/2017  9:38 AM                 I have examined the patient. I have reviewed the chart and agree with the documentation recorded by the NP, including the assessment, treatment plan, and disposition.         Yousif Hollis MD

## 2017-05-10 NOTE — CONSULTS
Hematology/Oncology Consult    REASON FOR CONSULT: Pancreatic Mass  REQUESTED BY: Liudmila oCol NP    HISTORY OF PRESENT ILLNESS: Ms. Quita De León is a 60 y.o. female who we are asked to see for new pancreatic mass. She presented to the ED on 5/8/17 with severe abdominal pain. She has been experiencing this for months now but states that location and intensity of abdominal pain can change and move at times from upper abdomen to lower abdomen. She has also been experiencing back/shoulder pain for which she had been trying to go to Physical Therapy to manage. This provided little relief for her. Medications over the counter had also provided little relief. She notes a significant decrease in her energy for months and tells me that little tasks through out the day such as cleaning or doing dishes would exhaust her, resting after minor household chores. She has been unable to cook dinner at night due to exhaustion. Her  noted this change in her as well. Her appetite has decreased recently and she has experienced unintentional weight loss of approximately 20 pounds since December. CT abdomen/pelvis completed in the ED revealed a 2.5cm pancreatic body lesion that involved the splenic artery and celiac artery for which we are consulted. Dr. Doroteo Poe with GI performed an EUS 5/9/17 with preliminary path malignant. Dr. Attila Obrien with Surgery has been consulted. Ms. Quita De León denies any personal history of cancer. Significant family history which includes her sister who passed away from colon cancer, her father who passed away from renal cancer, her older brother with skin cancer, and another brother with cancer of unknown etiology. Notes that her niece was recently diagnosed with ovarian cancer. She states she has had all routine cancer screenings such as mammograms/colonoscopies and these have been normal. She worked as an  and  over the years.  Denies any significant exposure to occupational hazards/chemicals. Does note that she [place] farm where pesticides were sprayed frequently. She tells me that she drank and smoked cigarettes while in college but quit at that time. Currently lives in [facility] with her . Today, she is resting in bed. Appropriately tearful at times. Her pain is well controlled during my visit. She is still feeling fatigued. Asking great questions.      Past Medical History:   Diagnosis Date    Allergic cough 10/5/2010    AR (allergic rhinitis)     cough    GERD (gastroesophageal reflux disease)     Tx FOR \"REALLY BAD HEARTBURN\" IN 2013    Nausea & vomiting     PMS (premenstrual syndrome) 12/26/14    IN PAST, WAS SEVERE; MENOPAUSE 10 YRS AGO, PT SAYS    Postmenopause, LMP 49yo, NO HRT 10/5/2010    S/P normal colonoscopy 2004 10/5/2010    Unspecified sleep apnea 12/26/14    USES CPAP       Past Surgical History:   Procedure Laterality Date    DEXA BONE DENSITY STUDY AXIAL  2/2004    Normal; Dr Nguyen Bhat (prev Gyn)   [id number] [facility]  00    benign AUB; negative    ENDOSCOPY, COLON, DIAGNOSTIC  12/26/14    LAST COLONOSCOPY JAN 2014; PRIOR SCOPE 2004    HX RHINOPLASTY  1986    w/ chin implant (mentoplasty)    HX WISDOM TEETH EXTRACTION      [place] OUTER EAR SURGERY PROC UNLISTED  1981    excision benign tumor behind right ear       No Known Allergies    Current Facility-Administered Medications   Medication Dose Route Frequency Provider Last Rate Last Dose    sodium chloride (NS) flush 5-10 mL  5-10 mL IntraVENous Q8H Dorothy Mejia MD   10 mL at 05/09/17 2123    sodium chloride (NS) flush 5-10 mL  5-10 mL IntraVENous PRN Dorothy Mejia MD   10 mL at 05/10/17 0621    ondansetron (ZOFRAN) injection 4 mg  4 mg IntraVENous Q6H PRN Dorothy Mejia MD   4 mg at 05/10/17 6143    0.9% sodium chloride infusion  100 mL/hr IntraVENous CONTINUOUS Dorothy Mejia  mL/hr at 05/10/17 1453 100 mL/hr at 05/10/17 1453    HYDROmorphone (PF) (DILAUDID) injection 1 mg  1 mg IntraVENous Q4H PRN Ava Wilkins MD   1 mg at 05/10/17 1458    LORazepam (ATIVAN) injection 0.5-1 mg  0.5-1 mg IntraVENous Q6H PRN Chela Crocker NP        pantoprazole (PROTONIX) 40 mg in sodium chloride 0.9 % 10 mL injection  40 mg IntraVENous DAILY Whitney Garcia NP   40 mg at 05/10/17 7084       Social History     Social History    Marital status:      Spouse name: N/A    Number of children: 0    Years of education: N/A     Occupational History    Homemaker; 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 [address] Alcohol use No      Comment: very very rare    Drug use: No    Sexual activity: Not Currently     Other Topics Concern    Occupational Exposure No     down to 1 cup of cooffee qam; was up to 3-4 cups up unitl ~ the past few weeks    Weight Concern Yes     happy she has lost 20 lbs since strict diet and exercise    Special Diet Yes     since beginning of August went on strict diet of carb cuonting, low fat, protein and calorie counts; well balanced    Exercise Yes     swims a lot and since August started walking about 2-5 x a week x 45 minutes     Social History Narrative       Family History   Problem Relation Age of Onset    Stroke Mother     Heart Disease Mother     Heart Disease Father     Arthritis-rheumatoid Sister     Cancer Brother      skin cancer    HIV/AIDS Brother     Stroke Brother 79     TIA's    Heart Disease Brother 79     CABG    Stroke Maternal Grandmother     Heart Attack Maternal Grandfather     Heart Attack Paternal Grandmother     Heart Attack Paternal Grandfather     Colon Cancer Sister     Anesth Problems Neg Hx        ROS  As per the HPI, otherwise a comprehensive ROS is negative. ECOG PS is 1. Emotional well being addressed and patient is coping appropriately to new cancer diagnosis. Physical Examination:   Visit Vitals    /79 (BP 1 Location: Right arm, BP Patient Position: At rest)    Pulse 80    Temp 98.7 °F (37.1 °C)    Resp 18    Ht 5' 7\" (1.702 m)    Wt 192 lb (87.1 kg)    LMP 10/01/2005 (Approximate)    SpO2 98%    BMI 30.07 kg/m2     General appearance - alert, pleasant, appropriately tearful at times, no distress  Mental status - oriented to person, place, and time  Mouth - mucous membranes moist, pharynx normal without lesions  Neck - supple, no significant adenopathy  Chest - clear to auscultation, no wheezes, rales or rhonchi, symmetric air entry  Heart - normal rate, regular rhythm, normal S1, S2, no murmurs, rubs, clicks or gallops  Abdomen - soft, non-tender to light palpation, +bowel sounds  Neurological - normal speech, no focal findings or movement disorder noted  Extremities - peripheral pulses normal, no pedal edema  Skin - normal coloration and turgor, no rashes, no suspicious skin lesions noted    LABS  Lab Results   Component Value Date/Time    WBC 5.6 05/09/2017 03:22 AM    HGB 13.2 05/09/2017 03:22 AM    HCT 39.0 05/09/2017 03:22 AM    PLATELET 236 90/42/0565 03:22 AM    MCV 90.1 05/09/2017 03:22 AM    ABS. NEUTROPHILS 3.0 05/09/2017 03:22 AM     Lab Results   Component Value Date/Time    Sodium 140 05/09/2017 03:22 AM    Potassium 3.6 05/09/2017 03:22 AM    Chloride 106 05/09/2017 03:22 AM    CO2 27 05/09/2017 03:22 AM    Glucose 106 05/09/2017 03:22 AM    BUN 8 05/09/2017 03:22 AM    Creatinine 0.55 05/09/2017 03:22 AM    GFR est AA >60 05/09/2017 03:22 AM    GFR est non-AA >60 05/09/2017 03:22 AM    Calcium 8.6 05/09/2017 03:22 AM     Lab Results   Component Value Date/Time    AST (SGOT) 9 05/09/2017 03:22 AM    Alk.  phosphatase 77 05/09/2017 03:22 AM    Protein, total 6.1 05/09/2017 03:22 AM    Albumin 3.6 05/09/2017 03:22 AM Globulin 2.5 05/09/2017 03:22 AM    A-G Ratio 1.4 05/09/2017 03:22 AM       PATHOLOGY  Awaiting final pathology from EUS 5/9/17    IMAGING  CT 5/8/17  IMPRESSION:  1. No acute process on CT. 2. 2.5 cm pancreatic body lesion may represent malignant neoplasm and involves  the splenic artery and celiac artery. Consider nonemergent endoscopic ultrasound  and biopsy versus MRI abdomen. ASSESSMENT  Ms. Augusto Hill is a 60 y.o. female with newly diagnosed pancreatic mass on CT imaging 5/8/17. Preliminary pathology is malignant. DISCUSSION/PLAN  1. Pancreatic mass, newly diagnosed. First noted on abdominal imaging. EUS with malignant cells. Discussed with her today that this is likely pancreatic cancer. Dr. Abigail Vera with Surgery is on board. Plan to discuss Ms. Tosha Rangel case tomorrow during Cancer Conference to determine best multidisciplinary approach for treatment moving forward. Will obtain CA 19-9 with morning labs and CT Chest tomorrow. Awaiting final pathology. 2. Abdominal pain. Likely multifactorial per GI. Well controlled during my visit today. PRN dilaudid. 3. Sleep apnea. Wears CPAP at home. Family to bring hers into the hospital.    4. Fatigue. Related to underlying malignancy. 5. Nausea. Intermittent. No vomiting today. GI is on board. PRN zofran as needed. Appreciate consultation and care of Ms. Augusto Hill. Please call with any questions. Seen in conjunction with Ben Harkins NP.     Merline Hastings, MD

## 2017-05-11 NOTE — PROGRESS NOTES
Hematology/Oncology Progress Note    REASON FOR VISIT: Pancreatic Mass    HISTORY OF PRESENT ILLNESS: Ms. Lc Burroughs is a 60 y.o. female who we are asked to see for new pancreatic mass. She presented to the ED on 5/8/17 with severe abdominal pain. She has been experiencing this for months now but states that location and intensity of abdominal pain can change and move at times from upper abdomen to lower abdomen. She has also been experiencing back/shoulder pain for which she had been trying to go to Physical Therapy to manage. This provided little relief for her. Medications over the counter had also provided little relief. She notes a significant decrease in her energy for months and tells me that little tasks through out the day such as cleaning or doing dishes would exhaust her, resting after minor household chores. She has been unable to cook dinner at night due to exhaustion. Her  noted this change in her as well. Her appetite has decreased recently and she has experienced unintentional weight loss of approximately 20 pounds since December. CT abdomen/pelvis completed in the ED revealed a 2.5cm pancreatic body lesion that involved the splenic artery and celiac artery for which we are consulted. Dr. iRco Maya with GI performed an EUS 5/9/17 with preliminary path malignant. Dr. Yakelin Lynn with Surgery has been consulted. Ms. Lc Burroughs denies any personal history of cancer. Significant family history which includes her sister who passed away from colon cancer, her father who passed away from renal cancer, her older brother with skin cancer, and another brother with cancer of unknown etiology. Notes that her niece was recently diagnosed with ovarian cancer. She states she has had all routine cancer screenings such as mammograms/colonoscopies and these have been normal. She worked as an  and  over the years. Denies any significant exposure to occupational hazards/chemicals. Does note that she grew up [place] a farm where pesticides were sprayed frequently. She tells me that she drank and smoked cigarettes while in college but quit at that time. Currently lives in [place] with her . INTERVAL HISTORY: Feeling good this morning. Tells me she slept well overnight. Appetite is good. She has gained a few pounds. Denies any nausea, vomiting, diarrhea, constipation. Ambulating around the room.  Pain is well controlled on IV Dilaudid    Past Medical History:   Diagnosis Date    Allergic cough 10/5/2010    AR (allergic rhinitis)     cough    GERD (gastroesophageal reflux disease)     Tx FOR \"REALLY BAD HEARTBURN\" IN 2013    Nausea & vomiting     PMS (premenstrual syndrome) 12/26/14    IN PAST, WAS SEVERE; MENOPAUSE 10 YRS AGO, PT SAYS    Postmenopause, LMP 51yo, NO HRT 10/5/2010    S/P normal colonoscopy 2004 10/5/2010    Unspecified sleep apnea 12/26/14    USES CPAP       Past Surgical History:   Procedure Laterality Date    DEXA BONE DENSITY STUDY AXIAL  2/2004    Normal; Dr Kate Mcdermott (prev Gyn)   [address] [address]    benign AUB; negative    ENDOSCOPY, COLON, DIAGNOSTIC  12/26/14    LAST COLONOSCOPY JAN 2014; PRIOR SCOPE 2004    HX RHINOPLASTY  1986    w/ chin implant (mentoplasty)    HX WISDOM TEETH EXTRACTION      [place] OUTER EAR SURGERY PROC UNLISTED  1981    excision benign tumor behind right ear       No Known Allergies    Current Facility-Administered Medications   Medication Dose Route Frequency Provider Last Rate Last Dose    sodium chloride (NS) flush 5-10 mL  5-10 mL IntraVENous Q8H Diana Lopez MD   10 mL at 05/09/17 2123    sodium chloride (NS) flush 5-10 mL  5-10 mL IntraVENous PRN Diana Lopez MD   10 mL at 05/11/17 0851    ondansetron (ZOFRAN) injection 4 mg  4 mg IntraVENous Q6H PRN Diana Lopez MD   4 mg at 05/10/17 0621    HYDROmorphone (PF) (DILAUDID) injection 1 mg  1 mg IntraVENous Q4H PRN Diana Lopez MD   1 mg at 05/11/17 0918  LORazepam (ATIVAN) injection 0.5-1 mg  0.5-1 mg IntraVENous Q6H PRN Noble Arabia, NP        pantoprazole (PROTONIX) 40 mg in sodium chloride 0.9 % 10 mL injection  40 mg IntraVENous DAILY Whitney PerezSIMI   40 mg at 05/11/17 1213       Social History     Social History    Marital status:      Spouse name: N/A    Number of children: 0    Years of education: N/A     Occupational History    Homemaker; 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 [address] Alcohol use No      Comment: very very rare    Drug use: No    Sexual activity: Not Currently     Other Topics Concern    Occupational Exposure No     down to 1 cup of cooffee qam; was up to 3-4 cups up unitl ~ the past few weeks    Weight Concern Yes     happy she has lost 20 lbs since strict diet and exercise    Special Diet Yes     since beginning of August went on strict diet of carb cuonting, low fat, protein and calorie counts; well balanced    Exercise Yes     swims a lot and since August started walking about 2-5 x a week x 45 minutes     Social History Narrative       Family History   Problem Relation Age of Onset    Stroke Mother     Heart Disease Mother     Heart Disease Father     Arthritis-rheumatoid Sister     Cancer Brother      skin cancer    HIV/AIDS Brother     Stroke Brother 79     TIA's    Heart Disease Brother 79     CABG    Stroke Maternal Grandmother     Heart Attack Maternal Grandfather     Heart Attack Paternal Grandmother     Heart Attack Paternal Grandfather     Colon Cancer Sister     Anesth Problems Neg Hx        ROS  As per the HPI, otherwise a comprehensive ROS is negative. ECOG PS is 1.   Emotional well being addressed and patient is coping appropriately to new cancer diagnosis. Physical Examination:   Visit Vitals    BP (!) 161/92 (BP 1 Location: Right arm, BP Patient Position: Sitting)    Pulse 70    Temp 98.6 °F (37 °C)    Resp 16    Ht 5' 7\" (1.702 m)    Wt 194 lb 9.6 oz (88.3 kg)    LMP 10/01/2005 (Approximate)    SpO2 96%    BMI 30.48 kg/m2     General appearance - alert, pleasant, no distress  Mental status - oriented to person, place, and time  Mouth - mucous membranes moist, pharynx normal without lesions  Neck - supple, no significant adenopathy  Chest - clear to auscultation, no wheezes, rales or rhonchi, symmetric air entry  Heart - normal rate, regular rhythm, normal S1, S2, no murmurs, rubs, clicks or gallops  Abdomen - soft, non-tender to light palpation, +bowel sounds  Neurological - normal speech, no focal findings or movement disorder noted  Extremities - peripheral pulses normal, no pedal edema  Skin - normal coloration and turgor, no rashes, no suspicious skin lesions noted    LABS  Lab Results   Component Value Date/Time    WBC 5.6 05/09/2017 03:22 AM    HGB 13.2 05/09/2017 03:22 AM    HCT 39.0 05/09/2017 03:22 AM    PLATELET 635 74/57/9051 03:22 AM    MCV 90.1 05/09/2017 03:22 AM    ABS. NEUTROPHILS 3.0 05/09/2017 03:22 AM     Lab Results   Component Value Date/Time    Sodium 140 05/09/2017 03:22 AM    Potassium 3.6 05/09/2017 03:22 AM    Chloride 106 05/09/2017 03:22 AM    CO2 27 05/09/2017 03:22 AM    Glucose 106 05/09/2017 03:22 AM    BUN 8 05/09/2017 03:22 AM    Creatinine 0.55 05/09/2017 03:22 AM    GFR est AA >60 05/09/2017 03:22 AM    GFR est non-AA >60 05/09/2017 03:22 AM    Calcium 8.6 05/09/2017 03:22 AM     Lab Results   Component Value Date/Time    AST (SGOT) 9 05/09/2017 03:22 AM    Alk.  phosphatase 77 05/09/2017 03:22 AM    Protein, total 6.1 05/09/2017 03:22 AM    Albumin 3.6 05/09/2017 03:22 AM    Globulin 2.5 05/09/2017 03:22 AM    A-G Ratio 1.4 05/09/2017 03:22 AM       PATHOLOGY  5/9/17 FNA: Predominantly blood and mature lymphocytes with rare clusters of benign gastric epithelial cells. IMAGING    CT Chest 5/11/17  IMPRESSION:   1. A 4 mm lung nodule in the left upper lobe is nonspecific. Follow-up chest CT  is recommended in 3 months. 2. A pancreatic mass is again demonstrated as seen on recent abdomen CT. CT 5/8/17  IMPRESSION:  1. No acute process on CT. 2. 2.5 cm pancreatic body lesion may represent malignant neoplasm and involves  the splenic artery and celiac artery. Consider nonemergent endoscopic ultrasound  and biopsy versus MRI abdomen. ASSESSMENT  Ms. Nancy Hathaway is a 60 y.o. female with newly diagnosed pancreatic mass on CT imaging 5/8/17. Preliminary pathology is malignant. T3N0 (celiac involvement) and currently unresectable. Questionable 4 mm left upper lobe nodule and no clear evidence of metastasis. She presented with pancreatitis and abdominal pain. Pancreatitis has subsided, pain is controlled on IV opioids. DISCUSSION/PLAN  1. Pancreatic mass, newly diagnosed. Stage III unresectable. Was reviewed in Tumor Board today. Dr. Trung Galaviz with Surgery is on board. Recommendations from Tumor board reviewed.    Discussed starting with neoadjuvant chemotherapy with or without radiation (added after a few months)  Neoadjuvant chemotherapy may   Improve the selection of patients for whom resection will not offer a survival benefit , Increase rates of margin-negative resections, treat micrometastatic disease.     A systematic review of data from 13 studies of FOLFIRINOX for locally advanced pancreatic cancer (315 patients, most derived from retrospective analysis) concluded that the proportion of patients undergoing surgical resection ranged from 0 to 43 percent (pooled proportion 26 percent), and of those patients undergoing resection, a complete (R0) resection was reported in 74 percent .     Side effects were reviewed that include but are not lilited to nausea, emesis, fatigue, sores, diarrhea, low counts, infections, myocardial infarction, neuropathy, liver damage. Patient is concerned about her quality of life on chemotherapy. FOLFIRINOX may be difficult to tolerate but offers the best possibility of an R0-R1 resection. Reassured, that at anytime she could stop or switch chemotherapy if she in intolerant. They have agreed to a port placement tomorrow (ordered)    2. Abdominal pain. Induced by solids, lipase improved and likely secondary to the mass. Will switch to a trial of po opioids. If pain is controlled on PO opioids may be discharged with a follow up with Dr. Kamala Al. 3. Sleep apnea. Wears CPAP at home. Family to bring hers into the hospital.    4. Fatigue. Related to underlying malignancy. This is stable. 5. Nausea. Significant improved. GI is on board. PRN zofran as needed. Appreciate consultation and care of Ms. Sarah Beth Thompson. Please call with any questions. Seen in conjunction with Mike Wall NP. Neoadjuvant chemotherapy discussed in detail. POrt to be placed tomorrow. Will attempt trial of oral opioids. Covering for Dr. Kamala Al.  Spent  > 60 mins today, > 50% spent in counseling and care co ordination    Signed by: Chris Merino MD                     May 11, 2017

## 2017-05-11 NOTE — PROGRESS NOTES
Hospitalist Progress Note  Office: [phone]      Date of Service:  2017  NAME:  Lilibeth Cochran  :  1955  MRN:  [id number]      Admission Summary:   59 yo woman with GERD, EMERY on CPAP, chronic generalized pain presented from home on 17 with abdominal pain, nausea, vomiting, and diarrhea. CT abd/pelvis in the ED revealed pancreatic mass. She was admitted to the medical floor with newly discovered pancreatic mass. Interval history / Subjective:   No complaints.  in the room. Assessment & Plan:     Pancreatic mass,patholopgy +for cancer, unresectable (POA)  - seen on CT abd/pelvis and concerning for malignancy  - unintentional weight loss of about 20 pounds in the last year  -Non operable. -To start chemotherapy,port placement and discharge tomorrow. Acute pancreatitis  -Lipase peaked>3000,normalized  -Pain much better  - Advance diet    Chronic pain syndrome   - pain control, would benefit from outpatient pain management referral    Diarrhea (PTA)  - resolved, last BM was 5/6    Unintentional weight loss  - concerning for underlying malignancy with pancreatic mass    EMERY on CPAP -using own CPAP in the hospital.  Code status: Full  DVT prophylaxis: SCDs    Care Plan discussed with: Patient/Family and Nurse  Disposition: expected to be discharged tomorrow after placement of [facility] Problems  Date Reviewed: 2017          Codes Class Noted POA    Generalized abdominal pain ICD-10-CM: R10.84  ICD-9-CM: 789.07  2017 Unknown        * (Principal)Pancreatic mass ICD-10-CM: K86.9  ICD-9-CM: 577.9  2017 Unknown            Review of Systems:   Pertinent items are noted in HPI. Vital Signs:    Last 24hrs VS reviewed since prior progress note.  Most recent are:  Visit Vitals    /87 (BP 1 Location: Right arm, BP Patient Position: At rest)    Pulse 68    Temp 98.2 °F (36.8 °C)    Resp 17    Ht 5' 7\" (1.702 m)    Wt 88.3 kg (194 lb 9.6 oz)    SpO2 99%    BMI 30.48 kg/m2       Intake/Output Summary (Last 24 hours) at 05/11/17 1723  Last data filed at 05/11/17 5411   Gross per 24 hour   Intake             1305 ml   Output             3350 ml   Net            -2045 ml      Physical Examination:     Constitutional:  awake, no acute distress, cooperative, pleasant    ENT:  oral mucosa moist, oropharynx benign  Neck supple   Resp:  CTA bilaterally, no wheezing/rhonchi/rales   CV:  regular rhythm, normal rate, no m/r/g appreciated, no edema, +pulses    GI:  +BS, soft, non distended, mild LUQ TTP     Musculoskeletal:  moves all extremities    Neurologic:  AAOx3, NFD     Skin:  warm, dry  Eyes:  PERRL    Data Review:    Review and/or order of clinical lab test  Review and/or order of tests in the radiology section of CPT  Review and/or order of tests in the medicine section of CPT    Labs:     Recent Labs      05/09/17   0322   WBC  5.6   HGB  13.2   HCT  39.0   PLT  143*     Recent Labs      05/09/17   0322   NA  140   K  3.6   CL  106   CO2  27   BUN  8   CREA  0.55   GLU  106*   CA  8.6     Recent Labs      05/10/17   0317  05/09/17   0322   SGOT   --   9*   ALT   --   24   AP   --   77   TBILI   --   1.4*   TP   --   6.1*   ALB   --   3.6   GLOB   --   2.5   LPSE  171  1400*     No results for input(s): INR, PTP, APTT in the last 72 hours. No lab exists for component: INREXT, INREXT   No results for input(s): FE, TIBC, PSAT, FERR in the last 72 hours. No results found for: FOL, RBCF   No results for input(s): PH, PCO2, PO2 in the last 72 hours. No results for input(s): CPK, CKNDX, TROIQ in the last 72 hours.     No lab exists for component: CPKMB  Lab Results   Component Value Date/Time    Cholesterol, total 209 09/13/2012 12:11 PM    HDL Cholesterol 57 09/13/2012 12:11 PM    LDL, calculated 125 09/13/2012 12:11 PM    Triglyceride 134 09/13/2012 12:11 PM    CHOL/HDL Ratio 3.5 10/05/2010 09:35 AM     No results found for: [facility]  Lab Results   Component Value Date/Time    Color DARK YELLOW 10/07/2010 02:35 PM    Appearance CLEAR 10/07/2010 02:35 PM    Specific gravity 1.006 10/07/2010 02:35 PM    pH (UA) 7.0 10/07/2010 02:35 PM    Protein NEGATIVE  10/07/2010 02:35 PM    Glucose NEGATIVE  10/07/2010 02:35 PM    Ketone NEGATIVE  10/07/2010 02:35 PM    Bilirubin NEGATIVE  10/07/2010 02:35 PM    Urobilinogen 0.2 10/07/2010 02:35 PM    Nitrites NEGATIVE  10/07/2010 02:35 PM    Leukocyte Esterase NEGATIVE  10/07/2010 02:35 PM    Epithelial cells 0-5 10/07/2010 02:35 PM    Bacteria NEGATIVE  10/07/2010 02:35 PM    WBC 0-4 10/07/2010 02:35 PM    RBC 0-3 10/07/2010 02:35 PM     Medications Reviewed:     Current Facility-Administered Medications   Medication Dose Route Frequency    HYDROmorphone (DILAUDID) tablet 2 mg  2 mg Oral Q4H PRN    sodium chloride (NS) flush 5-10 mL  5-10 mL IntraVENous Q8H    sodium chloride (NS) flush 5-10 mL  5-10 mL IntraVENous PRN    ondansetron (ZOFRAN) injection 4 mg  4 mg IntraVENous Q6H PRN    LORazepam (ATIVAN) injection 0.5-1 mg  0.5-1 mg IntraVENous Q6H PRN    pantoprazole (PROTONIX) 40 mg in sodium chloride 0.9 % 10 mL injection  40 mg IntraVENous DAILY     ______________________________________________________________________  EXPECTED LENGTH OF STAY: 5d 0h  ACTUAL LENGTH OF STAY:          3                 Renato Silva MD

## 2017-05-11 NOTE — PROGRESS NOTES
Bedside shift change report given to Daniela Andres (oncoming nurse) by Rebecca Whitman (offgoing nurse). Report included the following information SBAR and Kardex. Pain is not being well controlled by PO 2 mg dilaudid. Had to call for one time dose of 1 mg IV dilaudid twice last night. She received the one time dose at 2221 and 621. Will relay to daytime nurse that before she gets discharged she will need to be able to have a pain medication that can control her pain at home.

## 2017-05-11 NOTE — PROGRESS NOTES
NUTRITION COMPLETE ASSESSMENT    RECOMMENDATIONS:   1. Advance diet as tolerated to Regular (slow progression with food diary prn-- pt and  aware)    2. Resume daily MVI and Vitamin D secondary to pancreatic involvement and risks of malabsorption  -- Consider checking Vit D (25OH) to have a baseline    3. Weekly weights (standing scale)    4. Encourage outpatient RD follow up in Med Oncology office prn if symptoms or po intolerance worsens. 5. Would not recommend resumption of herbals until discussed with Oncologist until potential risks of drug interactions are reviewed. Interventions/Plan:   Food/Nutrient Delivery: Low Fat diet and advance as tolerated    Assessment:   Reason for Assessment:   [x]BPA/MST Referral     Diet:  GI Lite  Nutritionally Significant Medications: [x] Reviewed & Includes: protonix daily; zofran q 6 hours prn   Meal Intake: Patient Vitals for the past 100 hrs:   % Diet Eaten   05/11/17 0921 100 %     Subjective:  Pt with  at the bedside. Both are very pleasant. Pt reports her symptoms have been getting worse overtime and she was suspecting that something else was wrong (intially told symptoms were related to gastritis). She was having post-prandial pain and this was a barrier to eating (fear). She is now eating and feeling much better. Plan is for her case to be discussed in [facility] today, which she is pleased about. Objective:  Chart reviewed, discussed with RN, MD and team during interdisciplinary rounds. Pt admitted with pancreatitis. PMHx: obesity, chronic pain, sleep apnea, GERD, others noted. Pt admits to being a chronic dieter, but while she was trying to lose weight, she realized something was wrong when she wasn't making an effort and still losing weight everyday. States she noticed foods were giving her either diarrhea or pain after eating, so her appetite declined. Pt with 20# weight loss x 5 months, which is significant (8%). Will monitor trends. Pancreatitis/lipase has improved. Advised to reintroduce higher fat foods slowly to assess pain and tolerance and provided handout.  with history of Gastric Bypass, so he is aware of the fat content of foods and label reading as well. Pt taking multiple vitamins/minerals/herbals PTA. Advised to discuss with her oncologist and outpatient RD once plan of care and chemo/surgical plan is determined. Vit/Min/Herbal List:  Women's MVI; CoQ 10; Calcium with Vitamin D; B12 1000 mcg; Ferrous Sulfate 325 mg; Glucosamine; K2 (?); Milk Thistle; Butter Joan; Apple Cider Vinegar; Vitamin E 400 units; Magnesium. Estimated Nutrition Needs:   Kcals/day: 1989 Kcals/day (7198-8722 kcal/day (HBE x 1.3-1.5))  Protein: 106 g (1.2 g/kg)  Fluid: 2000 ml (~1 mL/kcal)     Based On: [place]  Weight Used: Actual wt (88.3 kg)    Pt expected to meet estimated nutrient needs:  [x]   Yes (if appetite remains intact)    Nutrition Diagnosis:   1.  Inadequate protein-energy intake related to increased energy expenditure and post-prandial pain as evidenced by 20# weight loss x 5 months and decreased appetite    Goals:     Pt to consume at least 75% of meals over the next 5-7 days     Monitoring & Evaluation:    - Total energy intake   - Weight/weight change     Previous Nutrition Goals Met:  N/A  Previous Recommendations:      N/A    Education & Discharge Needs:   [] None Identified   [x] Identified and addressed: slow reintroduction of high fat foods after pancreatitis and monitoring for s/s of malabsorption (diarrhea and post-prandial pain)   [x] Participated in care plan, discharge planning, and/or interdisciplinary rounds        Cultural, Jew and ethnic food preferences identified:  NONE      Skin Integrity: [x]Intact  []Other  Edema: [x]None []Other  Last BM: 5/9/17  Food Allergies: [x]None []Other    Anthropometrics:    Weight Loss Metrics 5/11/2017 5/8/2017 12/31/2014 12/26/2014 1/22/2014 12/19/2012 11/30/2012   Today's Wt 194 lb 9.6 oz - 220 lb 220 lb 217 lb 226 lb 11.2 oz 229 lb 6.4 oz   BMI - 30.48 kg/m2 34.45 kg/m2 34.45 kg/m2 33 kg/m2 34.48 kg/m2 34.89 kg/m2      Last 3 Recorded Weights in this Encounter    05/08/17 0625 05/09/17 0437 05/11/17 0108   Weight: 86.9 kg (191 lb 9.6 oz) 87.1 kg (192 lb) 88.3 kg (194 lb 9.6 oz)      Weight Source: Standing scale (comment)  Height: 5' 7\" (170.2 cm),    Body mass index is 30.48 kg/(m^2). IBW : 61.2 kg (135 lb),    Usual Body Weight: 95.3 kg (210 lb),      Labs:  Lab Results   Component Value Date/Time    Sodium 140 05/09/2017 03:22 AM    Potassium 3.6 05/09/2017 03:22 AM    Chloride 106 05/09/2017 03:22 AM    CO2 27 05/09/2017 03:22 AM    Glucose 106 05/09/2017 03:22 AM    BUN 8 05/09/2017 03:22 AM    Creatinine 0.55 05/09/2017 03:22 AM    Calcium 8.6 05/09/2017 03:22 AM    Albumin 3.6 05/09/2017 03:22 AM     No results found for: HBA1C, HGBE8, OVR7DQCW, XJL4MLOD  Lab Results   Component Value Date/Time    Glucose 106 05/09/2017 03:22 AM      Lab Results   Component Value Date/Time    ALT (SGPT) 24 05/09/2017 03:22 AM    AST (SGOT) 9 05/09/2017 03:22 AM    Alk.  phosphatase 77 05/09/2017 03:22 AM    Bilirubin, total 1.4 05/09/2017 03:22 AM      [address]

## 2017-05-11 NOTE — PROGRESS NOTES
[address]  174 [facility], [address]       GI PROGRESS NOTE  Blaise Garcia, [facility]  [phone] office  [phone] NP in-hospital cell phone M-F until 4:30  After 5pm or on weekends, please call  for physician on call      NAME: Eli Blanco   :  1955   MRN:  [id number]       Subjective:     Pt doing great. Pain free. Informed pt about lymph node biopsy    Objective:     VITALS:   Last 24hrs VS reviewed since prior progress note. Most recent are:  Visit Vitals    BP (!) 161/92 (BP 1 Location: Right arm, BP Patient Position: Sitting)    Pulse 70    Temp 98.6 °F (37 °C)    Resp 16    Ht 5' 7\" (1.702 m)    Wt 88.3 kg (194 lb 9.6 oz)    SpO2 96%    BMI 30.48 kg/m2       PHYSICAL EXAM:  General: Cooperative, no acute distress    Neurologic:  Alert and oriented X 3. HEENT: PERRL, EOMI. Lungs:  CTA bilaterally. No wheezing  Heart:  S1 S2, regular rhythm, no murmur   Abdomen: Soft, non distended, non tender. +Bowel sounds  Extremities: No edema  Psych:   Good insight. Not anxious or agitated. Lab Data Reviewed:     No results found for this or any previous visit (from the past 24 hour(s)). 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 Patient Active Problem List   Diagnosis Code    Postmenopause, LMP 96VL, NO HRT Z78.0    S/P normal colonoscopy  Z98.890    Allergic rhinitis J30.9    Allergic cough R05    Obstructive sleep apnea (adult) (pediatric) G47.33    Generalized abdominal pain R10.84    Pancreatic mass K86.9     Plan:   · Plan per oncology and surgery  · Will sign off     Signed By: eFly Ulloa. Bernarda Bunch NP     2017  11:59 AM         I have examined the patient. I have reviewed the chart and agree with the documentation recorded by the NP, including the assessment, treatment plan, and disposition.         Zeke Dior MD

## 2017-05-11 NOTE — PROGRESS NOTES
Spiritual Care Assessment/Progress Notes    Ela Romero [id number]  [id number]    1955  60 y.o.  female    Patient Telephone Number: There is no home phone number on file. Orthodoxy Affiliation: Episcopal   Language: English   Extended Emergency Contact Information  Primary Emergency Contact: Nino LOPEZ  Address: [facility] [address] [place] Phone: [phone]  Relation: Spouse   Patient Active Problem List    Diagnosis Date Noted    Generalized abdominal pain 05/08/2017    Pancreatic mass 05/08/2017    Obstructive sleep apnea (adult) (pediatric) 10/01/2012    Postmenopause, LMP 53yo, NO HRT 10/05/2010    S/P normal colonoscopy 2004 10/05/2010    Allergic rhinitis 10/05/2010    Allergic cough 10/05/2010        Date: 5/11/2017       Level of Orthodoxy/Spiritual Activity:  []         Involved in konrad tradition/spiritual practice    []         Not involved in konrad tradition/spiritual practice  [x]         Spiritually oriented    []         Claims no spiritual orientation    []         seeking spiritual identity  []         Feels alienated from Methodist practice/tradition  []         Feels angry about Methodist practice/tradition  [x]         Spirituality/Methodist tradition IS a resource for coping at this time.   []         Not able to assess due to medical condition    Services Provided Today:  []         crisis intervention    []         reading Scriptures  [x]         spiritual assessment    []         prayer  [x]         empathic listening/emotional support  []         rites and rituals (cite in comments)  []         life review     []         Methodist support  []         theological development   []         advocacy  []         ethical dialog     []         blessing  []         bereavement support    [x]         support to family  []         anticipatory grief support   []         help with AMD  []         spiritual guidance    [] meditation      Spiritual Care Needs  [x]         Emotional Support  [x]         Spiritual/Quaker Care  []         Loss/Adjustment  []         Advocacy/Referral                /Ethics  []         No needs expressed at               this time  []         Other: (note in               comments)  [address]  []         Follow up visits with               pt/family  []         Provide materials  []         Schedule sacraments  []         Contact Community               Clergy  [x]         Follow up as needed  []         Other: (note in               comments)     Comments: Saw Mrs Brendan Sheth in room 620 upon referral of Spiritual Care Partner. Mrs Brendan Sheth was lying quietly in bed; she was smiling and appeared relaxed. Patient's  was present but did not engage in conversation. Provided compassionate presence as Mrs Brendan Sheth shared about what had been taking place in her life over the past year. She spoke about how frustrating it had been to have had so much pain and fatigue and to be told that nothing could be found wrong with her. She said that, while she wasn't pleased about her diagnosis, she was happy that the underlying problem had been identified. Provided active listening as Mrs Brendan Sheth shared about her spiritual journey and how her recent physical difficulties had brought her back into a better relationship with God. She stated that she was not afraid of death and felt that she had a win-win situation in that if she  she would go to heaven and if she lived that would also be good. Mrs Brendan Sheth said that she was not a member of any Jew or konrad community; stated she had been disappointed over the years in the various churches she had attended. Said she was just getting ready to look for a Jew when she became sick; said that she felt the problem was probably with her and not the churches she had attended--that her expectations had been too high.  Mrs Brendan Sheth said that she was a person of konrad and Scripture reading had been a major source of comfort for her. Our conversation was interrupted by a visit from her oncologist, for whom she and her  had been anxiously awaiting a visit. Assured patient of prayers on her behalf and of on-going  availability for support. Patient stated she would love to have chaplains visit when possible. Plan: Chaplains will continue to be available for patient/family support as needed/able. : Rev. Haven Colon.  Ling Napoles; [facility], to contact [phone] [address] call: [phone]

## 2017-05-11 NOTE — PROGRESS NOTES
Bedside and Verbal shift change report given to Calvin Washington RN (oncoming nurse) by Ary Thurman RN (offgoing nurse). Report included the following information SBAR, Kardex, MAR and Recent Results.

## 2017-05-11 NOTE — PROGRESS NOTES
Bedside shift change report given to Zoë Leggett RN (oncoming nurse) by Maximiliano Aguirre RN (offgoing nurse). Report included the following information SBAR and Kardex.

## 2017-05-12 NOTE — PROGRESS NOTES
Hospitalist Progress Note  Office: [phone]      Date of Service:  2017  NAME:  Lio Zendejas  :  1955  MRN:  [id number]      Admission Summary:   61 yo woman with GERD, EMERY on CPAP, chronic generalized pain presented from home on 17 with abdominal pain, nausea, vomiting, and diarrhea. CT abd/pelvis in the ED revealed pancreatic mass. She was admitted to the medical floor with newly discovered pancreatic mass. Interval history / Subjective:   Back from port placement. Since the iv dilaudid was discontinued she is experiencing increased pain and had to get Iv dilaudid. She is very afraid of the same pain she had for months is coming back and worried how she could handle this at home. She also told me she felt nauseated and a bit unsteady with the oral dilaudid which she did  Not experience with the IV.  present expressing the same concern. Increased po dilaudid and palliative care team consulted. Assessment & Plan:     Pancreatic mass,patholopgy +for cancer, unresectable (POA)  - seen on CT abd/pelvis and concerning for malignancy  - unintentional weight loss of about 20 pounds in the last year  -Non operable. -To start Neoadjuvant chemotherapy  -Port placed   -Pain worsened since off Iv dialudid. Had iv dose x1 this am for breakthrough. Increase po dilaudid.  -Pain management is going to a challenging issues for patient as she expresses cocern already,she has been suffering from severe pain for months. Palliative care team consulted,    Acute pancreatitis  -Lipase peaked>3000,normalized  -Pain much better  - Advance diet    Chronic pain syndrome   - pain control, would benefit from outpatient pain management referral  --Pain worsened since off Iv dialudid. Had iv dose x1 this am for breakthrough. Increase po dilaudid.  -Pain management is going to a challenging issues for patient as she expresses cocern already,she has been suffering from severe pain for months. Palliative care team consulted,    Diarrhea (PTA)  - resolved, last BM was 5/6    Unintentional weight loss. Body mass index is 30.54 kg/(m^2). -Due to cancer. Nutrition support. EMERY on CPAP -using own CPAP in the hospital.  Code status: Full  DVT prophylaxis: no surgery planned,has completed port placement, thus started on chemical prophylaxis with Lovenox. Care Plan discussed with: Patient/Family and Nurse  Disposition:  -Needed to work on pain control prior to discharge. Palliative care consulted. Hospital Problems  Date Reviewed: 5/8/2017          Codes Class Noted POA    Generalized abdominal pain ICD-10-CM: R10.84  ICD-9-CM: 789.07  5/8/2017 Unknown        * (Principal)Pancreatic mass ICD-10-CM: K86.9  ICD-9-CM: 577.9  5/8/2017 Unknown            Review of Systems:   Pertinent items are noted in HPI. Vital Signs:    Last 24hrs VS reviewed since prior progress note. Most recent are:  Visit Vitals    /71 (BP 1 Location: Right arm, BP Patient Position: At rest)    Pulse 67    Temp 97.9 °F (36.6 °C)    Resp 18    Ht 5' 7\" (1.702 m)    Wt 88.5 kg (195 lb)    SpO2 99%    BMI 30.54 kg/m2     No intake or output data in the 24 hours ending 05/12/17 1124   Physical Examination:     Constitutional:  awake, no acute distress, cooperative, pleasant    ENT:  oral mucosa moist, oropharynx benign  Neck supple  Right chest wall port insertion small wound clean, dry intact.    Resp:  CTA bilaterally, no wheezing/rhonchi/rales   CV:  regular rhythm, normal rate, no m/r/g appreciated, no edema, +pulses    GI:  +BS, soft, non distended, mild LUQ TTP     Musculoskeletal:  moves all extremities    Neurologic:  AAOx3, NFD     Skin:  warm, dry  Eyes:  PERRL    Data Review:    Review and/or order of clinical lab test  Review and/or order of tests in the radiology section of CPT  Review and/or order of tests in the medicine section of CPT    Labs:     No results for input(s): WBC, HGB, HCT, PLT, HGBEXT, HCTEXT, PLTEXT, HGBEXT, HCTEXT, PLTEXT in the last 72 hours. No results for input(s): NA, K, CL, CO2, BUN, CREA, GLU, CA, MG, PHOS, URICA in the last 72 hours. Recent Labs      05/10/17   0317   LPSE  171     No results for input(s): INR, PTP, APTT in the last 72 hours. No lab exists for component: INREXT, INREXT   No results for input(s): FE, TIBC, PSAT, FERR in the last 72 hours. No results found for: FOL, RBCF   No results for input(s): PH, PCO2, PO2 in the last 72 hours. No results for input(s): CPK, CKNDX, TROIQ in the last 72 hours.     No lab exists for component: CPKMB  Lab Results   Component Value Date/Time    Cholesterol, total 209 09/13/2012 12:11 PM    HDL Cholesterol 57 09/13/2012 12:11 PM    LDL, calculated 125 09/13/2012 12:11 PM    Triglyceride 134 09/13/2012 12:11 PM    CHOL/HDL Ratio 3.5 10/05/2010 09:35 AM     No results found for: GLUCPOC  Lab Results   Component Value Date/Time    Color DARK YELLOW 10/07/2010 02:35 PM    Appearance CLEAR 10/07/2010 02:35 PM    Specific gravity 1.006 10/07/2010 02:35 PM    pH (UA) 7.0 10/07/2010 02:35 PM    Protein NEGATIVE  10/07/2010 02:35 PM    Glucose NEGATIVE  10/07/2010 02:35 PM    Ketone NEGATIVE  10/07/2010 02:35 PM    Bilirubin NEGATIVE  10/07/2010 02:35 PM    Urobilinogen 0.2 10/07/2010 02:35 PM    Nitrites NEGATIVE  10/07/2010 02:35 PM    Leukocyte Esterase NEGATIVE  10/07/2010 02:35 PM    Epithelial cells 0-5 10/07/2010 02:35 PM    Bacteria NEGATIVE  10/07/2010 02:35 PM    WBC 0-4 10/07/2010 02:35 PM    RBC 0-3 10/07/2010 02:35 PM     Medications Reviewed:     Current Facility-Administered Medications   Medication Dose Route Frequency    sodium chloride (NS) flush 10 mL  10 mL InterCATHeter PRN    HYDROmorphone (DILAUDID) tablet 4 mg  4 mg Oral Q4H PRN    sodium chloride (NS) flush 5-10 mL  5-10 mL IntraVENous Q8H    sodium chloride (NS) flush 5-10 mL  5-10 mL IntraVENous PRN    ondansetron (ZOFRAN) injection 4 mg  4 mg IntraVENous Q6H PRN    LORazepam (ATIVAN) injection 0.5-1 mg  0.5-1 mg IntraVENous Q6H PRN    pantoprazole (PROTONIX) 40 mg in sodium chloride 0.9 % 10 mL injection  40 mg IntraVENous DAILY     ______________________________________________________________________  EXPECTED LENGTH OF STAY: 5d 0h  ACTUAL LENGTH OF STAY:          4                 Mortimer Many, MD

## 2017-05-12 NOTE — PROGRESS NOTES
Patient reviewed in rounds. There are no CM consults or needs at this time. Patient's spouse to transport patient home at time of discharge. CM will continue to follow and assist as disposition needs arise.     FLORENTINO Gar/JEANINE  2:06 PM

## 2017-05-12 NOTE — CONSULTS
Palliative Medicine Consult  Bart: [phone] ([phone]    Patient Name: Ela Romero  YOB: 1955    Date of Initial Consult: May 12, 2017  Reason for Consult: Pain Management  Requesting Provider:  Dr. Hussein Funes  Primary Care Physician: Rubia Mccallum DO      SUMMARY:   Ela Romero is a 60 y.o. with a past history of sleep apnea, gastric ulcer, GERD, allergic rhinitis, who was admitted on 5/8/2017 from home with generalized abdominal pain, diarrhea, chills, insomnia, constant lower back pain, cold intolerance,  Malaise, decreased appetite. CT of the abdomen and pelvis in the ED revealed a pancreatic mass highly suspicious for malignancy. Pt has had vague s/s for which she was seeing her PCP. All of her tests were normal.  She was seen by physical therapy for presumed myofacial pain. She was using topical agents for pain and hydrocodone. She noticed she had loss weight and had appetite changes. Current medical issues leading to Palliative Medicine involvement include: Pain Management. Psychosocial:  36 years, no children, last job was with [employer], 6 siblings, born in [place], earned a degree in social work from the [employer], [facility] a [facility] then back home to [place] then [place] with her spouse, moved to [place] because of her 's job, no family in [place], pt has a personal relationship with God, loves reading, biking, pool, documentaries, and history     PALLIATIVE DIAGNOSES:   1. Acute abdominal pain  2. Unintentional weight loss  3. Decreased appetite  4. Opioid induced constipation   5. Pancreatic Mass       PLAN:   1. Pt is appropriate for outpatient follow up in our clinic for which I will send a referral  2. Start ms contin 15mg bid and oxy IR 15mg q 3 prn  3. pericolace 1 cap hs  4. Continue zofran for nausea  5. Titrate opioids based on clinical response  6.  If her s/s fail to respond to opioids, consider a celiac plexus block which can be done outpatient  7. Follow up in our outpatient clinic next week post discharge. Someone from our office will contact the patient  8. Initial consult note routed to primary continuity provider  9. Communicated plan of care with: Palliative IDT       GOALS OF CARE / TREATMENT PREFERENCES:   [====Goals of Care====]  GOALS OF CARE:  Patient / health care proxy stated goals: pain control and chemo      TREATMENT PREFERENCES:   Code Status: Full Code    Advance Care Planning:  Advance Care Planning 5/8/2017   Patient's Healthcare Decision Maker is: Named in scanned ACP document   Primary Decision Maker Name Byron Blum   Primary Decision Maker Phone Number [phone]   Primary Decision Maker Relationship to Patient Spouse   Secondary Decision Maker [facility]   Secondary Decision Maker Relationship to Patient Sibling   Confirm Advance Directive Yes, on file       Other:    The palliative care team has discussed with patient / health care proxy about goals of care / treatment preferences for patient.  [====Goals of Care====]         HISTORY:     History obtained from: chart    CHIEF COMPLAINT: pain not controlled well    HPI/SUBJECTIVE:    The patient is:   [x] Verbal and participatory  [] Non-participatory due to:   Pain not responding well to oral dilaudid    Clinical Pain Assessment (nonverbal scale for severity on nonverbal patients):   [++++ Clinical Pain Assessment++++]  [++++Pain Severity++++]: Pain: 5  [++++Pain Character++++]:  Hurts to wear a bra but its not skin pain  [++++Pain Duration++++]: on and off for months  [++++Pain Effect++++]: decreased appetite, malaise  [++++Pain Factors++++]: certain foods exacerbate s/s  [++++Pain Frequency++++]: all the time  [++++Pain Location++++]: lower back across abdomen and underneath both breasts  [++++ Clinical Pain Assessment++++]     FUNCTIONAL ASSESSMENT:     Palliative Performance Scale (PPS):  PPS: 70       PSYCHOSOCIAL/SPIRITUAL SCREENING:     Advance Care Planning:  Advance Care Planning 5/8/2017   Patient's Healthcare Decision Maker is: Named in scanned ACP document   Primary Decision Maker Name Sofiya Davis   Primary Decision Maker Phone Number [phone]   Primary Decision Maker Relationship to Patient Spouse   Secondary Decision Maker Name [facility]   Secondary Decision Maker Relationship to Patient Sibling   Confirm Advance Directive Yes, on file        Any spiritual / Evangelical concerns:  [] Yes /  [x] No    Caregiver Burnout:  [] Yes /  [x] No /  [] No Caregiver Present      Anticipatory grief assessment:   [x] Normal  / [] Maladaptive       ESAS Anxiety: Anxiety: 0    ESAS Depression: Depression: 0        REVIEW OF SYSTEMS:     Positive and pertinent negative findings in ROS are noted above in HPI. The following systems were [x] reviewed / [] unable to be reviewed as noted in HPI  Other findings are noted below. Systems: constitutional, ears/nose/mouth/throat, respiratory, gastrointestinal, genitourinary, musculoskeletal, integumentary, neurologic, psychiatric, endocrine. Positive findings noted below. Modified ESAS Completed by: provider   Fatigue: 0 Drowsiness: 0   Depression: 0 Pain: 5   Anxiety: 0 Nausea: 0   Anorexia: 0 Dyspnea: 0     Constipation: No     Stool Occurrence(s): 1        PHYSICAL EXAM:     From RN flowsheet:  Wt Readings from Last 3 Encounters:   05/12/17 195 lb (88.5 kg)   12/31/14 220 lb (99.8 kg)   12/26/14 220 lb (99.8 kg)     Blood pressure 150/75, pulse 63, temperature 98.3 °F (36.8 °C), resp. rate 16, height 5' 7\" (1.702 m), weight 195 lb (88.5 kg), last menstrual period 10/01/2005, SpO2 99 %.     Pain Scale 1: Numeric (0 - 10)  Pain Intensity 1: 4  Pain Onset 1: chronic  Pain Location 1: Abdomen, Back  Pain Orientation 1: Lower  Pain Description 1: Aching  Pain Intervention(s) 1: Medication (see MAR)  Last bowel movement, if known:     Constitutional: pleasant, conversant, nad  Eyes: pupils equal, anicteric  ENMT: no nasal discharge, moist mucous membranes  Cardiovascular: regular rhythm, distal pulses intact  Respiratory: breathing not labored, symmetric  Gastrointestinal: soft non-tender, +bowel sounds  Musculoskeletal: no deformity, no tenderness to palpation  Skin: warm, dry  Neurologic: following commands, moving all extremities  Psychiatric: full affect, no hallucinations  Other:       HISTORY:     Principal Problem:    Pancreatic mass (5/8/2017)    Active Problems:    Generalized abdominal pain (5/8/2017)      Past Medical History:   Diagnosis Date    Allergic cough 10/5/2010    AR (allergic rhinitis)     cough    GERD (gastroesophageal reflux disease)     Tx FOR \"REALLY BAD HEARTBURN\" IN 2013    Nausea & vomiting     PMS (premenstrual syndrome) 12/26/14    IN PAST, WAS SEVERE; MENOPAUSE 10 YRS AGO, PT SAYS    Postmenopause, LMP 49yo, NO HRT 10/5/2010    S/P normal colonoscopy 2004 10/5/2010    Unspecified sleep apnea 12/26/14    USES CPAP      Past Surgical History:   Procedure Laterality Date    DEXA BONE DENSITY STUDY AXIAL  2/2004    Normal; Dr Fredy Loyola (prev Gyn)    DILATION AND CURETTAGE  32yo    benign AUB; negative    ENDOSCOPY, COLON, DIAGNOSTIC  12/26/14    LAST COLONOSCOPY JAN 2014; PRIOR SCOPE 2004    HX RHINOPLASTY  1986    w/ chin implant (mentoplasty)    HX WISDOM TEETH EXTRACTION      ME OUTER EAR SURGERY PROC UNLISTED  1981    excision benign tumor behind right ear      Family History   Problem Relation Age of Onset    Stroke Mother     Heart Disease Mother     Heart Disease Father     Arthritis-rheumatoid Sister     Cancer Brother      skin cancer    HIV/AIDS Brother     Stroke Brother 79     TIA's    Heart Disease Brother 79     CABG    Stroke Maternal Grandmother     Heart Attack Maternal Grandfather     Heart Attack Paternal Grandmother     Heart Attack Paternal Grandfather     Colon Cancer Sister     Anesth Problems Neg Hx       History reviewed, no pertinent family history.   Social History   Substance Use Topics    Smoking status: Former Smoker     Years: 6.00     Quit date: 9/27/1980    Smokeless tobacco: Never Used      Comment: used to smoke about 2 packs a week from college 1974 through [address] Alcohol use No      Comment: very very rare     No Known Allergies   Current Facility-Administered Medications   Medication Dose Route Frequency    sodium chloride (NS) flush 10 mL  10 mL InterCATHeter PRN    HYDROmorphone (DILAUDID) tablet 4 mg  4 mg Oral Q4H PRN    enoxaparin (LOVENOX) injection 40 mg  40 mg SubCUTAneous Q24H    morphine CR (MS CONTIN) tablet 15 mg  15 mg Oral Q12H    oxyCODONE IR (ROXICODONE) tablet 15 mg  15 mg Oral Q3H PRN    senna-docusate (PERICOLACE) 8.6-50 mg per tablet 1 Tab  1 Tab Oral QHS    acetaminophen (TYLENOL) tablet 650 mg  650 mg Oral Q6H PRN    sodium chloride (NS) flush 5-10 mL  5-10 mL IntraVENous Q8H    sodium chloride (NS) flush 5-10 mL  5-10 mL IntraVENous PRN    ondansetron (ZOFRAN) injection 4 mg  4 mg IntraVENous Q6H PRN    LORazepam (ATIVAN) injection 0.5-1 mg  0.5-1 mg IntraVENous Q6H PRN    pantoprazole (PROTONIX) 40 mg in sodium chloride 0.9 % 10 mL injection  40 mg IntraVENous DAILY          LAB AND IMAGING FINDINGS:     Lab Results   Component Value Date/Time    WBC 5.6 05/09/2017 03:22 AM    HGB 13.2 05/09/2017 03:22 AM    PLATELET 635 12/47/5775 03:22 AM     Lab Results   Component Value Date/Time    Sodium 140 05/09/2017 03:22 AM    Potassium 3.6 05/09/2017 03:22 AM    Chloride 106 05/09/2017 03:22 AM    CO2 27 05/09/2017 03:22 AM    BUN 8 05/09/2017 03:22 AM    Creatinine 0.55 05/09/2017 03:22 AM    Calcium 8.6 05/09/2017 03:22 AM      Lab Results   Component Value Date/Time    AST (SGOT) 9 05/09/2017 03:22 AM    Alk.  phosphatase 77 05/09/2017 03:22 AM    Protein, total 6.1 05/09/2017 03:22 AM    Albumin 3.6 05/09/2017 03:22 AM    Globulin 2.5 05/09/2017 03:22 AM     No results found for: INR, PTMR, PTP, PT1, PT2, APTT   No results found for: IRON, FE, TIBC, IBCT, PSAT, FERR   No results found for: PH, PCO2, PO2  No components found for: GLPOC   No results found for: CPK, CKMB             Total time: 70 minutes  Counseling / coordination time, spent as noted above: 50 minutes  > 50% counseling / coordination?: y    Prolonged service was provided for  []30 min   []75 min in face to face time in the presence of the patient, spent as noted above. Time Start:   Time End:   Note: this can only be billed with 80488 (initial) or 24571 (follow up). If multiple start / stop times, list each separately.

## 2017-05-12 NOTE — CDMP QUERY
Please clarify if this patient is being treated/managed for:    =>Severe Protein Calorie Malnutrition in the setting of pancreatic CA requiring nutritional support  =>Other Explanation of clinical findings  =>Unable to Determine (no explanation of clinical findings)    The medical record reflects the following clinical findings, treatment, and risk factors:    Risk Factors: Pancreatic CA  Clinical Indicators: nutritional consult-  Pt with 20# weight loss x 5 months, which is significant (8%). Will monitor trends  Inadequate protein-energy intake related to increased energy expenditure and post-prandial pain as evidenced by 20# weight loss x 5 months and decreased appetite  Meets Criteria for Chronic Malnutrition    [X ] Severe Malnutrition, as evidenced by:              [  ] Moderate muscle wasting, loss of subcutaneous fat              [X ] Nutritional intake of <75% of recommended intake for >1 month              [X ] Weight loss of  >5% in 1 month, >7.5% in 3 months, >10% in 6 months,               [  ] Severe edema   Treatment: nutritional consult, zofran, protonix    Please clarify and document your clinical opinion in the progress notes and discharge summary including the definitive and/or presumptive diagnosis, (suspected or probable), related to the above clinical findings. Please include clinical findings supporting your diagnosis.     Thank you,         Virginie Mcneil [facility]

## 2017-05-12 NOTE — PROGRESS NOTES
Bedside shift change report given to Erika Cruz RN (oncoming nurse) by Imelda Everett RN (offgoing nurse). Report included the following information SBAR and Kardex.

## 2017-05-12 NOTE — PROGRESS NOTES
Bedside shift change report given to [facility] (oncoming nurse) by Meli Go (offgoing nurse). Report included the following information SBAR and Kardex.

## 2017-05-12 NOTE — PROGRESS NOTES
Attempt to see Ms. Bennett this morning. She was off the floor at the time. Port placed today. Plan for follow up with Dr. Antonina Peterson outpatient next week to discuss initiation of chemotherapy. mFOLFIRINOX was discussed in detail yesterday. Pian needs to be controlled on an oral regimen. However, if pain uncontrolled then will need Celiac plexus block. In that case please consult Dr. Rico Maya  Please call with any questions.     Bambi Patel MD, [facility]

## 2017-05-13 NOTE — PROGRESS NOTES
Hematology/Oncology Progress Note    REASON FOR VISIT: Pancreatic Mass    HISTORY OF PRESENT ILLNESS: Ms. Nancy Hathaway is a 60 y.o. female who we are asked to see for new pancreatic mass. She presented to the ED on 5/8/17 with severe abdominal pain. She has been experiencing this for months now but states that location and intensity of abdominal pain can change and move at times from upper abdomen to lower abdomen. She has also been experiencing back/shoulder pain for which she had been trying to go to Physical Therapy to manage. This provided little relief for her. Medications over the counter had also provided little relief. She notes a significant decrease in her energy for months and tells me that little tasks through out the day such as cleaning or doing dishes would exhaust her, resting after minor household chores. She has been unable to cook dinner at night due to exhaustion. Her  noted this change in her as well. Her appetite has decreased recently and she has experienced unintentional weight loss of approximately 20 pounds since December. CT abdomen/pelvis completed in the ED revealed a 2.5cm pancreatic body lesion that involved the splenic artery and celiac artery for which we are consulted. Dr. Tom Villareal with GI performed an EUS 5/9/17 with preliminary path malignant. Dr. Trung Galaviz with Surgery has been consulted. Ms. Nancy Hathaway denies any personal history of cancer. Significant family history which includes her sister who passed away from colon cancer, her father who passed away from renal cancer, her older brother with skin cancer, and another brother with cancer of unknown etiology. Notes that her niece was recently diagnosed with ovarian cancer. She states she has had all routine cancer screenings such as mammograms/colonoscopies and these have been normal. She worked as an  and  over the years. Denies any significant exposure to occupational hazards/chemicals. Does note that [place] pesticides were sprayed frequently. She tells me that she drank and smoked cigarettes while in college but quit at that time. Currently lives in [place] with her . INTERVAL HISTORY:   Pain much better this afternoon, after receiving oxycodone this morning. She is in a great mood given this improvement. No nausea.     Past Medical History:   Diagnosis Date    Allergic cough 10/5/2010    AR (allergic rhinitis)     cough    GERD (gastroesophageal reflux disease)     Tx FOR \"REALLY BAD HEARTBURN\" IN 2013    Nausea & vomiting     PMS (premenstrual syndrome) 12/26/14    IN PAST, WAS SEVERE; MENOPAUSE 10 YRS AGO, PT SAYS    Postmenopause, LMP 51yo, NO HRT 10/5/2010    S/P normal colonoscopy 2004 10/5/2010    Unspecified sleep apnea 12/26/14    USES CPAP       Past Surgical History:   Procedure Laterality Date    DEXA BONE DENSITY STUDY AXIAL  2/2004    Normal; Dr Fortino Hand (prev Gyn)   [id number] [place] [address]  32yo    benign AUB; negative    ENDOSCOPY, COLON, DIAGNOSTIC  12/26/14    LAST COLONOSCOPY JAN 2014; PRIOR SCOPE 2004    HX RHINOPLASTY  1986    w/ chin implant (mentoplasty)    HX WISDOM TEETH EXTRACTION      ND OUTER EAR SURGERY PROC UNLISTED  1981    excision benign tumor behind right ear       No Known Allergies    Current Facility-Administered Medications   Medication Dose Route Frequency Provider Last Rate Last Dose    sodium chloride (NS) flush 10 mL  10 mL InterCATHeter PRN Yousif Pratt MD        enoxaparin (LOVENOX) injection 40 mg  40 mg SubCUTAneous Q24H Inés Cruz MD   40 mg at 05/13/17 1317    morphine CR (MS CONTIN) tablet 15 mg  15 mg Oral Q12H Sandra Zuluaga NP   15 mg at 05/12/17 2126    oxyCODONE IR (ROXICODONE) tablet 15 mg  15 mg Oral Q3H PRN Sandra Zuluaga NP   15 mg at 05/13/17 1315    senna-docusate (PERICOLACE) 8.6-50 mg per tablet 1 Tab  1 Tab Oral QHS Sandra Zuluaga NP   1 Tab at 05/12/17 2126    acetaminophen (TYLENOL) tablet 650 mg  650 mg Oral Q6H PRN Inés Cruz MD   650 mg at 05/13/17 0025    sodium chloride (NS) flush 5-10 mL  5-10 mL IntraVENous Q8H Dwayne Garcia MD   10 mL at 05/13/17 1317    sodium chloride (NS) flush 5-10 mL  5-10 mL IntraVENous PRN Dwayne Garcia MD   10 mL at 05/11/17 0851    ondansetron (ZOFRAN) injection 4 mg  4 mg IntraVENous Q6H PRN Dwayne Garcia MD   4 mg at 05/10/17 7448    LORazepam (ATIVAN) injection 0.5-1 mg  0.5-1 mg IntraVENous Q6H PRN Belinda Luu NP        pantoprazole (PROTONIX) 40 mg in sodium chloride 0.9 % 10 mL injection  40 mg IntraVENous DAILY Whitney Correa NP   40 mg at 05/13/17 6799       Social History     Social History    Marital status:      Spouse name: N/A    Number of children: 0    Years of education: N/A     Occupational History    Homemaker; 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 Alcohol use No      Comment: very very rare    Drug use: No    Sexual activity: Not Currently     Other Topics Concern    Occupational Exposure No     down to 1 cup of cooffee qam; was up to 3-4 cups up unitl ~ the past few weeks    Weight Concern Yes     happy she has lost 20 lbs since strict diet and exercise    Special Diet Yes     since beginning of August went on strict diet of carb cuonting, low fat, protein and calorie counts; well balanced    Exercise Yes     swims a lot and since August started walking about 2-5 x a week x 45 minutes     Social History Narrative       Family History   Problem Relation Age of Onset    Stroke Mother     Heart Disease Mother     Heart Disease Father     Arthritis-rheumatoid Sister     Cancer Brother      skin cancer    HIV/AIDS Brother     Stroke Brother 79     TIA's    Heart Disease Brother 79     CABG    Stroke Maternal Grandmother     Heart Attack Maternal Grandfather     Heart Attack Paternal Grandmother     Heart Attack Paternal Grandfather     Colon Cancer Sister     Anesth Problems Neg Hx        ROS  As per the HPI, otherwise a comprehensive ROS is negative. ECOG PS is 1. Emotional well being addressed and patient is coping appropriately to new cancer diagnosis. Physical Examination:   Visit Vitals    /87 (BP 1 Location: Right arm, BP Patient Position: At rest)    Pulse 91    Temp 98.1 °F (36.7 °C)    Resp 18    Ht 5' 7\" (1.702 m)    Wt 195 lb (88.5 kg)    LMP 10/01/2005 (Approximate)    SpO2 96%    BMI 30.54 kg/m2     General appearance - alert, pleasant, no distress  Mental status - oriented to person, place, and time  Mouth - mucous membranes moist, pharynx normal without lesions  Neck - supple, no significant adenopathy  Chest - clear to auscultation, no wheezes, rales or rhonchi, symmetric air entry  Heart - normal rate, regular rhythm, normal S1, S2, no murmurs, rubs, clicks or gallops  Abdomen - soft, non-tender to light palpation, +bowel sounds  Neurological - normal speech, no focal findings or movement disorder noted  Extremities - peripheral pulses normal, no pedal edema  Skin - normal coloration and turgor, no rashes, no suspicious skin lesions noted    LABS  Lab Results   Component Value Date/Time    WBC 5.6 05/09/2017 03:22 AM    HGB 13.2 05/09/2017 03:22 AM    HCT 39.0 05/09/2017 03:22 AM    PLATELET 120 77/33/9849 03:22 AM    MCV 90.1 05/09/2017 03:22 AM    ABS.  NEUTROPHILS 3.0 05/09/2017 03:22 AM     Lab Results   Component Value Date/Time    Sodium 140 05/09/2017 03:22 AM    Potassium 3.6 05/09/2017 03:22 AM    Chloride 106 05/09/2017 03:22 AM    CO2 27 05/09/2017 03:22 AM    Glucose 106 05/09/2017 03:22 AM    BUN 8 05/09/2017 03:22 AM    Creatinine 0.55 05/09/2017 03:22 AM    GFR est AA >60 05/09/2017 03:22 AM    GFR est non-AA >60 05/09/2017 03:22 AM    Calcium 8.6 05/09/2017 03:22 AM     Lab Results   Component Value Date/Time    AST (SGOT) 9 05/09/2017 03:22 AM    Alk. phosphatase 77 05/09/2017 03:22 AM    Protein, total 6.1 05/09/2017 03:22 AM    Albumin 3.6 05/09/2017 03:22 AM    Globulin 2.5 05/09/2017 03:22 AM    A-G Ratio 1.4 05/09/2017 03:22 AM       PATHOLOGY  5/9/17 FNA: Predominantly blood and mature lymphocytes with rare clusters of benign gastric epithelial cells. IMAGING    CT Chest 5/11/17  IMPRESSION:   1. A 4 mm lung nodule in the left upper lobe is nonspecific. Follow-up chest CT  is recommended in 3 months. 2. A pancreatic mass is again demonstrated as seen on recent abdomen CT. CT 5/8/17  IMPRESSION:  1. No acute process on CT. 2. 2.5 cm pancreatic body lesion may represent malignant neoplasm and involves  the splenic artery and celiac artery. Consider nonemergent endoscopic ultrasound  and biopsy versus MRI abdomen. ASSESSMENT  Ms. Tejas Doss is a 60 y.o. female with newly diagnosed pancreatic mass on CT imaging 5/8/17. Preliminary pathology is malignant. T3N0 (celiac involvement) and currently unresectable. Questionable 4 mm left upper lobe nodule and no clear evidence of metastasis. She presented with pancreatitis and abdominal pain. Pancreatitis has subsided, pain is controlled on IV opioids. DISCUSSION/PLAN  1. Pancreatic cancer, unresectable  Plan is for chemotherapy with FOLFIRINOX, to begin as outpatient. Follow up with Dr. Anthony Gómez. 2. Abdominal pain. Much improved this afternoon with PO oxycodone. Palliative following. 3. Sleep apnea. Wears CPAP at home. Family to bring hers into the hospital.    4. Fatigue. Related to underlying malignancy. This is stable. 5. Nausea. Significant improved. GI is on board. PRN zofran as needed. [id number] [address] for discharge from oncology perspective when pain adequately controlled.   Outpatient follow up with  Gerald.        Signed by: Beatriz Perez MD                     May 13, 2017

## 2017-05-13 NOTE — CONSULTS
Palliative Medicine Consult  Bart: [phone] ([phone])    Patient Name: Yohnanes Whalen  YOB: 1955    Date of Initial Consult: May 12, 2017  Reason for Consult: Pain Management  Requesting Provider:  Dr. Charmian Burkitt  Primary Care Physician: Sepideh Garcia DO      SUMMARY:   Yohannes Whalen is a 60 y.o. with a past history of sleep apnea, gastric ulcer, GERD, allergic rhinitis, who was admitted on 5/8/2017 from home with generalized abdominal pain, diarrhea, chills, insomnia, constant lower back pain, cold intolerance,  Malaise, decreased appetite. CT of the abdomen and pelvis in the ED revealed a pancreatic mass highly suspicious for malignancy. Pt has had vague s/s for which she was seeing her PCP. All of her tests were normal.  She was seen by physical therapy for presumed myofacial pain. She was using topical agents for pain and hydrocodone. She noticed she had loss weight and had appetite changes. Current medical issues leading to Palliative Medicine involvement include: Pain Management. Psychosocial:  36 years, no children, last job was with [employer], 6 siblings, born in [place], earned a degree in social work from the [employer], moved to [facility] for a while then back home to [place] then [place] with her spouse, moved to [place] because of her 's job, no family in [facility], pt has a personal relationship with God, loves reading, biking, pool, documentaries, and history     PALLIATIVE DIAGNOSES:   1. Acute abdominal pain  2. Unintentional weight loss  3. Decreased appetite  4. Opioid induced constipation   5. Pancreatic Mass     PLAN:   1. Continue morphine 15-mg every 12 hours  2. Continue oxycodone 15-mg every 3 hours as needed. 3. Patient instructed to take oxycodone when her pain level gets to 4/10  4. Continue ondansetron 4-mg IV every 6 hours as needed. Please give 30-minutes prior to giving morphine  5. Start promethazine 25-mg IV every 6 hours as needed.    6. Continue senna 1 tab at bedtime. 7. Continue acetaminophen  8. Continue lorazepam 0.5- mg IV every 6 hours as needed  9. I talked with patient and her  about possible discharge tomorrow morning with follow-up in [facility]  10. Please include number for Palliative Medicine [phone]) on-call physician on discharge summary  11. Communicated plan of care with: Palliative IDT, bedside RN       GOALS OF CARE / TREATMENT PREFERENCES:   [====Goals of Care====]  GOALS OF CARE:  Patient / health care proxy stated goals: pain control and chemo      TREATMENT PREFERENCES:   Code Status: Full Code    Advance Care Planning:  Advance Care Planning 5/8/2017   Patient's Healthcare Decision Maker is: Named in scanned ACP document   Primary Decision Maker Name Júnior Szymanski   Primary Decision Maker Phone Number [phone]   Primary Decision Maker Relationship to Patient Spouse   Secondary Decision Maker Name [facility] Darrian   Secondary Decision Maker Relationship to Patient Sibling   Confirm Advance Directive Yes, on file       Other:    The palliative care team has discussed with patient / health care proxy about goals of care / treatment preferences for patient.  [====Goals of Care====]         HISTORY:     History obtained from: chart    CHIEF COMPLAINT: pain not controlled well    HPI/SUBJECTIVE:    The patient is:   [x] Verbal and participatory  [] Non-participatory due to: She had a bad night last night. She didn't sleep as well as with the IV pain medication. She was afraid to take the oxycodone because she felt her pain had ton be an 8, 9 or 10.      Clinical Pain Assessment (nonverbal scale for severity on nonverbal patients):   [++++ Clinical Pain Assessment++++]  [++++Pain Severity++++]: Pain: 4  [++++Pain Character++++]:  Hurts to wear a bra but its not skin pain  [++++Pain Duration++++]: on and off for months  [++++Pain Effect++++]: decreased appetite, malaise  [++++Pain Factors++++]: certain foods exacerbate s/s  [++++Pain Frequency++++]: all the time  [++++Pain Location++++]: lower back across abdomen and underneath both breasts  [++++ Clinical Pain Assessment++++]     FUNCTIONAL ASSESSMENT:     Palliative Performance Scale (PPS):  PPS: 90       PSYCHOSOCIAL/SPIRITUAL SCREENING:     Advance Care Planning:  Advance Care Planning 5/8/2017   Patient's Healthcare Decision Maker is: Named in scanned ACP document   Primary Decision Maker Name Emerson Land   Primary Decision Maker Phone Number [phone]   Primary Decision Maker Relationship to Patient Spouse   Secondary Decision Maker [facility]   Secondary Decision Maker Relationship to Patient Sibling   Confirm Advance Directive Yes, on file        Any spiritual / Confucianist concerns:  [] Yes /  [x] No    Caregiver Burnout:  [] Yes /  [x] No /  [] No Caregiver Present      Anticipatory grief assessment:   [x] Normal  / [] Maladaptive       ESAS Anxiety: Anxiety: 0    ESAS Depression: Depression: 0        REVIEW OF SYSTEMS:     Positive and pertinent negative findings in ROS are noted above in HPI. The following systems were [x] reviewed / [] unable to be reviewed as noted in HPI  Other findings are noted below. Systems: constitutional, ears/nose/mouth/throat, respiratory, gastrointestinal, genitourinary, musculoskeletal, integumentary, neurologic, psychiatric, endocrine. Positive findings noted below. Modified ESAS Completed by: provider   Fatigue: 0 Drowsiness: 0   Depression: 0 Pain: 4   Anxiety: 0 Nausea: 0   Anorexia: 0 Dyspnea: 0     Constipation: No     Stool Occurrence(s): 1        PHYSICAL EXAM:     From RN flowsheet:  Wt Readings from Last 3 Encounters:   05/12/17 195 lb (88.5 kg)   12/31/14 220 lb (99.8 kg)   12/26/14 220 lb (99.8 kg)     Blood pressure 117/87, pulse 91, temperature 98.1 °F (36.7 °C), resp. rate 18, height 5' 7\" (1.702 m), weight 195 lb (88.5 kg), last menstrual period 10/01/2005, SpO2 96 %.     Pain Scale 1: Numeric (0 - 10)  Pain Intensity 1: 5  Pain Onset 1: chronic  Pain Location 1: Abdomen, Chest, Head  Pain Orientation 1: Right, Anterior  Pain Description 1: Aching  Pain Intervention(s) 1: Medication (see MAR)  Last bowel movement, if known:     Constitutional: sitting on side of bed, no acute distress  Eyes: pupils equal, anicteric  ENMT: no nasal discharge, moist mucous membranes  Cardiovascular: regular rhythm, distal pulses intact  Respiratory: breathing not labored, symmetric  Gastrointestinal: soft non-tender, +bowel sounds  Musculoskeletal: no deformity, no tenderness to palpation  Skin: warm, dry  Neurologic: following commands, moving all extremities  Psychiatric: full affect, no hallucinations  Other:       HISTORY:     Principal Problem:    Pancreatic mass (5/8/2017)    Active Problems:    Generalized abdominal pain (5/8/2017)      Past Medical History:   Diagnosis Date    Allergic cough 10/5/2010    AR (allergic rhinitis)     cough    GERD (gastroesophageal reflux disease)     Tx FOR \"REALLY BAD HEARTBURN\" IN 2013    Nausea & vomiting     PMS (premenstrual syndrome) 12/26/14    IN PAST, WAS SEVERE; MENOPAUSE 10 YRS AGO, PT SAYS    Postmenopause, LMP 51yo, NO HRT 10/5/2010    S/P normal colonoscopy 2004 10/5/2010    Unspecified sleep apnea 12/26/14    USES CPAP      Past Surgical History:   Procedure Laterality Date    DEXA BONE DENSITY STUDY AXIAL  2/2004    Normal; Dr Steffi Esquivel (prev Gyn)   [id number] [facility]  33YN    benign AUB; negative    ENDOSCOPY, COLON, DIAGNOSTIC  12/26/14    LAST COLONOSCOPY JAN 2014; PRIOR SCOPE 2004    HX RHINOPLASTY  1986    w/ chin implant (mentoplasty)    HX WISDOM TEETH EXTRACTION      NE OUTER EAR SURGERY PROC UNLISTED  1981    excision benign tumor behind right ear      Family History   Problem Relation Age of Onset    Stroke Mother     Heart Disease Mother     Heart Disease Father     Arthritis-rheumatoid Sister     Cancer Brother      skin cancer    HIV/AIDS Brother     Stroke Brother 79     TIA's    Heart Disease Brother 79     CABG    Stroke Maternal Grandmother     Heart Attack Maternal Grandfather     Heart Attack Paternal Grandmother     Heart Attack Paternal Grandfather     Colon Cancer Sister     Anesth Problems Neg Hx       History reviewed, no pertinent family history.   Social History   Substance Use Topics    Smoking status: Former Smoker     Years: 6.00     Quit date: 9/27/1980    Smokeless tobacco: Never Used      Comment: used to smoke about 2 packs a week from college 1974 through . [address] 38 Alcohol use No      Comment: very very rare     No Known Allergies   Current Facility-Administered Medications   Medication Dose Route Frequency    sodium chloride (NS) flush 10 mL  10 mL InterCATHeter PRN    enoxaparin (LOVENOX) injection 40 mg  40 mg SubCUTAneous Q24H    morphine CR (MS CONTIN) tablet 15 mg  15 mg Oral Q12H    oxyCODONE IR (ROXICODONE) tablet 15 mg  15 mg Oral Q3H PRN    senna-docusate (PERICOLACE) 8.6-50 mg per tablet 1 Tab  1 Tab Oral QHS    acetaminophen (TYLENOL) tablet 650 mg  650 mg Oral Q6H PRN    sodium chloride (NS) flush 5-10 mL  5-10 mL IntraVENous Q8H    sodium chloride (NS) flush 5-10 mL  5-10 mL IntraVENous PRN    ondansetron (ZOFRAN) injection 4 mg  4 mg IntraVENous Q6H PRN    LORazepam (ATIVAN) injection 0.5-1 mg  0.5-1 mg IntraVENous Q6H PRN    pantoprazole (PROTONIX) 40 mg in sodium chloride 0.9 % 10 mL injection  40 mg IntraVENous DAILY          LAB AND IMAGING FINDINGS:     Lab Results   Component Value Date/Time    WBC 5.6 05/09/2017 03:22 AM    HGB 13.2 05/09/2017 03:22 AM    PLATELET 374 24/65/7677 03:22 AM     Lab Results   Component Value Date/Time    Sodium 140 05/09/2017 03:22 AM    Potassium 3.6 05/09/2017 03:22 AM    Chloride 106 05/09/2017 03:22 AM    CO2 27 05/09/2017 03:22 AM    BUN 8 05/09/2017 03:22 AM    Creatinine 0.55 05/09/2017 03:22 AM    Calcium 8.6 05/09/2017 03:22 AM      Lab Results   Component Value Date/Time    AST (SGOT) 9 05/09/2017 03:22 AM    Alk. phosphatase 77 05/09/2017 03:22 AM    Protein, total 6.1 05/09/2017 03:22 AM    Albumin 3.6 05/09/2017 03:22 AM    Globulin 2.5 05/09/2017 03:22 AM     No results found for: INR, PTMR, PTP, PT1, PT2, APTT   No results found for: IRON, FE, TIBC, IBCT, PSAT, FERR   No results found for: PH, PCO2, PO2  No components found for: GLPOC   No results found for: CPK, CKMB             Total time: 70 minutes  Counseling / coordination time, spent as noted above: 50 minutes  > 50% counseling / coordination?: y    Prolonged service was provided for  []30 min   []75 min in face to face time in the presence of the patient, spent as noted above. Time Start:   Time End:   Note: this can only be billed with 74376 (initial) or 96983 (follow up). If multiple start / stop times, list each separately.

## 2017-05-13 NOTE — PROGRESS NOTES
Bedside shift change report given to David Echavarria RN (oncoming nurse) by Nilton Sewell RN (offgoing nurse). Report included the following information SBAR, Kardex, Procedure Summary, Intake/Output, MAR, Recent Results and Med Rec Status.

## 2017-05-13 NOTE — PROGRESS NOTES
Bedside shift change report given to Omid Loomis RN (oncoming nurse) by Davida Bazan RN (offgoing nurse). Report included the following information SBAR and MAR. Pt Refused MS contin, patient reports this medication makes her feel miserable. Palliative paged, will come see patient this afternoon. Per Dr. Viet Son, administer MS Contin once at [phone], then administer scheduled MS Contin at 2100 tonight.

## 2017-05-13 NOTE — PROGRESS NOTES
Hospitalist Progress Note  Office: [phone]      Date of Service:  2017  NAME:  Jose Manuel Borja  :  1955  MRN:  [id number]      Admission Summary:   61 yo woman with GERD, EMERY on CPAP, chronic generalized pain presented from home on 17 with abdominal pain, nausea, vomiting, and diarrhea. CT abd/pelvis in the ED revealed pancreatic mass. She was admitted to the medical floor with newly discovered pancreatic mass. Interval history / Subjective:   I saw her walking in the hallway,comfortable,not in pain. Assessment & Plan:     Pancreatic mass,patholopgy +for cancer, unresectable (POA)  - seen on CT abd/pelvis and concerning for malignancy  - unintentional weight loss of about 20 pounds in the last year  -Non operable. -To start Neoadjuvant chemotherapy  -Port placed     Acute cancer pain  -Initially used Iv dilaudid.  -Pain worsened once iv dilaudid was stopped. -Pain management is going to a challenging issues for patient as she expresses cocern already,she has been suffering from severe pain for months.  -Palliative care team consulted,  -She is now on scheduled Ms Contin and Roxicodone for breakthrough  -Discussed with palliative MD who suggested watching one more day before discharge to ensure pain is controlled     Acute pancreatitis  -Lipase peaked>3000,normalized  -Improved. Toleraing diet    Chronic pain syndrome last few months likely due to the cancer    Diarrhea (PTA)  - resolved, last BM was 5/6    Unintentional weight loss. Body mass index is 30.54 kg/(m^2). -Due to cancer. Nutrition support. EMERY on CPAP -using own CPAP in the hospital.  Code status: Full  DVT prophylaxis: no surgery planned,has completed port placement, thus started on chemical prophylaxis with Lovenox. Care Plan discussed with: Patient/Family and Nurse  Disposition:  -If pain remains controlled,will discharge home tomorrow. She needs prescription for Ms Contin and Roxicodone. Hospital Problems  Date Reviewed: 5/8/2017          Codes Class Noted POA    Generalized abdominal pain ICD-10-CM: R10.84  ICD-9-CM: 789.07  5/8/2017 Unknown        * (Principal)Pancreatic mass ICD-10-CM: K86.9  ICD-9-CM: 577.9  5/8/2017 Unknown            Review of Systems:   Pertinent items are noted in HPI. Vital Signs:    Last 24hrs VS reviewed since prior progress note. Most recent are:  Visit Vitals    /90 (BP 1 Location: Right arm, BP Patient Position: At rest)    Pulse 74    Temp 98 °F (36.7 °C)    Resp 18    Ht 5' 7\" (1.702 m)    Wt 88.5 kg (195 lb)    SpO2 99%    BMI 30.54 kg/m2       Intake/Output Summary (Last 24 hours) at 05/13/17 1541  Last data filed at 05/12/17 2230   Gross per 24 hour   Intake              240 ml   Output                0 ml   Net              240 ml      Physical Examination:     Constitutional:  Awake, no acute distress, cooperative, pleasant. Walking on the hallway   ENT:  oral mucosa moist, oropharynx benign  Neck supple  Right chest wall port insertion small wound clean, dry intact. Resp:  CTA bilaterally, no wheezing/rhonchi/rales   CV:  regular rhythm, normal rate, no m/r/g appreciated, no edema, +pulses    GI:  +BS, soft, non distended, mild LUQ TTP     Musculoskeletal:  moves all extremities    Neurologic:  AAOx3, NFD     Skin:  warm, dry  Eyes:  PERRL    Data Review:    Review and/or order of clinical lab test  Review and/or order of tests in the radiology section of CPT  Review and/or order of tests in the medicine section of CPT    Labs:     No results for input(s): WBC, HGB, HCT, PLT, HGBEXT, HCTEXT, PLTEXT, HGBEXT, HCTEXT, PLTEXT in the last 72 hours. No results for input(s): NA, K, CL, CO2, BUN, CREA, GLU, CA, MG, PHOS, URICA in the last 72 hours. No results for input(s): SGOT, GPT, ALT, AP, TBIL, TBILI, TP, ALB, GLOB, GGT, AML, LPSE in the last 72 hours.     No lab exists for component: AMYP, HLPSE  No results for input(s): INR, PTP, APTT in the last 72 hours. No lab exists for component: INREXT, INREXT   No results for input(s): FE, TIBC, PSAT, FERR in the last 72 hours. No results found for: FOL, RBCF   No results for input(s): PH, PCO2, PO2 in the last 72 hours. No results for input(s): CPK, CKNDX, TROIQ in the last 72 hours.     No lab exists for component: CPKMB  Lab Results   Component Value Date/Time    Cholesterol, total 209 09/13/2012 12:11 PM    HDL Cholesterol 57 09/13/2012 12:11 PM    LDL, calculated 125 09/13/2012 12:11 PM    Triglyceride 134 09/13/2012 12:11 PM    CHOL/HDL Ratio 3.5 10/05/2010 09:35 AM     No results found for: GLUCPOC  Lab Results   Component Value Date/Time    Color DARK YELLOW 10/07/2010 02:35 PM    Appearance CLEAR 10/07/2010 02:35 PM    Specific gravity 1.006 10/07/2010 02:35 PM    pH (UA) 7.0 10/07/2010 02:35 PM    Protein NEGATIVE  10/07/2010 02:35 PM    Glucose NEGATIVE  10/07/2010 02:35 PM    Ketone NEGATIVE  10/07/2010 02:35 PM    Bilirubin NEGATIVE  10/07/2010 02:35 PM    Urobilinogen 0.2 10/07/2010 02:35 PM    Nitrites NEGATIVE  10/07/2010 02:35 PM    Leukocyte Esterase NEGATIVE  10/07/2010 02:35 PM    Epithelial cells 0-5 10/07/2010 02:35 PM    Bacteria NEGATIVE  10/07/2010 02:35 PM    WBC 0-4 10/07/2010 02:35 PM    RBC 0-3 10/07/2010 02:35 PM     Medications Reviewed:     Current Facility-Administered Medications   Medication Dose Route Frequency    prochlorperazine (COMPAZINE) with saline injection 10 mg  10 mg IntraVENous Q6H PRN    oxyCODONE IR (ROXICODONE) tablet 15 mg  15 mg Oral Q3H PRN    sodium chloride (NS) flush 10 mL  10 mL InterCATHeter PRN    enoxaparin (LOVENOX) injection 40 mg  40 mg SubCUTAneous Q24H    morphine CR (MS CONTIN) tablet 15 mg  15 mg Oral Q12H    senna-docusate (PERICOLACE) 8.6-50 mg per tablet 1 Tab  1 Tab Oral QHS    acetaminophen (TYLENOL) tablet 650 mg  650 mg Oral Q6H PRN    sodium chloride (NS) flush 5-10 mL 5-10 mL IntraVENous Q8H    sodium chloride (NS) flush 5-10 mL  5-10 mL IntraVENous PRN    ondansetron (ZOFRAN) injection 4 mg  4 mg IntraVENous Q6H PRN    LORazepam (ATIVAN) injection 0.5-1 mg  0.5-1 mg IntraVENous Q6H PRN    pantoprazole (PROTONIX) 40 mg in sodium chloride 0.9 % 10 mL injection  40 mg IntraVENous DAILY     ______________________________________________________________________  EXPECTED LENGTH OF STAY: 5d 0h  ACTUAL LENGTH OF STAY:          5                 Yeni Arteaga MD

## 2017-05-14 PROBLEM — K86.89 PANCREATIC MASS: Status: RESOLVED | Noted: 2017-01-01 | Resolved: 2017-01-01

## 2017-05-14 PROBLEM — R10.84 GENERALIZED ABDOMINAL PAIN: Status: RESOLVED | Noted: 2017-01-01 | Resolved: 2017-01-01

## 2017-05-14 PROBLEM — C25.9 PANCREATIC CANCER (HCC): Status: ACTIVE | Noted: 2017-01-01

## 2017-05-14 PROBLEM — C25.9 PANCREATIC CANCER (HCC): Status: RESOLVED | Noted: 2017-01-01 | Resolved: 2017-01-01

## 2017-05-14 NOTE — PROGRESS NOTES
Hematology/Oncology Progress Note    REASON FOR VISIT: Pancreatic Mass    HISTORY OF PRESENT ILLNESS: Ms. Karan Diane is a 60 y.o. female who we are asked to see for new pancreatic mass. She presented to the ED on 5/8/17 with severe abdominal pain. She has been experiencing this for months now but states that location and intensity of abdominal pain can change and move at times from upper abdomen to lower abdomen. She has also been experiencing back/shoulder pain for which she had been trying to go to Physical Therapy to manage. This provided little relief for her. Medications over the counter had also provided little relief. She notes a significant decrease in her energy for months and tells me that little tasks through out the day such as cleaning or doing dishes would exhaust her, resting after minor household chores. She has been unable to cook dinner at night due to exhaustion. Her  noted this change in her as well. Her appetite has decreased recently and she has experienced unintentional weight loss of approximately 20 pounds since December. CT abdomen/pelvis completed in the ED revealed a 2.5cm pancreatic body lesion that involved the splenic artery and celiac artery for which we are consulted. Dr. Heather Beltran with GI performed an EUS 5/9/17 with preliminary path malignant. Dr. Asia Barkley with Surgery has been consulted. Ms. Karan Diane denies any personal history of cancer. Significant family history which includes her sister who passed away from colon cancer, her father who passed away from renal cancer, her older brother with skin cancer, and another brother with cancer of unknown etiology. Notes that her niece was recently diagnosed with ovarian cancer. She states she has had all routine cancer screenings such as mammograms/colonoscopies and these have been normal. She worked as an  and  over the years. Denies any significant exposure to occupational hazards/chemicals. Does note that [place] pesticides were sprayed frequently. She tells me that she drank and smoked cigarettes while in college but quit at that time. Currently lives in [place] with her . INTERVAL HISTORY:   She reports feeling much better today. Yesterday evening was somewhat drowsy after increased dose of MS contin. Did not need more of the oxycodone until this morning. No nausea. Hoping to go home today.     Past Medical History:   Diagnosis Date    Allergic cough 10/5/2010    AR (allergic rhinitis)     cough    GERD (gastroesophageal reflux disease)     Tx FOR \"REALLY BAD HEARTBURN\" IN 2013    Nausea & vomiting     PMS (premenstrual syndrome) 12/26/14    IN PAST, WAS SEVERE; MENOPAUSE 10 YRS AGO, PT SAYS    Postmenopause, LMP 51yo, NO HRT 10/5/2010    S/P normal colonoscopy 2004 10/5/2010    Unspecified sleep apnea 12/26/14    USES CPAP       Past Surgical History:   Procedure Laterality Date    DEXA BONE DENSITY STUDY AXIAL  2/2004    Normal; Dr Lana Diaz (prev Gyn)   [address] [facility]  30yo    benign AUB; negative    ENDOSCOPY, COLON, DIAGNOSTIC  12/26/14    LAST COLONOSCOPY JAN 2014; PRIOR SCOPE 2004    HX RHINOPLASTY  1986    w/ chin implant (mentoplasty)    HX WISDOM TEETH EXTRACTION      DE OUTER EAR SURGERY PROC UNLISTED  1981    excision benign tumor behind right ear       No Known Allergies    Current Facility-Administered Medications   Medication Dose Route Frequency Provider Last Rate Last Dose    prochlorperazine (COMPAZINE) with saline injection 10 mg  10 mg IntraVENous Q6H PRN Inés Cruz MD   10 mg at 05/13/17 2148    oxyCODONE IR (ROXICODONE) tablet 15 mg  15 mg Oral Q3H PRN Guille Goodman MD   15 mg at 05/14/17 0819    sodium chloride (NS) flush 10 mL  10 mL InterCATHeter PRN Corina Gifford MD        enoxaparin (LOVENOX) injection 40 mg  40 mg SubCUTAneous Q24H Zakiya Huerta MD   Stopped at 05/14/17 1100    morphine CR (MS CONTIN) tablet 15 mg  15 mg Oral Q12H Sandra Zuluaga NP   15 mg at 17 1100    senna-docusate (PERICOLACE) 8.6-50 mg per tablet 1 Tab  1 Tab Oral QHS Sandra Zuluaga NP   1 Tab at 17 2148    acetaminophen (TYLENOL) tablet 650 mg  650 mg Oral Q6H PRN Inés Cruz MD   650 mg at 17 0025    sodium chloride (NS) flush 5-10 mL  5-10 mL IntraVENous Q8H Shanice Cantu MD   10 mL at 17 0818    sodium chloride (NS) flush 5-10 mL  5-10 mL IntraVENous PRN Shanice Cantu MD   10 mL at 17 0851    ondansetron (ZOFRAN) injection 4 mg  4 mg IntraVENous Q6H PRN Shanice Cantu MD   4 mg at 05/10/17 2600    LORazepam (ATIVAN) injection 0.5-1 mg  0.5-1 mg IntraVENous Q6H PRN Josh Blank NP        pantoprazole (PROTONIX) 40 mg in sodium chloride 0.9 % 10 mL injection  40 mg IntraVENous DAILY Whitney Arce NP   40 mg at 17 0955       Social History     Social History    Marital status:      Spouse name: N/A    Number of children: 0    Years of education: N/A     Occupational History    Homemaker; 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[employer];  Former  yrs ago      Social History Main Topics    Smoking status: Former Smoker     Years: 6.00     Quit date: 1980    Smokeless tobacco: Never Used      Comment: used to smoke about 2 packs a week from college 1974 through [address] Alcohol use No      Comment: very very rare    Drug use: No    Sexual activity: Not Currently     Other Topics Concern    Occupational Exposure No     down to 1 cup of cooffee qam; was up to 3-4 cups up unitl ~ the past few weeks    Weight Concern Yes     happy she has lost 20 lbs since strict diet and exercise    Special Diet Yes     since beginning of August went on strict diet of carb cuonting, low fat, protein and calorie counts; well balanced    Exercise Yes     swims a lot and since August started walking about 2-5 x a week x 45 minutes     Social History Narrative Family History   Problem Relation Age of Onset   [facility] Stroke Mother     Heart Disease Mother     Heart Disease Father     Arthritis-rheumatoid Sister     Cancer Brother      skin cancer    HIV/AIDS Brother     Stroke Brother 79     TIA's    Heart Disease Brother 79     CABG    Stroke Maternal Grandmother     Heart Attack Maternal Grandfather     Heart Attack Paternal Grandmother     Heart Attack Paternal Grandfather     Colon Cancer Sister     Anesth Problems Neg Hx        ROS  As per the HPI, otherwise a comprehensive ROS is negative. ECOG PS is 1. Emotional well being addressed and patient is coping appropriately to new cancer diagnosis. Physical Examination:   Visit Vitals    /52 (BP 1 Location: Right arm, BP Patient Position: At rest)    Pulse 69    Temp 98 °F (36.7 °C)    Resp 19    Ht 5' 7\" (1.702 m)    Wt 195 lb (88.5 kg)    LMP 10/01/2005 (Approximate)    SpO2 98%    BMI 30.54 kg/m2     General appearance - alert, pleasant, no distress  Mental status - oriented to person, place, and time  Mouth - mucous membranes moist, pharynx normal without lesions  Neck - supple, no significant adenopathy  Chest - clear to auscultation, no wheezes, rales or rhonchi, symmetric air entry  Heart - normal rate, regular rhythm, normal S1, S2, no murmurs, rubs, clicks or gallops  Abdomen - soft, non-tender to light palpation, +bowel sounds  Neurological - normal speech, no focal findings or movement disorder noted  Extremities - peripheral pulses normal, no pedal edema  Skin - normal coloration and turgor, no rashes, no suspicious skin lesions noted    LABS  Lab Results   Component Value Date/Time    WBC 5.6 05/09/2017 03:22 AM    HGB 13.2 05/09/2017 03:22 AM    HCT 39.0 05/09/2017 03:22 AM    PLATELET 613 30/25/9020 03:22 AM    MCV 90.1 05/09/2017 03:22 AM    ABS.  NEUTROPHILS 3.0 05/09/2017 03:22 AM     Lab Results   Component Value Date/Time    Sodium 140 05/09/2017 03:22 AM    Potassium 3.6 05/09/2017 03:22 AM    Chloride 106 05/09/2017 03:22 AM    CO2 27 05/09/2017 03:22 AM    Glucose 106 05/09/2017 03:22 AM    BUN 8 05/09/2017 03:22 AM    Creatinine 0.55 05/09/2017 03:22 AM    GFR est AA >60 05/09/2017 03:22 AM    GFR est non-AA >60 05/09/2017 03:22 AM    Calcium 8.6 05/09/2017 03:22 AM     Lab Results   Component Value Date/Time    AST (SGOT) 9 05/09/2017 03:22 AM    Alk. phosphatase 77 05/09/2017 03:22 AM    Protein, total 6.1 05/09/2017 03:22 AM    Albumin 3.6 05/09/2017 03:22 AM    Globulin 2.5 05/09/2017 03:22 AM    A-G Ratio 1.4 05/09/2017 03:22 AM       PATHOLOGY  5/9/17 FNA: Predominantly blood and mature lymphocytes with rare clusters of benign gastric epithelial cells. IMAGING    CT Chest 5/11/17  IMPRESSION:   1. A 4 mm lung nodule in the left upper lobe is nonspecific. Follow-up chest CT  is recommended in 3 months. 2. A pancreatic mass is again demonstrated as seen on recent abdomen CT. CT 5/8/17  IMPRESSION:  1. No acute process on CT. 2. 2.5 cm pancreatic body lesion may represent malignant neoplasm and involves  the splenic artery and celiac artery. Consider nonemergent endoscopic ultrasound  and biopsy versus MRI abdomen. ASSESSMENT  Ms. Darian Ritchie is a 60 y.o. female with newly diagnosed pancreatic mass on CT imaging 5/8/17. Preliminary pathology is malignant. T3N0 (celiac involvement) and currently unresectable. Questionable 4 mm left upper lobe nodule and no clear evidence of metastasis. She presented with pancreatitis and abdominal pain. Pancreatitis has subsided, pain is controlled on IV opioids. DISCUSSION/PLAN  1. Pancreatic cancer, unresectable  Plan is for chemotherapy with FOLFIRINOX, to begin as outpatient. Follow up with Dr. Angelo Morgan. 2. Abdominal pain. Much better controlled now. Continue current regimen. 3. Sleep apnea. Wears CPAP at home. Family to bring hers into the hospital.    4. Fatigue. Related to underlying malignancy.  This is stable. 5. Nausea. Significant improved. GI is on board. PRN zofran as needed. Pain controlled, so ok for discharge from oncology perspective. Outpatient follow up with Dr. Anthony Gómez.         Signed by: Tosha Brian MD                     May 14, 2017

## 2017-05-14 NOTE — PROGRESS NOTES
Bedside shift change report given to [facility], RN (oncoming nurse) by Easton Escobedo RN (offgoing nurse). Report included the following information SBAR, Kardex, Intake/Output, MAR, Recent Results and Med Rec Status.

## 2017-05-14 NOTE — DISCHARGE SUMMARY
Discharge Summary       PATIENT ID: Deep Godwin  MRN: [id number]   YOB: 1955    DATE OF ADMISSION: 5/8/2017 12:03 AM    DATE OF DISCHARGE: 5/14/2017  PRIMARY CARE PROVIDER: Kristen Harris DO     ATTENDING PHYSICIAN: Zakiya Huerta MD  DISCHARGING PROVIDER: Zakiya Huerta MD    To contact this individual call [phone] and ask the  to page. If unavailable ask to be transferred the Adult Hospitalist Department. CONSULTATIONS: IP CONSULT TO HOSPITALIST  IP CONSULT TO GENERAL SURGERY  IP CONSULT TO GASTROENTEROLOGY  IP CONSULT TO ONCOLOGY  IP CONSULT TO PALLIATIVE CARE - PROVIDER    PROCEDURES/SURGERIES: Procedure(s) with comments:  ENDOSCOPIC ULTRASOUND (EUS) - eus  FINE NEEDLE ASPIRATION  ESOPHAGOGASTRODUODENOSCOPY (EGD)    ADMITTING DIAGNOSES & HOSPITAL COURSE:   Admission Summary:   59 yo woman with GERD, EMERY on CPAP, chronic generalized pain presented from home on 5/8/17 with abdominal pain, nausea, vomiting, and diarrhea. CT abd/pelvis in the ED revealed pancreatic mass. She was admitted to the medical floor with newly discovered pancreatic mass. Interval history / Subjective:   Pain well controlled. Myriam Couch to go home. Assessment & Plan:    Pancreatic mass,patholopgy +for cancer, unresectable (POA)  - seen on CT abd/pelvis and concerning for malignancy  - unintentional weight loss of about 20 pounds in the last year  -Non operable. -To start Neoadjuvant chemotherapy  -Port placed 5/12     Acute cancer pain  -Initially used Iv dilaudid.  -Pain worsened once iv dilaudid was stopped.   -Pain management is going to a challenging issues for patient as she expresses cocern already,she has been suffering from severe pain for months.  -Palliative care team consulted,  -She is now on scheduled Ms Contin and Roxicodone for breakthrough  -Discussed with palliative MD who suggested watching one more day before discharge to ensure pain is controlled   -Pain well controlled,home today.She advised to follow with Dr Taco Beltran and palliative care team next week     Acute pancreatitis  -Lipase peaked>3000,normalized  -Improved. Toleraing diet     Chronic pain syndrome last few months likely due to the cancer     Diarrhea (PTA)  - resolved, last BM was 5/6     Severe PCM unintentional weight loss. Body mass index is 30.54 kg/(m^2). -Due to cancer. Nutrition support. EMERY on CPAP -using own CPAP in the hospital.  Code status: Full  DVT prophylaxis: no surgery planned,has completed port placement, thus started on chemical prophylaxis with Lovenox. Care Plan discussed with: Patient/Family and Nurse  Disposition:  Home today            DISCHARGE DIAGNOSES / PLAN:      See above       PENDING TEST RESULTS:   At the time of discharge the following test results are still pending: none    FOLLOW UP APPOINTMENTS:    Follow-up Information     Follow up With Details Comments [address] Oleg[address] MD  Early next week. Our office will call to coordinate. [address] [facility] [address] [facility] 1901 [facility], [address] [facility] 100 [facility]  [phone]      Ami Nelson MD  Call office for appointment [address] [place] [address] Dr Miller [address] [facility] [address] [phone]               ADDITIONAL CARE RECOMMENDATIONS:  You would discuss the definitive care plan when you meet Dr Taco Beltran in the office next week. Do not drive  when you are taking opioid pain medications  Follow with the palliative care team, Dr Caty Duncan team for pain management. They would like to call for the on call person if you need help any time. This is their contact phone:([phone])     DIET: Regular Diet    ACTIVITY: Activity as tolerated    WOUND CARE: NA    EQUIPMENT needed: NA      DISCHARGE MEDICATIONS:  Current Discharge Medication List      START taking these medications    Details   morphine CR (MS CONTIN) 15 mg CR tablet Take 1 Tab by mouth every twelve (12) hours.  Max Daily Amount: 30 mg.  Qty: 20 Tab, Refills: 0      oxyCODONE IR (OXY-IR) 15 mg immediate release tablet Take 1 Tab by mouth every six (6) hours as needed. Max Daily Amount: 60 mg.  Qty: 20 Tab, Refills: 0      senna-docusate (PERICOLACE) 8.6-50 mg per tablet Take 1 Tab by mouth nightly for 30 days. Qty: 30 Tab, Refills: 0         CONTINUE these medications which have NOT CHANGED    Details   ondansetron (ZOFRAN ODT) 4 mg disintegrating tablet Take 4 mg by mouth every eight (8) hours as needed for Nausea. AZO CRANBERRY 579-21-46 mg-mg-million tab Take 1 Tab by mouth. therapeutic multivitamin-minerals (THERAGRAN-M) tablet Take 1 Tab by mouth daily. co-enzyme Q-10 (CO Q-10) 100 mg capsule Take 300 mg by mouth daily. MILK THISTLE, BULK, Take  by mouth. VITAMIN K2 PO Take 45 mcg by mouth. BUTTERBUR ROOT EXTRACT PO Take  by mouth daily. APPLE CIDER VINEGAR PO Take 450 mg by mouth daily. CHOLECALCIFEROL, VITAMIN D3, (VITAMIN D3 PO) Take 5,000 Int'l Units by mouth daily. MAGNESIUM PO Take 400 mg by mouth daily. ascorbic acid (VITAMIN C) 500 mg tablet Take 1,000 mg by mouth daily. ferrous sulfate (IRON) 325 mg (65 mg Iron) tablet Take  by mouth daily (before breakfast). cyanocobalamin (VITAMIN B-12) 1,000 mcg TbSR Take  by mouth daily. GLUC HCL/CSANA/GLY-AM-GLY,MX/C (COSAMIN DS PO) Take 1,500 mg by mouth daily. loratadine (CLARITIN) 10 mg tablet Take 10 mg by mouth daily. calcium-vitamin D (CALCIUM+D) 500 mg(1,250mg) -200 unit per tablet Take 1 Tab by mouth daily. vitamin E (AQUA GEMS) 400 unit capsule Take  by mouth daily. aspirin delayed-release 81 mg tablet Take 81 mg by mouth daily. NOTIFY YOUR PHYSICIAN FOR ANY OF THE FOLLOWING:   Fever over 101 degrees for 24 hours. Chest pain, shortness of breath, fever, chills, nausea, vomiting, diarrhea, change in mentation, falling, weakness, bleeding.  Severe pain or pain not relieved by medications. Or, any other signs or symptoms that you may have questions about. DISPOSITION:   x Home With:   OT  PT  HH  RN       Long term SNF/Inpatient Rehab    Independent/assisted living    Hospice    Other:       PATIENT CONDITION AT DISCHARGE:     Functional status    Poor     Deconditioned    x Independent      Cognition    x Lucid     Forgetful     Dementia      Catheters/lines (plus indication)    De Paz     PICC     PEG    x None      Code status    x Full code     DNR      PHYSICAL EXAMINATION AT DISCHARGE:     Visit Vitals    /68 (BP 1 Location: Right arm, BP Patient Position: At rest)    Pulse 65    Temp 98.1 °F (36.7 °C)    Resp 18    Ht 5' 7\" (1.702 m)    Wt 88.5 kg (195 lb)    LMP 10/01/2005 (Approximate)    SpO2 98%    BMI 30.54 kg/m2    O2 Flow Rate (L/min): 2 l/min O2 Device: Room air    Temp (24hrs), Av.3 °F (36.8 °C), Min:98 °F (36.7 °C), Max:98.7 °F (37.1 °C)    701 -  1900  In: 240 [P.O.:240]  Out: -     190 -  0700  In: 240 [P.O.:240]  Out: -     Constitutional:  Awake, no acute distress, cooperative, pleasant. Walking on the hallway   ENT:  oral mucosa moist, oropharynx benign  Neck supple  Right chest wall port insertion small wound clean, dry intact.    Resp:  CTA bilaterally, no wheezing/rhonchi/rales   CV:  regular rhythm, normal rate, no m/r/g appreciated, no edema, +pulses    GI:  +BS, soft, non distended, mild LUQ TTP     Musculoskeletal:  moves all extremities    Neurologic:  AAOx3, NFD                                             Skin:  warm, dry  Eyes:  PERRL         CHRONIC MEDICAL DIAGNOSES:  Problem List as of 2017  Date Reviewed: 2017          Codes Class Noted - Resolved    Obstructive sleep apnea (adult) (pediatric) ICD-10-CM: G47.33  ICD-9-CM: 327.23  10/1/2012 - Present        Postmenopause, LMP 51yo, NO HRT ICD-10-CM: Z78.0  ICD-9-CM: V49.81  10/5/2010 - Present        S/P normal colonoscopy  ICD-10-CM: S69.457  ICD-9-CM: V45.89  10/5/2010 - Present    Overview Signed 10/5/2010  8:55 AM by Ajit Stratton MD     10/04; [address] [facility],[address]; f/u 5 yrs; dr Tyrone Sheth             Allergic rhinitis ICD-10-CM: J30.9  ICD-9-CM: 477.9  10/5/2010 - Present        Allergic cough ICD-10-CM: R05  ICD-9-CM: 786.2  10/5/2010 - Present    Overview Signed 10/5/2010  9:21 AM by Ajit Stratton MD     2/2009             * (Principal)RESOLVED: Pancreatic cancer ([facility] [address] ICD-10-CM: C25.9  ICD-9-CM: 157.9  5/14/2017 - 5/14/2017        RESOLVED: Generalized abdominal pain ICD-10-CM: R10.84  ICD-9-CM: 789.07  5/8/2017 - 5/14/2017        RESOLVED: Pancreatic mass ICD-10-CM: K86.9  ICD-9-CM: 577.9  5/8/2017 - 5/14/2017                  Signed:    Lanae Phalen, MD  5/14/2017  3:07 PM

## 2017-05-14 NOTE — DISCHARGE INSTRUCTIONS
Discharge Instructions       PATIENT ID: Ute Adams  MRN: [id number]   YOB: 1955    DATE OF ADMISSION: 5/8/2017 12:03 AM    DATE OF DISCHARGE: 5/14/2017    PRIMARY CARE PROVIDER: Mariah Tim DO     ATTENDING PHYSICIAN: Ye Gallego MD  DISCHARGING PROVIDER: Ye Gallego MD    To contact this individual call [phone] and ask the  to page. If unavailable ask to be transferred the Adult Hospitalist Department. DISCHARGE DIAGNOSES   Pancreatic malignancy  Abdominal pain due to above  Pancreatitis  Nausea and vomiting    CONSULTATIONS: IP CONSULT TO HOSPITALIST  IP CONSULT TO GENERAL SURGERY  IP CONSULT TO GASTROENTEROLOGY  IP CONSULT TO ONCOLOGY  IP CONSULT TO PALLIATIVE CARE - PROVIDER    PROCEDURES/SURGERIES: Procedure(s) with comments:  ENDOSCOPIC ULTRASOUND (EUS) - eus  FINE NEEDLE ASPIRATION  ESOPHAGOGASTRODUODENOSCOPY (EGD)    PENDING TEST RESULTS:   At the time of discharge the following test results are still pending: linda    FOLLOW UP APPOINTMENTS:   Follow-up Information     Follow up With Details Comments [address] Oleg[address] MD  Early next week. Our office will call to coordinate. [address] [place] [address] [address], [address] [place] [address] [address]  [phone]      Zoë Lopez MD  Call office for appointment [address] [facility] [address] [phone]             ADDITIONAL CARE RECOMMENDATIONS:  You would discuss the definitive care plan when you meet Dr Shawn Diamond in the office next week. Do not drive  when you are taking opioid pain medications  Follow with the palliative care team, Dr Sandra Moran team for pain management. They would like to call for the on call person if you need help any time. This is their contact phone:([phone])     DIET: Regular Diet    ACTIVITY: Activity as tolerated    WOUND CARE: NA    EQUIPMENT needed: NA      DISCHARGE MEDICATIONS:   See Medication Reconciliation Form    · It is important that you take the medication exactly as they are prescribed. · Keep your medication in the bottles provided by the pharmacist and keep a list of the medication names, dosages, and times to be taken in your wallet. · Do not take other medications without consulting your doctor. NOTIFY YOUR PHYSICIAN FOR ANY OF THE FOLLOWING:   Fever over 101 degrees for 24 hours. Chest pain, shortness of breath, fever, chills, nausea, vomiting, diarrhea, change in mentation, falling, weakness, bleeding. Severe pain or pain not relieved by medications. Or, any other signs or symptoms that you may have questions about. DISPOSITION:   x Home With:   OT  PT  HH  RN       SNF/Inpatient Rehab/LTAC    Independent/assisted living    Hospice    Other:         Signed:    Basilia Miranda MD  5/14/2017  3:00 PM

## 2017-05-14 NOTE — PROGRESS NOTES
Hospitalist Progress Note  Office: [phone]      Date of Service:  2017  NAME:  Ute Adams  :  1955  MRN:  [id number]      Admission Summary:   61 yo woman with GERD, EMERY on CPAP, chronic generalized pain presented from home on 17 with abdominal pain, nausea, vomiting, and diarrhea. CT abd/pelvis in the ED revealed pancreatic mass. She was admitted to the medical floor with newly discovered pancreatic mass. Interval history / Subjective:   Pain well controlled. Brown April to go home. Assessment & Plan:     Pancreatic mass,patholopgy +for cancer, unresectable (POA)  - seen on CT abd/pelvis and concerning for malignancy  - unintentional weight loss of about 20 pounds in the last year  -Non operable. -To start Neoadjuvant chemotherapy  -Port placed     Acute cancer pain  -Initially used Iv dilaudid.  -Pain worsened once iv dilaudid was stopped. -Pain management is going to a challenging issues for patient as she expresses cocern already,she has been suffering from severe pain for months.  -Palliative care team consulted,  -She is now on scheduled Ms Contin and Roxicodone for breakthrough  -Discussed with palliative MD who suggested watching one more day before discharge to ensure pain is controlled   -Pain well controlled,home today. She advised to follow with Dr Shawn Diamond and palliative care team next week    Acute pancreatitis  -Lipase peaked>3000,normalized  -Improved. Toleraing diet    Chronic pain syndrome last few months likely due to the cancer    Diarrhea (PTA)  - resolved, last BM was 5/6    Unintentional weight loss. Body mass index is 30.54 kg/(m^2). -Due to cancer. Nutrition support. EMERY on CPAP -using own CPAP in the hospital.  Code status: Full  DVT prophylaxis: no surgery planned,has completed port placement, thus started on chemical prophylaxis with Lovenox.     Care Plan discussed with: Patient/Family and Nurse  Disposition:  Home today       Hospital Problems  Date Reviewed: 5/8/2017          Codes Class Noted POA    Generalized abdominal pain ICD-10-CM: R10.84  ICD-9-CM: 789.07  5/8/2017 Unknown        * (Principal)Pancreatic mass ICD-10-CM: K86.9  ICD-9-CM: 577.9  5/8/2017 Unknown            Review of Systems:   Pertinent items are noted in HPI. Vital Signs:    Last 24hrs VS reviewed since prior progress note. Most recent are:  Visit Vitals    /52 (BP 1 Location: Right arm, BP Patient Position: At rest)    Pulse 69    Temp 98 °F (36.7 °C)    Resp 19    Ht 5' 7\" (1.702 m)    Wt 88.5 kg (195 lb)    SpO2 98%    BMI 30.54 kg/m2       Intake/Output Summary (Last 24 hours) at 05/14/17 1409  Last data filed at 05/14/17 0817   Gross per 24 hour   Intake              240 ml   Output                0 ml   Net              240 ml      Physical Examination:     Constitutional:  Awake, no acute distress, cooperative, pleasant. Walking on the hallway   ENT:  oral mucosa moist, oropharynx benign  Neck supple  Right chest wall port insertion small wound clean, dry intact. Resp:  CTA bilaterally, no wheezing/rhonchi/rales   CV:  regular rhythm, normal rate, no m/r/g appreciated, no edema, +pulses    GI:  +BS, soft, non distended, mild LUQ TTP     Musculoskeletal:  moves all extremities    Neurologic:  AAOx3, NFD     Skin:  warm, dry  Eyes:  PERRL    Data Review:    Review and/or order of clinical lab test  Review and/or order of tests in the radiology section of CPT  Review and/or order of tests in the medicine section of CPT    Labs:     No results for input(s): WBC, HGB, HCT, PLT, HGBEXT, HCTEXT, PLTEXT, HGBEXT, HCTEXT, PLTEXT in the last 72 hours. No results for input(s): NA, K, CL, CO2, BUN, CREA, GLU, CA, MG, PHOS, URICA in the last 72 hours. No results for input(s): SGOT, GPT, ALT, AP, TBIL, TBILI, TP, ALB, GLOB, GGT, AML, LPSE in the last 72 hours.     No lab exists for component: AMYP, HLPSE  No results for input(s): INR, PTP, APTT in the last 72 hours. No lab exists for component: INREXT, INREXT   No results for input(s): FE, TIBC, PSAT, FERR in the last 72 hours. No results found for: FOL, RBCF   No results for input(s): PH, PCO2, PO2 in the last 72 hours. No results for input(s): CPK, CKNDX, TROIQ in the last 72 hours.     No lab exists for component: CPKMB  Lab Results   Component Value Date/Time    Cholesterol, total 209 09/13/2012 12:11 PM    HDL Cholesterol 57 09/13/2012 12:11 PM    LDL, calculated 125 09/13/2012 12:11 PM    Triglyceride 134 09/13/2012 12:11 PM    CHOL/HDL Ratio 3.5 10/05/2010 09:35 AM     No results found for: GLUCPOC  Lab Results   Component Value Date/Time    Color DARK YELLOW 10/07/2010 02:35 PM    Appearance CLEAR 10/07/2010 02:35 PM    Specific gravity 1.006 10/07/2010 02:35 PM    pH (UA) 7.0 10/07/2010 02:35 PM    Protein NEGATIVE  10/07/2010 02:35 PM    Glucose NEGATIVE  10/07/2010 02:35 PM    Ketone NEGATIVE  10/07/2010 02:35 PM    Bilirubin NEGATIVE  10/07/2010 02:35 PM    Urobilinogen 0.2 10/07/2010 02:35 PM    Nitrites NEGATIVE  10/07/2010 02:35 PM    Leukocyte Esterase NEGATIVE  10/07/2010 02:35 PM    Epithelial cells 0-5 10/07/2010 02:35 PM    Bacteria NEGATIVE  10/07/2010 02:35 PM    WBC 0-4 10/07/2010 02:35 PM    RBC 0-3 10/07/2010 02:35 PM     Medications Reviewed:     Current Facility-Administered Medications   Medication Dose Route Frequency    prochlorperazine (COMPAZINE) with saline injection 10 mg  10 mg IntraVENous Q6H PRN    oxyCODONE IR (ROXICODONE) tablet 15 mg  15 mg Oral Q3H PRN    sodium chloride (NS) flush 10 mL  10 mL InterCATHeter PRN    enoxaparin (LOVENOX) injection 40 mg  40 mg SubCUTAneous Q24H    morphine CR (MS CONTIN) tablet 15 mg  15 mg Oral Q12H    senna-docusate (PERICOLACE) 8.6-50 mg per tablet 1 Tab  1 Tab Oral QHS    acetaminophen (TYLENOL) tablet 650 mg  650 mg Oral Q6H PRN    sodium chloride (NS) flush 5-10 mL  5-10 mL IntraVENous Q8H    sodium chloride (NS) flush 5-10 mL  5-10 mL IntraVENous PRN    ondansetron (ZOFRAN) injection 4 mg  4 mg IntraVENous Q6H PRN    LORazepam (ATIVAN) injection 0.5-1 mg  0.5-1 mg IntraVENous Q6H PRN    pantoprazole (PROTONIX) 40 mg in sodium chloride 0.9 % 10 mL injection  40 mg IntraVENous DAILY     ______________________________________________________________________  EXPECTED LENGTH OF STAY: 5d 0h  ACTUAL LENGTH OF STAY:          6                 Gage Mccord MD

## 2017-05-15 NOTE — PROGRESS NOTES
[id number] [facility] Oncology at [facility]   Nurse Navigator Note      Patient returned my call. NN introduced self and role. Contact information provided. Patient stated she is feeling better---able to do more that she has in months. Patient reports her pain is well managed. She is taking MS Contin on a scheduled basis. Yesterday she took oxycodone IR x 2 yesterday and has not needed any oxycodone today. Patient has been walking outside enjoying the weather. She is doing stretching exercises as well. Patient spoke of concerns about how chemotherapy will effect her. NN reviewed common possible side effects and how they are managed/prevented (N&V, fatigue, low blood counts). Reviewed what to expect on infusion day. Patient shared she is frightened. She is concerned about QOL. Reports \"I am not afraid to die since I know where I am going. \" \"I would rather live 6 months feeling pretty good than 10 years feeling horrible like I did. \" Active listening and normalized feelings. Patient asked about her stage of disease. Stated she had heard 2 different nursing make the statement that she was stage 4 during their bedside shift reports. Patient believes she is stage 3. NN reviewed recent notes and was unable to see a stage written. Reinforced Dr. Jenna Pratt will share stage, plan of care and answer any questions she and her  may have at their OV this week. Patient wishes to be well informed regarding stage and outcomes so she can make the choices that are right for her. Patient's  is supportive and the person who is researching/gathering information online. He will be with patient at upcoming OV and chemo appointments. Patient would like chemo appointments on Fridays as her  can be off and not miss further time from work. NN to meet with patient in person after OV this week. Patient agrees to contact us if she has further questions or concerns.

## 2017-05-19 PROBLEM — C25.1 MALIGNANT NEOPLASM OF BODY OF PANCREAS (HCC): Status: ACTIVE | Noted: 2017-01-01

## 2017-05-19 NOTE — PROGRESS NOTES
Hematology/Oncology New patient    REASON FOR VISIT: Stage III pancreatic adenocarcinoma - unresectable. Here to establish care    HISTORY OF PRESENT ILLNESS: Ms. Denilson Webster is a 60 y.o. female with unresectable pancreatic cancer. She is here to discuss neoadjuvant chemotherapy. Oncologic history  She presented to the ED on 5/8/17 with severe abdominal pain. She had it for 4-5 months in the lower abdomen. Pain radiated to the back/shoulder for which she had been trying to go to Physical Therapy to manage. This provided little relief for her. Medications over the counter had also provided little relief. Symptoms associated with fatigue and  unintentional weight loss of approximately 20 pounds since December 2017    CT abdomen/pelvis completed in the ED revealed a 2.5cm pancreatic body lesion that involved the splenic artery and celiac artery. Dr. Alexandru Prince with GI performed an EUS 5/9/17 with pathology showing adenocarcinoma. LN biopsy was non diagnostic. Seen by Dr. Leilani Alford with Surgery and considered unresectable at the time. CT chest 5/11/17 showed a 4 mm lung nodule in the left upper lobe that was non specific. ROS: She has felt better. Her pain is markedly improved. Has regular BMs. No blood. Feeling good this morning. Tells me she slept well overnight. Appetite is good. She has gained a few pounds. Denies any nausea, vomiting, diarrhea, constipation. Ambulating around the room. Pain is well controlled on IV Dilaudid.    ECOG 1    Past Medical History:   Diagnosis Date    Allergic cough 10/5/2010    AR (allergic rhinitis)     cough    GERD (gastroesophageal reflux disease)     Tx FOR \"REALLY BAD HEARTBURN\" IN 2013    Nausea & vomiting     PMS (premenstrual syndrome) 12/26/14    IN PAST, WAS SEVERE; MENOPAUSE 10 YRS AGO, PT SAYS    Postmenopause, LMP 51yo, NO HRT 10/5/2010    S/P normal colonoscopy 2004 10/5/2010    Unspecified sleep apnea 12/26/14    USES CPAP       Past Surgical History: Procedure Laterality Date    DEXA BONE DENSITY STUDY AXIAL  2/2004    Normal; Dr Blanca Ellison (prev Gyn)   [id number] [facility]  61[address]    benign AUB; negative    ENDOSCOPY, COLON, DIAGNOSTIC  12/26/14    LAST COLONOSCOPY JAN 2014; PRIOR SCOPE 2004    HX RHINOPLASTY  1986    w/ chin implant (mentoplasty)    HX WISDOM TEETH EXTRACTION      MI OUTER EAR SURGERY PROC UNLISTED  1981    excision benign tumor behind right ear       No Known Allergies    Current Outpatient Prescriptions   Medication Sig Dispense Refill    nystatin-triamcinolone (MYCOLOG II) topical cream       morphine CR (MS CONTIN) 15 mg CR tablet Take 1 Tab by mouth every twelve (12) hours. Max Daily Amount: 30 mg. 20 Tab 0    oxyCODONE IR (OXY-IR) 15 mg immediate release tablet Take 1 Tab by mouth every six (6) hours as needed. Max Daily Amount: 60 mg. 20 Tab 0    senna-docusate (PERICOLACE) 8.6-50 mg per tablet Take 1 Tab by mouth nightly for 30 days. [address] [id number] mg-mg-million tab Take 1 Tab by mouth.  therapeutic multivitamin-minerals (THERAGRAN-M) tablet Take 1 Tab by mouth daily.  co-enzyme Q-10 (CO Q-10) 100 mg capsule Take 300 mg by mouth daily.  APPLE CIDER VINEGAR PO Take 450 mg by mouth daily.  CHOLECALCIFEROL, VITAMIN D3, (VITAMIN D3 PO) Take 5,000 Int'l Units by mouth daily.  MAGNESIUM PO Take 400 mg by mouth daily.  ascorbic acid (VITAMIN C) 500 mg tablet Take 1,000 mg by mouth daily.  vitamin E (AQUA GEMS) 400 unit capsule Take  by mouth daily.  ferrous sulfate (IRON) 325 mg (65 mg Iron) tablet Take  by mouth daily (before breakfast).  cyanocobalamin (VITAMIN B-12) 1,000 mcg TbSR Take  by mouth daily.  GLUC HCL/CSANA/GLY-AM-GLY,MX/C (COSAMIN DS PO) Take 1,500 mg by mouth daily.  aspirin delayed-release 81 mg tablet Take 81 mg by mouth daily.       HYDROcodone-acetaminophen (NORCO) 5-325 mg per tablet TAKE 1/2 TO 1 TABLET BY MOUTH TWICE A DAY AS NEEDED FOR PAIN  0    ondansetron (ZOFRAN ODT) 4 mg disintegrating tablet Take 4 mg by mouth every eight (8) hours as needed for Nausea.  MILK THISTLE, BULK, Take  by mouth.  VITAMIN K2 PO Take 45 mcg by mouth.  BUTTERBUR ROOT EXTRACT PO Take  by mouth daily.  loratadine (CLARITIN) 10 mg tablet Take 10 mg by mouth daily.  calcium-vitamin D (CALCIUM+D) 500 mg(1,250mg) -200 unit per tablet Take 1 Tab by mouth daily. 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 [address] Alcohol use No      Comment: very very rare    Drug use: No    Sexual activity: Not Currently     Other Topics Concern    Occupational Exposure No     down to 1 cup of cooffee qam; was up to 3-4 cups up unitl ~ the past few weeks    Weight Concern Yes     happy she has lost 20 lbs since strict diet and exercise    Special Diet Yes     since beginning of August went on strict diet of carb cuonting, low fat, protein and calorie counts; well balanced    Exercise Yes     swims a lot and since August started walking about 2-5 x a week x 45 minutes     Social History Narrative       Family History   Problem Relation Age of Onset    Stroke Mother     Heart Disease Mother     Heart Disease Father     Arthritis-rheumatoid Sister     Cancer Brother      skin cancer    HIV/AIDS Brother     Stroke Brother 79     TIA's    Heart Disease Brother 79     CABG    Stroke Maternal Grandmother     Heart Attack Maternal Grandfather     Heart Attack Paternal Grandmother     Heart Attack Paternal Grandfather     Colon Cancer Sister     Anesth Problems Neg Hx        ROS  As per the HPI, otherwise a comprehensive ROS is negative. ECOG PS is 1. Emotional well being addressed and patient is coping appropriately to new cancer diagnosis. Physical Examination:   Visit Vitals    /72    Pulse 77    Temp 97.4 °F (36.3 °C)    Resp 20    Ht 5' 7\" (1.702 m)    Wt 195 lb 12.8 oz (88.8 kg)    LMP 10/01/2005 (Approximate)    SpO2 98%    BMI 30.67 kg/m2     General appearance - alert, pleasant, no distress  Mental status - oriented to person, place, and time  Mouth - mucous membranes moist, pharynx normal without lesions  Neck - supple, no significant adenopathy  Chest - clear to auscultation, no wheezes, rales or rhonchi, symmetric air entry  Heart - normal rate, regular rhythm, normal S1, S2, no murmurs, rubs, clicks or gallops  Abdomen - soft, non-tender to light palpation, +bowel sounds  Extremities - peripheral pulses normal, no pedal edema      LABS  Lab Results   Component Value Date/Time    WBC 5.6 05/09/2017 03:22 AM    HGB 13.2 05/09/2017 03:22 AM    HCT 39.0 05/09/2017 03:22 AM    PLATELET 346 68/90/5485 03:22 AM    MCV 90.1 05/09/2017 03:22 AM    ABS. NEUTROPHILS 3.0 05/09/2017 03:22 AM     Lab Results   Component Value Date/Time    Sodium 140 05/09/2017 03:22 AM    Potassium 3.6 05/09/2017 03:22 AM    Chloride 106 05/09/2017 03:22 AM    CO2 27 05/09/2017 03:22 AM    Glucose 106 05/09/2017 03:22 AM    BUN 8 05/09/2017 03:22 AM    Creatinine 0.55 05/09/2017 03:22 AM    GFR est AA >60 05/09/2017 03:22 AM    GFR est non-AA >60 05/09/2017 03:22 AM    Calcium 8.6 05/09/2017 03:22 AM     Lab Results   Component Value Date/Time    AST (SGOT) 9 05/09/2017 03:22 AM    Alk. phosphatase 77 05/09/2017 03:22 AM    Protein, total 6.1 05/09/2017 03:22 AM    Albumin 3.6 05/09/2017 03:22 AM    Globulin 2.5 05/09/2017 03:22 AM    A-G Ratio 1.4 05/09/2017 03:22 AM         IMAGING    CT Chest 5/11/17  IMPRESSION:   1.  A 4 mm lung nodule in the left upper lobe is nonspecific. Follow-up chest CT  is recommended in 3 months. 2. A pancreatic mass is again demonstrated as seen on recent abdomen CT. CT 5/8/17  IMPRESSION:  1. No acute process on CT. 2. 2.5 cm pancreatic body lesion may represent malignant neoplasm and involves  the splenic artery and celiac artery. Consider nonemergent endoscopic ultrasound  and biopsy versus MRI abdomen. ASSESSMENT  Ms. Kaden Miller is a 60 y.o. female with newly diagnosed pancreatic adenocarcinoma on CT imaging 5/8/17, T3N0 (celiac involvement) and currently unresectable as per Tumor board recommendations. Questionable 4 mm left upper lobe nodule and no clear evidence of metastasis. She presented with pancreatitis and abdominal pain. Pancreatitis has subsided, pain is controlled on oral opiods. DISCUSSION/PLAN  1. Pancreatic adenocarcinoma Stage III unresectable. Was reviewed in Tumor Board an currently unresectable  Recommendations from Tumor board reviewed. Discussed starting with neoadjuvant chemotherapy with or without radiation (added after a few months)  Neoadjuvant chemotherapy may   Improve the selection of patients for whom resection will not offer a survival benefit , Increase rates of margin-negative resections, treat micrometastatic disease.     A systematic review of data from 13 studies of FOLFIRINOX for locally advanced pancreatic cancer (315 patients, most derived from retrospective analysis) concluded that the proportion of patients undergoing surgical resection ranged from 0 to 43 percent (pooled proportion 26 percent), and of those patients undergoing resection, a complete (R0) resection was reported in 76 percent . Side effects were reviewed that include but are not lilited to nausea, emesis, fatigue, sores, diarrhea, low counts, infections, myocardial infarction, neuropathy, liver damage. Patient is concerned about her quality of life on chemotherapy.  FOLFIRINOX may be difficult to tolerate but offers the best possibility of an R0-R1 resection. Reassured, that at anytime she could stop or switch chemotherapy if she in intolerant. She has a good understanding of the disease and states that she would not like to feel \"miserable \" for 6 months to a year, have the surgery and then have her cancer come back. She would rather not do anything, watch the cancer and manage the pain. Discussed that chemotherapy has actually been shown to improve the QOL compared to best supportive care. She is quite reluctant to start FOLFIRINOX. Alternatively, could start with palliative Gemcitabine and Abraxane which has shown comparable response rates and R0 resection rates albeit in smaller studies and has not been compared to FOLFIRINOX directly. It may be better tolerated in most patients. Side effects include low counts, fatigue, nausea, neuropathy. We also talked about NAC clinical trials in the area but the patient would not like to go out of [place]. She seems inclined to try palliative [place] Abraxane. She would like to think about it and call us in 2 days. Already has a PORT in place    2. Abdominal pain. Secondary to mass. Controlled on PO opioids. If gets progressive could consider Celiac plexus block    Spent  > 60 mins today, > 50% spent in counseling and care co ordination.  Met with NN    Signed by: Tk Green MD                     May 19, 2017

## 2017-05-19 NOTE — PROGRESS NOTES
[id number] [facility] Oncology at [facility]   Nurse Navigator Note      Patient Name: Ghada Villa  YOB: 1955          Patient seen prior to OV with Dr. Carmen Stanford. Patient's , Sveta Israel is present. Patient does not work outside of the home. Her  works---Fridays are flexible for him and they prefer any treatment on Fridays. No barriers at this time. Patient and spouse met provider while in the hospital.  Patient has a port in place. NN introduced self and role. Contact information provided. Described integrative supports in [facility] available to patient and loved ones, including massage, yoga, support groups, counseling, art and music therapies and relaxation/meditation therapy. Reviewed benefits of MyChart account and encouraged use for non-emergent and non-urgent communication. Patient and  asked clarifying questions regarding stage and treatment plan. They are considering the possibility of no treatment as well. They have a friend/colleague who was diagnosed with pancreatic cancer in the recent past who had a Whipple and chemotherapy. He did not tolerate therapy well and has transitioned to hospice care now. They reinforced importance of quality of life to them. Patient shared she has finds comfort in her relationship with God. She is not connected with a Taoist at this time. She is looking to connect with one soon. We discussed:  Patient directs care---if she chooses therapy and decides to stop there will still be support. Importance of understanding each patient's experience with therapy is different  Generally how patients tolerate therapy  Not to underestimate the importance of nutrition related to tolerating treatment and overall wellbeing. Importance of notifying team of side effects and support and prevention of side effects    Patient and spouse toured CRC and [facility] with NN. NN Plan:  NN to follow up with patient next week.   Patient agrees to contact our office with decision about treatment (Abraxane and Gemzar) early next week. Patient to see dietician in upcoming appointment. Patient to continue to maintain activities (walking and stretching exercises). Patient verbalized understanding of above and agrees to contact us with questions or concerns.

## 2017-05-22 NOTE — TELEPHONE ENCOUNTER
Called patient to advise/confirm upcoming appt with Dr. Ronak Mora on 5/24/17 at 1:30pm at [facility]. Pt confirmed. Also advised to please bring in your Drivers License and Insurance Card with you to appointment as well as any prescription pain medication in the original container with you to appt.

## 2017-05-22 NOTE — PROGRESS NOTES
[employer] [facility] [employer] Palliative Medicine Office  Nursing Note  [phone]  Fax [phone]  Name:  Yan Rice  YOB: 1955     Received outpatient Palliative Medicine referral from Dr. Rian Nix to see pt for symptom managment. Chart  reviewed. Pt has unresectable pancreatic cancer. Pt's most recent office visit with Dr. Jenna Pratt was on May 19. Per chart, pt informed Dr. Carmen Devlin office today that she has decided not to get chemotherapy.                                                                                                                                                                                                                                     ACP: AMD dated 11/15/13 is on file     Pt scheduled for Palliative Medicine clinic 5/24/17 at 1:30PM.  GAL FernandezN, RN  Clinical Referral Navigator

## 2017-05-22 NOTE — TELEPHONE ENCOUNTER
Pt came in to  script and she wanted to inform Dr. Charles De La Fuente she has decided not to get ANY Chemo therapy.

## 2017-05-24 NOTE — PROGRESS NOTES
Palliative Medicine Outpatient Services  [place]: [phone] ([phone]    Patient Name: Eli Blanco  YOB: 1955    Date of Current Visit: 05/24/17  Location of Current Visit:    [x] [facility] Office  [] [facility] Office  [] [facility] Office  [] Home  [] Other:      Date of Initial Visit: 5/12/17  Requesting Physician: González Swartz MD  Primary Care Physician: Ulysses Malhotra DO      SUMMARY:   Eli Blanco is a 60y.o. year old with a past history of pancreatic cancer, who was referred to Palliative Medicine by Dr. Franki Cannon for symptom management and supportive care. She was diagnosed in 5/2017 after months of progressive abdominal pain, fatigue and weight loss. She's decided against palliative chemotherapy. The patients social history includes: she's been  to [place] for 36 years. They have no children. She is a . She has a brother living in [place] and a sister in [place]. She has a personal relationship with God. She loves reading, biking, pool, documentaries, and history. Palliative Medicine is addressing the following current patient/family concerns: abdominal pain, nausea, constipation, poor appetite with early satiety, fatigue. PALLIATIVE DIAGNOSES:       ICD-10-CM ICD-9-CM    1. Abdominal pain, generalized R10.84 789.07    2. Nausea without vomiting R11.0 787.02    3. Drug-induced constipation K59.03 564.09      E980.5    4. Poor appetite R63.0 783.0    5. Neoplastic malignant related fatigue R53.0 780.79    6. Malignant neoplasm of body of pancreas (HCC) C25.1 157.1           PLAN:     Patient Instructions     Dear Eli Blanco ,    It was a pleasure seeing you in the Palliative Medicine Office today. This is what we talked about:     1. Your abdominal pain  -Your pain is well-controlled with the long-acting morphine 15-mg every 12 hours  and oxycodone 15-mg every 4 hours as needed for breakthrough pain  -You're taking about 3 oxycodone tabs daily.  Be sure to take this when your pain gets to a 4 to 5 rather than waiting as this gives you better overall pain control  -Start dexamethasone 2-mg twice daily in the morning and between 2:00 pm and 3:00 pm). -Dexamethasone will help with the inflammatory component of your pain    2. Your nausea  -This hasn't been a problem for you since you left the hospital  -Continue ondansetron 4-mg every 8 hours as needed  -I prescribed Phenergan 25-mg suppositories every 6 hours as needed as an additional anti-nausea medication    3. Your constipation  -Increase senna to 2 tabs at bedtime  -You may need to increase to 3 tabs to keep regular bowel movements    4. Your appetite  -Continue to eat small amounts of the foods that appeal to you (diet Cokes are OK!)  -You can drink Ensure or another nutritional shake if you want as a source of additional calories and nutrients  -The dexamethasone may improve your appetite    5. Your fatigue  -You are sleeping well at night  -You may find yourself having extra energy with the dexamethasone  -You received some printed information today about how to manage your energy    This is what you have shared with us about Advance Care Planning  Advance Care Planning 5/8/2017   Patient's Healthcare Decision Maker is: Named in scanned ACP document   Primary Decision Maker Name Prabhu To   Primary Decision Maker Phone Number [phone]   Primary Decision Maker Relationship to Patient Spouse   Secondary Decision Maker Name Raissa Colmenares   Secondary Decision Maker Relationship to Patient Sibling   Confirm Advance Directive Yes, on file         The Palliative Medicine Team is here to support you and your family. We will see you again in 2 weeks. Our Nurse Navigator Evelyn Aquino or Ruth Ann Verdugo will check in with you by phone before that time.      If there are any concerns before that time, such as medication questions, worsening symptoms or a need to see a physician for an urgent or emergent situation; please call [phone] (COPE). A physician is also on call after our normal business hours of 8am to 5pm.     In order to serve you better, please allow up to 48 hours for prescription refills to be processed. Certain medications may require more paperwork or a written prescription that you may need to  from the office. We appreciate you letting us know of any refill requests as soon as possible. We also would like you to sign up for [employer] as well. Sincerely,      Candice Hector MD and the Palliative Medicine Team      Counseling and Coordination: see plan       GOALS OF CARE / TREATMENT PREFERENCES:   [====Goals of Care====]  GOALS OF CARE:  Patient / health care proxy stated goals: maintain quality of life      TREATMENT PREFERENCES:   Code Status:  [x] Attempt Resuscitation       [] Do Not Attempt Resuscitation    Advance Care Planning:  Advance Care Planning 5/8/2017   Patient's Healthcare Decision Maker is: Named in scanned ACP document   Primary Decision Maker Name Deidra Rene   Primary Decision Maker Phone Number [phone]   Primary Decision Maker Relationship to Patient Spouse   Secondary Decision Maker Name [address] [place] [address]. Colmenares   Secondary Decision Maker Relationship to Patient Sibling   Confirm Advance Directive Yes, on file       Other:  (If patient appropriate for POST, consider using PALLPOST smart phrase here)    The palliative care team has discussed with patient / health care proxy about goals of care / treatment preferences for patient.  [====Goals of Care====]         PRESCRIPTIONS GIVEN:     Medications Ordered Today   Medications    promethazine (PHENERGAN) 25 mg suppository     Sig: Insert 1 Suppository into rectum every six (6) hours as needed for Nausea for up to 7 days. Dispense:  10 Suppository     Refill:  0           FOLLOW UP:   No future appointments.         PHYSICIANS INVOLVED IN CARE:   Patient Care Team:  Alfonso Forrester DO as PCP - General (Family Practice)  Loni Irvin RN as Nurse Navigator (Oncology)  Lesley Julien MD (Hematology and Oncology)  Harvey Carrera MD as Physician (Palliative Medicine)       HISTORY:   Nursing documentation from date of visit reviewed. Reviewed patient-completed ESAS and advance care planning form. Reviewed patient record in prescription monitoring program.    CHIEF COMPLAINT: abdominal pain, nausea, constipation, poor appetite    HPI/SUBJECTIVE:    The patient is: [x] Verbal / [] Nonverbal     She's been doing OK since she left the hospital last week. Her pain is much better with the morphine and the oxycodone. She's had no nausea but did feel a little queasy when she brushed her teeth this morning. She hasn't had to taker her nausea medicine. She's constipated. She's taking one senna twice daily. Her appetite is poor. Food is repulsive at times. She fills up sooner than she used to. She's noticed a big difference in her energy level. She and her  went on a day trip to [facility] [place] and it just wore her out. She's sleeping like a rock at night. Her mood is good. She's just taking it a day at a time. She saw Dr. Elmer Beal last Monday. She's decided not to get chemo.     Clinical Pain Assessment (nonverbal scale for nonverbal patients):   [++++ Clinical Pain Assessment++++]  [++++Pain Severity++++]: Pain: 2  [++++Pain Character++++]: aching abdominal pain; hurts to wear a bra but its not skin pain  [++++Pain Duration++++]: months with increasing severity  [++++Pain Effect++++]: affects energy  [++++Pain Factors++++]: unable to name provoking factors; morphine and oxycodone help  [++++Pain Frequency++++]: constant  [++++Pain Location++++]: lower back across abdomen and underneath both breasts  [++++ Clinical Pain Assessment++++]       FUNCTIONAL ASSESSMENT:     Palliative Performance Scale (PPS):  PPS: 70       PSYCHOSOCIAL/SPIRITUAL SCREENING:     Any spiritual / Uatsdin concerns:  [x] Yes /  [] No    Caregiver Burnout:  [] Yes /  [] No /  [x] No Caregiver Present      Anticipatory grief assessment:   [x] Normal  / [] Maladaptive       ESAS Anxiety: Anxiety: 0    ESAS Depression: Depression: 0       REVIEW OF SYSTEMS:     The following systems were [x] reviewed / [] unable to be reviewed  Systems: constitutional, ears/nose/mouth/throat, respiratory, gastrointestinal, genitourinary, musculoskeletal, integumentary, neurologic, psychiatric, endocrine. Positive findings noted below. Modified ESAS Completed by: provider   Fatigue: 4 Drowsiness: 3   Depression: 0 Pain: 2   Anxiety: 0 Nausea: 2   Anorexia: 8 Dyspnea: 0   Best Well-Bein Constipation: Yes   Other Problem (Comment): 0          PHYSICAL EXAM:     Wt Readings from Last 3 Encounters:   17 191 lb 1.6 oz (86.7 kg)   17 195 lb 12.8 oz (88.8 kg)   17 195 lb (88.5 kg)     Blood pressure 136/76, pulse 72, temperature 97.1 °F (36.2 °C), temperature source Oral, resp. rate 16, height 5' 7\" (1.702 m), weight 191 lb 1.6 oz (86.7 kg), last menstrual period 10/01/2005, SpO2 95 %.   Last bowel movement: See Nursing Note    Constitutional: sitting in chair, no acute distress  Eyes: pupils equal, anicteric  ENMT: no nasal discharge, moist mucous membranes  Cardiovascular: regular rhythm, distal pulses intact  Respiratory: breathing not labored, symmetric  Gastrointestinal: soft non-tender, +bowel sounds  Musculoskeletal: no deformity, no tenderness to palpation  Skin: warm, dry  Neurologic: following commands, moving all extremities  Psychiatric: full affect, no hallucinations  Other:       HISTORY:     Past Medical History:   Diagnosis Date    Allergic cough 10/5/2010    AR (allergic rhinitis)     cough    GERD (gastroesophageal reflux disease)     Tx FOR \"REALLY BAD HEARTBURN\" IN 2013    Nausea & vomiting     PMS (premenstrual syndrome) 13    IN PAST, WAS SEVERE; MENOPAUSE 10 YRS AGO, PT SAYS    Postmenopause, LMP 51yo, NO HRT 10/5/2010    S/P normal colonoscopy 2004 10/5/2010    Unspecified sleep apnea 12/26/14    USES CPAP      Past Surgical History:   Procedure Laterality Date    DEXA BONE DENSITY STUDY AXIAL  2/2004    Normal; Dr Lucia Ly (prev Gyn)    DILATION AND CURETTAGE  34yo    benign AUB; negative    ENDOSCOPY, COLON, DIAGNOSTIC  12/26/14    LAST COLONOSCOPY JAN 2014; PRIOR SCOPE 2004    HX RHINOPLASTY  1986    w/ chin implant (mentoplasty)    HX WISDOM TEETH EXTRACTION      NM OUTER EAR SURGERY PROC UNLISTED  1981    excision benign tumor behind right ear      Family History   Problem Relation Age of Onset    Stroke Mother     Heart Disease Mother     Heart Disease Father     Arthritis-rheumatoid Sister     Cancer Brother      skin cancer    HIV/AIDS Brother     Stroke Brother 79     TIA's    Heart Disease Brother 79     CABG    Stroke Maternal Grandmother     Heart Attack Maternal Grandfather     Heart Attack Paternal Grandmother     Heart Attack Paternal Grandfather     Colon Cancer Sister     Anesth Problems Neg Hx       History reviewed, no pertinent family history. Social History   Substance Use Topics    Smoking status: Former Smoker     Years: 6.00     Quit date: 9/27/1980    Smokeless tobacco: Never Used      Comment: used to smoke about 2 packs a week from college 1974 through . [address] 38 Alcohol use No      Comment: very very rare     No Known Allergies   Current Outpatient Prescriptions   Medication Sig    promethazine (PHENERGAN) 25 mg suppository Insert 1 Suppository into rectum every six (6) hours as needed for Nausea for up to 7 days.  oxyCODONE IR (OXY-IR) 15 mg immediate release tablet Take 1 Tab by mouth every six (6) hours as needed. Max Daily Amount: 60 mg.    nystatin-triamcinolone (MYCOLOG II) topical cream     morphine CR (MS CONTIN) 15 mg CR tablet Take 1 Tab by mouth every twelve (12) hours.  Max Daily Amount: 30 mg.    senna-docusate (PERICOLACE) 8.6-50 mg per tablet Take 1 Tab by mouth nightly for 30 days. [address] 596-40-47 mg-mg-million tab Take 1 Tab by mouth.  therapeutic multivitamin-minerals (THERAGRAN-M) tablet Take 1 Tab by mouth daily.  co-enzyme Q-10 (CO Q-10) 100 mg capsule Take 300 mg by mouth daily.  MILK THISTLE, BULK, Take  by mouth.  APPLE CIDER VINEGAR PO Take 450 mg by mouth daily.  MAGNESIUM PO Take 400 mg by mouth daily.  ascorbic acid (VITAMIN C) 500 mg tablet Take 1,000 mg by mouth daily.  ferrous sulfate (IRON) 325 mg (65 mg Iron) tablet Take  by mouth daily (before breakfast).  cyanocobalamin (VITAMIN B-12) 1,000 mcg TbSR Take  by mouth daily.  GLUC HCL/CSANA/GLY-AM-GLY,MX/C (COSAMIN DS PO) Take 1,500 mg by mouth daily.  aspirin delayed-release 81 mg tablet Take 81 mg by mouth daily.  ondansetron (ZOFRAN ODT) 4 mg disintegrating tablet Take 4 mg by mouth every eight (8) hours as needed for Nausea.  VITAMIN K2 PO Take 45 mcg by mouth.  BUTTERBUR ROOT EXTRACT PO Take  by mouth daily.  CHOLECALCIFEROL, VITAMIN D3, (VITAMIN D3 PO) Take 5,000 Int'l Units by mouth daily.  loratadine (CLARITIN) 10 mg tablet Take 10 mg by mouth daily.  calcium-vitamin D (CALCIUM+D) 500 mg(1,250mg) -200 unit per tablet Take 1 Tab by mouth daily.  vitamin E (AQUA GEMS) 400 unit capsule Take  by mouth daily. No current facility-administered medications for this visit. LAB DATA REVIEWED:     Lab Results   Component Value Date/Time    WBC 5.6 05/09/2017 03:22 AM    HGB 13.2 05/09/2017 03:22 AM    PLATELET 765 25/18/1481 03:22 AM     Lab Results   Component Value Date/Time    Sodium 140 05/09/2017 03:22 AM    Potassium 3.6 05/09/2017 03:22 AM    Chloride 106 05/09/2017 03:22 AM    CO2 27 05/09/2017 03:22 AM    BUN 8 05/09/2017 03:22 AM    Creatinine 0.55 05/09/2017 03:22 AM    Calcium 8.6 05/09/2017 03:22 AM      Lab Results   Component Value Date/Time    AST (SGOT) 9 05/09/2017 03:22 AM    Alk.  phosphatase 77 05/09/2017 03:22 AM Protein, total 6.1 05/09/2017 03:22 AM    Albumin 3.6 05/09/2017 03:22 AM    Globulin 2.5 05/09/2017 03:22 AM     No results found for: INR, PTMR, PTP, PT1, PT2, APTT   No results found for: IRON, FE, TIBC, IBCT, PSAT, FERR        CONTROLLED SUBSTANCES SAFETY ASSESSMENT (IF ON CONTROLLED SUBSTANCES):     Reviewed opioid safety handout:  [x] Yes   [] No  24 hour opioid dose >150mg morphine equivalent/day:  [] Yes   [x] No  Benzodiazepines:  [] Yes   [x] No  Sleep apnea:  [x] Yes   [] No  Urine Toxicology Testing within last 6 months:  [] Yes   [x] No  History of or new aberrant medication taking behaviors:  [] Yes   [] No            Total time:   Counseling / coordination time:   > 50% counseling / coordination?:

## 2017-05-24 NOTE — PROGRESS NOTES
Palliative Medicine Office Visit  Palliative Medicine Nurse Check In  [phone]    Patient Name: Lio Zendejas  YOB: 1955      Date of Office Visit: 5/24/2017     Patient states: \"  \"    From Check In Sheet (scanned in Media):  Has a medical provider talked with you about cardiopulmonary resuscitation (CPR)? [x] Yes   [] No   [] Unable to obtain    Nurse reminder to complete or update ACP FlowSheet:  Advance Care Planning 5/8/2017   Patient's Healthcare Decision Maker is: Named in scanned ACP document   Primary Decision Maker Name Danielle Erickson   Primary Decision Maker Phone Number [phone]   Primary Decision Maker Relationship to Patient Spouse   Secondary Decision Maker Name [facility]. [place]   Secondary Decision Maker Relationship to Patient Sibling   Confirm Advance Directive Yes, on file             Is there anything that we should know about you as a person in order to provide you the best care possible? C/o lack of apatite , and nausea ,and  having to take more Oxycodone. Have you been to the ER, urgent care clinic since your last visit? [x] Yes   [] No   [] Unable to obtain    Have you been hospitalized since your last visit? [x] Yes   [] No   [] Unable to obtain    Have you seen or consulted any other health care providers outside of the [address] [place] [address] since your last visit?    [] Yes   [x] No   [] Unable to obtain    Functional status (describe):         Last BM:  5/22/2017      accessed (date): 5/24/2017     Bottle review (for opioid pain medication):  Medication 1: Morphine 15 mg   Date filled:   Directions: 1 tab by mouth every 12 hours   # filled: 60  # left: 53  # pills taking per day:2  Last dose 5/24/2017     Medication 2: Oxycodone 15 mg  Date filled: 5/22/2017  Directions: 1 tab by mouth every 6 hours   # filled: 120  # left: 114  # pills taking per day:2-3  Last dose taken:5/24/2017

## 2017-05-24 NOTE — PROGRESS NOTES
Palliative Medicine  Nursing Note  [phone]  Fax [phone]        Clinic Office Visit  Patient Name: Anna Rogers  YOB: [birth year]      Advance Care Planning 5/8/2017   Patient's Healthcare Decision Maker is: Named in scanned ACP document   Primary Decision Maker Name Emerson Land   Primary Decision Maker Phone Number [phone]   Primary Decision Maker Relationship to Patient Spouse   Secondary Decision Maker Name [address] [facility]. Darrian   Secondary Decision Maker Relationship to Patient Sibling   Confirm Advance Directive Yes, on file     Mrs. Bennett states that her Zofran makes her \"throw up\" shortly after taking it. She does not want to take that anymore. Discussed with Dr. Herman Coy, Compazine to be ordered in place of Zofran. Call to [facility] pharmacy to place new orders per Dr. Herman Coy  for Compazine 10mg 1 tab every 6 hours as needed for nausea #60 with 1 refill, and Dexamethasone 2mg 1 tab in the AM and one between 2pm and 3pm #60 with 1 refill. Plan per Dr. Sutton Re:     1. Your abdominal pain  -Your pain is well-controlled with the long-acting morphine 15-mg every 12 hours and oxycodone 15-mg every 4 hours as needed for breakthrough pain  -You're taking about 3 oxycodone tabs daily. Be sure to take this when your pain gets to a 4 to 5 rather than waiting as this gives you better overall pain control  -Start dexamethasone 2-mg twice daily in the morning and between 2:00 pm and 3:00 pm). -Dexamethasone will help with the inflammatory component of your pain     2. Your nausea  -This hasn't been a problem for you since you left the hospital  -Continue ondansetron 4-mg every 8 hours as needed  -I prescribed Phenergan 25-mg suppositories every 6 hours as needed as an additional anti-nausea medication     3. Your constipation  -Increase senna to 2 tabs at bedtime  -You may need to increase to 3 tabs to keep regular bowel movements     4.  Your appetite  -Continue to eat small amounts of the foods that appeal to you (diet Cokes are OK!)  -You can drink Ensure or another nutritional shake if you want as a source of additional calories and nutrients  -The dexamethasone may improve your appetite     5. Your fatigue  -You are sleeping well at night  -You may find yourself having extra energy with the dexamethasone  -You received some printed information today about how to manage your energy    Provided New Patient Welcome Packet: Includes Opioid Safety, PM brochure and flyer, Magnet with Palliative Medicine Phone number. AVS reviewed with patient, verbalized an understanding of material discussed. Instructions given to patient to call the Palliative Medicine Office at [facility] ([phone]) for any questions or concerns 24 hours a day, 7 days a weeks. Follow up appointment scheduled for 3 weeks.       Sofia Verdin, BSN, RN, [facility]  Palliative Medicine

## 2017-05-24 NOTE — PATIENT INSTRUCTIONS
Dear Domingo Fernández ,    It was a pleasure seeing you in the Palliative Medicine Office today. This is what we talked about:     1. Your abdominal pain  -Your pain is well-controlled with the long-acting morphine 15-mg every 12 hours  and oxycodone 15-mg every 4 hours as needed for breakthrough pain  -You're taking about 3 oxycodone tabs daily. Be sure to take this when your pain gets to a 4 to 5 rather than waiting as this gives you better overall pain control  -Start dexamethasone 2-mg twice daily in the morning and between 2:00 pm and 3:00 pm). -Dexamethasone will help with the inflammatory component of your pain    2. Your nausea  -This hasn't been a problem for you since you left the hospital  -Continue ondansetron 4-mg every 8 hours as needed  -I prescribed Phenergan 25-mg suppositories every 6 hours as needed as an additional anti-nausea medication    3. Your constipation  -Increase senna to 2 tabs at bedtime  -You may need to increase to 3 tabs to keep regular bowel movements    4. Your appetite  -Continue to eat small amounts of the foods that appeal to you (diet Cokes are OK!)  -You can drink Ensure or another nutritional shake if you want as a source of additional calories and nutrients  -The dexamethasone may improve your appetite    5. Your fatigue  -You are sleeping well at night  -You may find yourself having extra energy with the dexamethasone  -You received some printed information today about how to manage your energy    This is what you have shared with us about Advance Care Planning  Advance Care Planning 5/8/2017   Patient's Healthcare Decision Maker is: Named in scanned ACP document   Primary Decision Maker Name Teresa Lozadaic   Primary Decision Maker Phone Number [phone]   Primary Decision Maker Relationship to Patient Spouse   Secondary Decision Maker Name West Colmenares   Secondary Decision Maker Relationship to Patient Sibling   Confirm Advance Directive Yes, on file         The Palliative Medicine Team is here to support you and your family. We will see you again in 2 weeks. Our Nurse Navigator Stacia Flatdavid or March Floyd will check in with you by phone before that time. If there are any concerns before that time, such as medication questions, worsening symptoms or a need to see a physician for an urgent or emergent situation; please call [phone] (COPE). A physician is also on call after our normal business hours of 8am to 5pm.     In order to serve you better, please allow up to 48 hours for prescription refills to be processed. Certain medications may require more paperwork or a written prescription that you may need to  from the office. We appreciate you letting us know of any refill requests as soon as possible. We also would like you to sign up for [employer] as well.     Sincerely,      Gris Rico MD and the Palliative Medicine Team

## 2017-05-24 NOTE — MR AVS SNAPSHOT
Visit Information Date & Time Provider Department Dept. Phone Encounter #  
 5/24/2017  1:30 PM Andreas Retana MD [address] [id number] Upcoming Health Maintenance Date Due Hepatitis C Screening 1955 Pneumococcal 19-64 Highest Risk (1 of 3 - PCV13) 1/20/1973 FOBT Q 1 YEAR AGE 50-75 1/20/2004 PAP AKA CERVICAL CYTOLOGY 10/12/2013 ZOSTER VACCINE AGE 60> 1/20/2014 INFLUENZA AGE 9 TO ADULT 8/1/2017 BREAST CANCER SCRN MAMMOGRAM 12/15/2018 DTaP/Tdap/Td series (2 - Td) 10/5/2020 Allergies as of 5/24/2017  Review Complete On: 5/24/2017 By: Malka Santos LPN No Known Allergies Current Immunizations  Reviewed on 9/12/2012 Name Date TDAP Vaccine 10/5/2010 Not reviewed this visit You Were Diagnosed With   
  
 Codes Comments Abdominal pain, generalized    -  Primary ICD-10-CM: R10.84 ICD-9-CM: 789.07 Nausea without vomiting     ICD-10-CM: R11.0 ICD-9-CM: 787.02   
 Drug-induced constipation     ICD-10-CM: K59.03 
ICD-9-CM: 564.09, E980.5 Poor appetite     ICD-10-CM: R63.0 ICD-9-CM: 783.0 Neoplastic malignant related fatigue     ICD-10-CM: R53.0 ICD-9-CM: 780.79 Malignant neoplasm of body of pancreas ([facility] [address]     ICD-10-CM: C25.1 ICD-9-CM: 157.1 Vitals BP Pulse Temp Resp Height(growth percentile) Weight(growth percentile) 136/76 (BP 1 Location: Left arm, BP Patient Position: Sitting) 72 97.1 °F (36.2 °C) (Oral) 16 5' 7\" (1.702 m) 191 lb 1.6 oz (86.7 kg) LMP SpO2 BMI OB Status Smoking Status 10/01/2005 (Approximate) 95% 29.93 kg/m2 Postmenopausal Former Smoker Vitals History BMI and BSA Data Body Mass Index Body Surface Area  
 29.93 kg/m 2 2.02 m 2 Preferred Pharmacy Pharmacy Name Phone [id number]/ [address]. [phone] Your Updated Medication List  
  
   
 This list is accurate as of: 5/24/17  2:33 PM.  Always use your most recent med list.  
  
  
  
  
 APPLE CIDER VINEGAR PO Take 450 mg by mouth daily. aspirin delayed-release 81 mg tablet Take 81 mg by mouth daily. Sandra Romo 639-01-37 mg-mg-million Tab Take 1 Tab by mouth. BUTTERBUR ROOT EXTRACT PO Take  by mouth daily. CALCIUM+D 500 mg(1,250mg) -200 unit per tablet Generic drug:  calcium-vitamin D Take 1 Tab by mouth daily. CLARITIN 10 mg tablet Generic drug:  loratadine Take 10 mg by mouth daily. CO Q-10 100 mg capsule Generic drug:  co-enzyme Q-10 Take 300 mg by mouth daily. COSAMIN DS PO Take 1,500 mg by mouth daily. Iron 325 mg (65 mg iron) tablet Generic drug:  ferrous sulfate Take  by mouth daily (before breakfast). MAGNESIUM PO Take 400 mg by mouth daily. MILK THISTLE (BULK) Take  by mouth.  
  
 morphine CR 15 mg CR tablet Commonly known as:  MS CONTIN Take 1 Tab by mouth every twelve (12) hours. Max Daily Amount: 30 mg.  
  
 nystatin-triamcinolone topical cream  
Commonly known as:  MYCOLOG II  
  
 ondansetron 4 mg disintegrating tablet Commonly known as:  ZOFRAN ODT Take 4 mg by mouth every eight (8) hours as needed for Nausea. oxyCODONE IR 15 mg immediate release tablet Commonly known as:  OXY-IR Take 1 Tab by mouth every six (6) hours as needed. Max Daily Amount: 60 mg.  
  
 promethazine 25 mg suppository Commonly known as:  PHENERGAN Insert 1 Suppository into rectum every six (6) hours as needed for Nausea for up to 7 days. senna-docusate 8.6-50 mg per tablet Commonly known as:  Ronita Led Take 1 Tab by mouth nightly for 30 days. therapeutic multivitamin-minerals tablet Commonly known as:  THERAGRAN-M Take 1 Tab by mouth daily. VITAMIN B-12 1,000 mcg tablet Generic drug:  cyanocobalamin ER Take  by mouth daily. VITAMIN C 500 mg tablet Generic drug:  ascorbic acid (vitamin C) Take 1,000 mg by mouth daily. VITAMIN D3 PO Take 5,000 Int'l Units by mouth daily. vitamin E 400 unit capsule Commonly known as:  [address] Take  by mouth daily. VITAMIN K2 PO Take 45 mcg by mouth. Prescriptions Sent to Pharmacy Refills  
 promethazine (PHENERGAN) 25 mg suppository 0 Sig: Insert 1 Suppository into rectum every six (6) hours as needed for Nausea for up to 7 days. Class: Normal  
 Pharmacy: [facility]/pharm[facility] #[id number] - [address] Ph #: [phone] Route: Rectal  
  
Patient Instructions Dear Shahrzad Salcido , It was a pleasure seeing you in the Palliative Medicine Office today. This is what we talked about:  
 
1. Your abdominal pain 
-Your pain is well-controlled with the long-acting morphine 15-mg every 12 hours  and oxycodone 15-mg every 4 hours as needed for breakthrough pain 
-You're taking about 3 oxycodone tabs daily. Be sure to take this when your pain gets to a 4 to 5 rather than waiting as this gives you better overall pain control 
-Start dexamethasone 2-mg twice daily in the morning and between 2:00 pm and 3:00 pm). -Dexamethasone will help with the inflammatory component of your pain 2. Your nausea 
-This hasn't been a problem for you since you left the hospital 
-Continue ondansetron 4-mg every 8 hours as needed 
-I prescribed Phenergan 25-mg suppositories every 6 hours as needed as an additional anti-nausea medication 3. Your constipation 
-Increase senna to 2 tabs at bedtime 
-You may need to increase to 3 tabs to keep regular bowel movements 4. Your appetite 
-Continue to eat small amounts of the foods that appeal to you (diet Cokes are OK!) 
-You can drink Ensure or another nutritional shake if you want as a source of additional calories and nutrients 
-The dexamethasone may improve your appetite 5. Your fatigue -You are sleeping well at night 
-You may find yourself having extra energy with the dexamethasone 
-You received some printed information today about how to manage your energy This is what you have shared with us about Advance Care Planning Advance Care Planning 5/8/2017 Patient's Healthcare Decision Maker is: Named in scanned ACP document Primary Decision Maker Name Juan Luis Garcia Primary Decision Maker Phone Number [phone] Primary Decision Maker Relationship to Patient Spouse Secondary Decision Maker Name Cynthia Fine Secondary Decision Maker Relationship to Patient Sibling Confirm Advance Directive Yes, on file The Palliative Medicine Team is here to support you and your family. We will see you again in 2 weeks. Our Nurse Navigator Alecia Dubose or Shelbie Perez will check in with you by phone before that time. If there are any concerns before that time, such as medication questions, worsening symptoms or a need to see a physician for an urgent or emergent situation; please call [phone] (COPE). A physician is also on call after our normal business hours of 8am to 5pm.  
 
In order to serve you better, please allow up to 48 hours for prescription refills to be processed. Certain medications may require more paperwork or a written prescription that you may need to  from the office. We appreciate you letting us know of any refill requests as soon as possible. We also would like you to sign up for [employer] as well. Sincerely, Jorge L Park MD and the Palliative Medicine Team 
 
  
Introducing [facility]! Dear Umesh Patricia: Thank you for requesting a [employer] account. Our records indicate that you already have an active [employer] account. You can access your account anytime at [email or web address]://[email or web address]. TASCET/[employer] Did you know that you can access your hospital and ER discharge instructions at any time in [employer]?   You can also review all of your test results from your hospital stay or ER visit. Additional Information If you have questions, please visit the Frequently Asked Questions section of the [employer] website at [email or web address] Remember, [employer] is NOT to be used for urgent needs. For medical emergencies, dial 911. Now available from your iPhone and Android! Please provide this summary of care documentation to your next provider. Your primary care clinician is listed as Vivian Ngo. If you have any questions after today's visit, please call [phone].

## 2017-05-30 NOTE — PROGRESS NOTES
Palliative Medicine  Nursing Note  [phone]  Fax [phone]        Clinic Office Visit  Patient Name: Lio Zendejas  YOB: [birth year]      Advance Care Planning 5/8/2017   Patient's Healthcare Decision Maker is: Named in scanned ACP document   Primary Decision Maker Name Danielle Erickson   Primary Decision Maker Phone Number [phone]   Primary Decision Maker Relationship to Patient Spouse   Secondary Decision Maker Name [facility]. Darrian   Secondary Decision Maker Relationship to Patient Sibling   Confirm Advance Directive Yes, on file     Return call placed to Mrs. Bennett. She stated that she had a bowel movement 3 days ago after using an enema, but none since. She is not uncomfortable, but wants to maintain frequency to avoid constipation pain. She is taking 3 senna each night and had one dose of Milk of Mag Monday morning with no result yet. Discussed that she could take Milk of mag tonight and again in the AM if no result. If still no result by Thursday AM, asked that she call Palliative again. Suggested she could start taking Miralax once daily to help soften her stool as she said her abdomen hurts after she has a BM because it is so hard to get out. She also stated she wants to avoid hemorrhoids. She verbalized understanding to call if no result within the next 24-36 hours. She inquired to her Oxycodone 15mg IR dose and how often she can take it. Reiterated that it is written for 1 tab every 6 hours. She states she has been taking it 4 times in 24 hours, but has needed it around every 4 hours during the day, between 9AM and 9PM and will then take one during the night. She feels she is just living to take her next dose and that she is \"just not feeling good. \"  She is tearful as she describes how she would gladly trade her home for the hospital so she can \"just feel better. \"   She has back to her lower back that she rates at 5/10 and the Oxycodone may bring it down to a 3 but it takes 90 min to get there and then wears off before 4 hours. She is continuing to take her MS Contin 15mg every 12 hours, she doesn't know that it helps, but she takes it. She had felt ok since her Discharge from the hospital 2 weeks ago, but that has gradually gone away and she feels she is almost back to the pain she had upon her admission. She started Dexamethasone 2mg twice daily on 5/24/17. She has noticed a slightly improved appetite and food tastes better, but she still intakes much less than she is accustom. Her nausea has been better, but she is nauseated at the moment. She feels she is more fatigued and notices she is more irritable, easily frustrated and tearful than prior to the steroids. She is tearful off and on during our conversation related to how poorly she feels and longs to \"just feel good. \"  Provided supportive listening and acknowledged her concerns. Discussed with Dr. Regina Chen who is able to see her in clinic at [facility] at 10:30 tomorrow 5/31/17. She suggested that Mrs. Bennett increase her MS Contin to 15mg every 8 hours and can increase her Oxycodone 15mg to 1-2 every 4 hours as needed for pain. Returned call to Mrs. Bennett and provided appt and medication recommendations. She agreed to the appt and verbalized understanding of the medication changes. She was appreciative of the appt and the ability to share her concerns. Encouraged her to call Palliative on call for any other questions or concerns. She verbalized understanding.     Karen Enriquez, GALN, RN, [facility]  Palliative Medicine

## 2017-05-31 NOTE — MR AVS SNAPSHOT
Visit Information Date & Time Provider Department Dept. Phone Encounter #  
 5/31/2017 10:30 AM Thierno Tavera MD Palliative [address] [id number] Your Appointments 6/14/2017  1:30 PM  
Follow Up with Lorena Lerma MD  
Palliative [place] ([address]) Appt Note: follow-up appt w/ Dr. Lozada Fill [address] [facility] [address] [place] [address]  
[phone]  
  
   
 [address] [place] [address] Upcoming Health Maintenance Date Due Hepatitis C Screening 1955 Pneumococcal 19-64 Highest Risk (1 of 3 - PCV13) 1/20/1973 FOBT Q 1 YEAR AGE 50-75 1/20/2004 PAP AKA CERVICAL CYTOLOGY 10/12/2013 ZOSTER VACCINE AGE 60> 1/20/2014 INFLUENZA AGE 9 TO ADULT 8/1/2017 BREAST CANCER SCRN MAMMOGRAM 12/15/2018 DTaP/Tdap/Td series (2 - Td) 10/5/2020 Allergies as of 5/31/2017  Review Complete On: 5/31/2017 By: Bernadette Donaldson LPN No Known Allergies Current Immunizations  Reviewed on 9/12/2012 Name Date TDAP Vaccine 10/5/2010 Not reviewed this visit You Were Diagnosed With   
  
 Codes Comments Malignant neoplasm of body of pancreas ([facility] [address]    -  Primary ICD-10-CM: C25.1 ICD-9-CM: 157.1 Cancer associated pain     ICD-10-CM: G89.3 ICD-9-CM: 338. 3 Vitals BP Pulse Temp Resp Height(growth percentile) Weight(growth percentile) 136/80 (BP 1 Location: Right arm, BP Patient Position: Sitting) 64 95.5 °F (35.3 °C) (Oral) 16 5' 7\" (1.702 m) 189 lb 9.6 oz (86 kg) LMP SpO2 BMI OB Status Smoking Status 10/01/2005 (Approximate) 90% 29.7 kg/m2 Postmenopausal Former Smoker Vitals History BMI and BSA Data Body Mass Index Body Surface Area  
 29.7 kg/m 2 2.02 m 2 Preferred Pharmacy [facility] Name Phone [id number] [facility] [address] [phone] Your Updated Medication List  
  
   
 This list is accurate as of: 5/31/17 11:59 AM.  Always use your most recent med list.  
  
  
  
  
 APPLE CIDER VINEGAR PO Take 450 mg by mouth daily. aspirin delayed-release 81 mg tablet Take 81 mg by mouth daily. Андрей Anne 892-07-75 mg-mg-million Tab Take 1 Tab by mouth.  
  
 bisacodyl 10 mg suppository Commonly known as:  DULCOLAX Insert 10 mg into rectum daily. BUTTERBUR ROOT EXTRACT PO Take  by mouth daily. CALCIUM+D 500 mg(1,250mg) -200 unit per tablet Generic drug:  calcium-vitamin D Take 1 Tab by mouth daily. CLARITIN 10 mg tablet Generic drug:  loratadine Take 10 mg by mouth daily. CO Q-10 100 mg capsule Generic drug:  co-enzyme Q-10 Take 300 mg by mouth daily. COSAMIN DS PO Take 1,500 mg by mouth daily. dexamethasone 2 mg tablet Commonly known as:  DECADRON Take one 2mg tab twice daily. One in the AM and one between 2pm and 3pm.  
  
 Iron 325 mg (65 mg iron) tablet Generic drug:  ferrous sulfate Take  by mouth daily (before breakfast). lactulose 10 gram/15 mL solution Commonly known as:  Tamiko Dayan Take 30 mL by mouth three (3) times daily. MAGNESIUM PO Take 400 mg by mouth daily. MILK THISTLE (BULK) Take  by mouth.  
  
 morphine CR 15 mg CR tablet Commonly known as:  MS CONTIN Take 1 Tab by mouth three (3) times daily. Max Daily Amount: 45 mg. Start taking on:  6/6/2017  
  
 nystatin-triamcinolone topical cream  
Commonly known as:  MYCOLOG II  
  
 ondansetron 4 mg disintegrating tablet Commonly known as:  ZOFRAN ODT Take 4 mg by mouth every eight (8) hours as needed for Nausea. oxyCODONE IR 15 mg immediate release tablet Commonly known as:  OXY-IR Take 1 Tab by mouth every four (4) hours as needed. Max Daily Amount: 90 mg. Start taking on:  6/6/2017 CHANG MILK OF MAGNESIA 400 mg/5 mL suspension Generic drug:  magnesium hydroxide Take 30 mL by mouth daily as needed for Constipation. prochlorperazine 10 mg tablet Commonly known as:  COMPAZINE Take 1 Tab by mouth every six (6) hours as needed for up to 7 days. promethazine 25 mg suppository Commonly known as:  PHENERGAN Insert 1 Suppository into rectum every six (6) hours as needed for Nausea for up to 7 days. senna-docusate 8.6-50 mg per tablet Commonly known as:  Nadege Espinoza Take 1 Tab by mouth nightly for 30 days. therapeutic multivitamin-minerals tablet Commonly known as:  THERAGRAN-M Take 1 Tab by mouth daily. VITAMIN B-12 1,000 mcg tablet Generic drug:  cyanocobalamin ER Take  by mouth daily. VITAMIN C 500 mg tablet Generic drug:  ascorbic acid (vitamin C) Take 1,000 mg by mouth daily. VITAMIN D3 PO Take 5,000 Int'l Units by mouth daily. vitamin E 400 unit capsule Commonly known as:  [address] Take  by mouth daily. VITAMIN K2 PO Take 45 mcg by mouth. Prescriptions Printed Refills  
 morphine CR (MS CONTIN) 15 mg CR tablet 0 Starting on: 6/6/2017 Sig: Take 1 Tab by mouth three (3) times daily. Max Daily Amount: 45 mg.  
 Class: Print Route: Oral  
 oxyCODONE IR (OXY-IR) 15 mg immediate release tablet 0 Starting on: 6/6/2017 Sig: Take 1 Tab by mouth every four (4) hours as needed. Max Daily Amount: 90 mg.  
 Class: Print Route: Oral  
  
Prescriptions Sent to Pharmacy Refills  
 bisacodyl (DULCOLAX) 10 mg suppository 2 Sig: Insert 10 mg into rectum daily. Class: Normal  
 Pharmacy: [facility]/pharmacy #[id number] - [address] Ph #: [phone] Route: Rectal  
 lactulose (CHRONULAC) 10 gram/15 mL solution 1 Sig: Take 30 mL by mouth three (3) times daily. Class: Normal  
 Pharmacy: [facility]/pharmacy #[id number]  [address] Ph #: [phone]  Route: Oral  
  
 To-Do List   
 05/31/2017 Imaging:  CT GUIDED CELIAC BLOCK   
  
 06/02/2017 11:00 AM  
  Appointment with Aly Tyler MD; [facility] CT MAIN 1 at [address] ([phone]) DIET RESTRICTIONS 1. For invasive and sedation procedures, patient should be NPO 8 hours prior to exam, unless instructed otherwise. 2. Take all medications not requiring food with sip of water, excluding blood thinners and diabetic medications. GENERAL INSTRUCTIONS 1. Bring a list of all medications you are currently taking, including over the counter medications. 2. Blood thinners and platelet inhibitors must be stopped 3-5 days prior to procedure. Consult your ordering physician prior to stopping them. 3. Check in at registration 1 hour before your appointment time unless you were instructed to do otherwise. 4. If sedation is required, you must have a  present before procedure is started. 5. The procedure may last 1-1 ½ hours. You may be required to stay 4-6 hours after the procedure for observation. 6. Bring any non [facility] facility films/images pertaining to the area of interest with you on the day of appointment. Referral Information Referral ID Referred By Referred To  
  
 [id number] Fredy VAUGHN Not Available Visits Status Start Date End Date 1 New Request 5/31/17 5/31/18 If your referral has a status of pending review or denied, additional information will be sent to support the outcome of this decision. Patient Instructions Dear Lio Zendejas , It was a pleasure seeing you in the Palliative Medicine Office today. This is what we talked about:  
 
1. Your abdominal pain 
-Your pain has increased over the past few days and it has been severe.  Per our instructions yesterday you took two tabs of Oxycodone 15 mg and this helped the pain and made you sleep well. 
- given that you have been taking Oxycodone 15 mg every 4 hours, we agreed on increasing your MS contin to 15 mg three times a day - Take Oxycodone 15 mg 1 tab and if your pain does not improve in about 2 hours you can take another 1 tab of 15 mg. Please call us if this regimen does not work. - We discussed Celiac plexus block and you are interested in it. We are placing a referral. 
 
2. Your nausea 
-This hasn't been a problem for you since you left the hospital 
-Continue ondansetron 4-mg every 8 hours as needed - Continue Phenergan 25-mg suppositories every 6 hours as needed as an additional anti-nausea medication 3. Your constipation - This has been a problem for you. You needed an enema to relieve yourself last Saturday. - Continue 3 tabs of Senna and Miralax. - I am prescribing Dulcolax suppositories which you can use daily. 
- You can also give yourself an enema today. - I have also prescribed Lactulose 20 mg three times a day until you have a bowel movement. 4. Your appetite - You ate well this morning as your pain was controlled. This also can be from the dexamethasone you are on. 
 
5. Goals of care - We talked about your disease, prognosis and your goals. - You are afraid that chemo will genesis you off your current quality of life. You are leaning towards NO chemo at all. We talked different scenarios, different options especially because you have a port. We are here to support you in any decision you make. - You want to see if the Celiac plexus block will help and then make a decision. 
- We discussed code status in detail and you have elected to have a protective DNR. 
- You signed a DDNR with us today. This is what you have shared with us about Advance Care Planning Advance Care Planning 5/8/2017 Patient's Healthcare Decision Maker is: Named in scanned ACP document Primary Decision Maker Name Jocelyne Crain Primary Decision Maker Phone Number [phone] Primary Decision Maker Relationship to Patient Spouse Secondary Decision Maker Name Tres Tsai Secondary Decision Maker Relationship to Patient Sibling Confirm Advance Directive Yes, on file The Palliative Medicine Team is here to support you and your family. We will see you again in 1 week. Our Nurse Navigator Nima Cortes or Aryan Thompson will check in with you by phone before that time. If there are any concerns before that time, such as medication questions, worsening symptoms or a need to see a physician for an urgent or emergent situation; please call [phone] (COPE). A physician is also on call after our normal business hours of 8am to 5pm.  
 
In order to serve you better, please allow up to 48 hours for prescription refills to be processed. Certain medications may require more paperwork or a written prescription that you may need to  from the office. We appreciate you letting us know of any refill requests as soon as possible. We also would like you to sign up for [employer] as well. Sincerely, Alexander Diaz MD and the Palliative Medicine Team 
 
  
Introducing [facility]! Dear Eduar Johnson: Thank you for requesting a [employer] account. Our records indicate that you already have an active [employer] account. You can access your account anytime at [email or web address] [email or web address] Did you know that you can access your hospital and ER discharge instructions at any time in [employer]? You can also review all of your test results from your hospital stay or ER visit. Additional Information If you have questions, please visit the Frequently Asked Questions section of the [employer] website at [email or web address] [email or web address]/K[employer]an [employer]/. Remember, [employer] is NOT to be used for urgent needs. For medical emergencies, dial 911. Now available from your iPhone and Android! Please provide this summary of care documentation to your next provider. Your primary care clinician is listed as Matt Ugalde.  If you have any questions after today's visit, please call [phone].

## 2017-05-31 NOTE — PATIENT INSTRUCTIONS
Dear Jesi Vazquez ,    It was a pleasure seeing you in the Palliative Medicine Office today. This is what we talked about:     1. Your abdominal pain  -Your pain has increased over the past few days and it has been severe. Per our instructions yesterday you took two tabs of Oxycodone 15 mg and this helped the pain and made you sleep well.  - given that you have been taking Oxycodone 15 mg every 4 hours, we agreed on increasing your MS contin to 15 mg three times a day  - Take Oxycodone 15 mg 1 tab and if your pain does not improve in about 2 hours you can take another 1 tab of 15 mg. Please call us if this regimen does not work. - We discussed Celiac plexus block and you are interested in it. We are placing a referral.    2. Your nausea  -This hasn't been a problem for you since you left the hospital  -Continue ondansetron 4-mg every 8 hours as needed  - Continue Phenergan 25-mg suppositories every 6 hours as needed as an additional anti-nausea medication    3. Your constipation  - This has been a problem for you. You needed an enema to relieve yourself last Saturday. - Continue 3 tabs of Senna and Miralax. - I am prescribing Dulcolax suppositories which you can use daily.  - You can also give yourself an enema today. - I have also prescribed Lactulose 20 mg three times a day until you have a bowel movement. 4. Your appetite  - You ate well this morning as your pain was controlled. This also can be from the dexamethasone you are on.    5. Goals of care  - We talked about your disease, prognosis and your goals. - You are afraid that chemo will genesis you off your current quality of life. You are leaning towards NO chemo at all. We talked different scenarios, different options especially because you have a port. We are here to support you in any decision you make.   - You want to see if the Celiac plexus block will help and then make a decision.  - We discussed code status in detail and you have elected to have a protective DNR.  - You signed a DDNR with us today. This is what you have shared with us about [place]. Planning 5/8/2017   Patient's Healthcare Decision Maker is: Named in scanned ACP document   Primary Decision Maker Name Júnior Szymanski   Primary Decision Maker Phone Number [phone]   Primary Decision Maker Relationship to Patient Spouse   Secondary Decision Maker Name [address] [facility]   Secondary Decision Maker Relationship to Patient Sibling   Confirm Advance Directive Yes, on file         The Palliative Medicine Team is here to support you and your family. We will see you again in 1 week. Our Nurse Navigator José Luis Roberts or Tee Kaur will check in with you by phone before that time. If there are any concerns before that time, such as medication questions, worsening symptoms or a need to see a physician for an urgent or emergent situation; please call [phone] (COPE). A physician is also on call after our normal business hours of 8am to 5pm.     In order to serve you better, please allow up to 48 hours for prescription refills to be processed. Certain medications may require more paperwork or a written prescription that you may need to  from the office. We appreciate you letting us know of any refill requests as soon as possible. We also would like you to sign up for [employer] as well.     Sincerely,      Thania Ching MD and the Palliative Medicine Team

## 2017-05-31 NOTE — PROGRESS NOTES
Palliative Medicine Outpatient Services  [place]: [phone]    Patient Name: Maximiliano Gomez  YOB: 1955    Date of Current Visit: 05/31/17  Location of Current Visit:    [x] [facility] Office  [] [facility] Office  [] [facility] Office  [] Home  [] Other:      Date of Initial Visit: 5/12/17  Requesting Physician: Parrish Araya MD  Primary Care Physician: Bennie Esqueda DO      SUMMARY:   Maximiliano Gomez is a 60y.o. year old with a past history of pancreatic cancer, who was referred to Palliative Medicine by Dr. Charles De La Fuente for symptom management and supportive care. She was diagnosed in 5/2017 after months of progressive abdominal pain, fatigue and weight loss. She's decided against palliative chemotherapy. The patients social history includes: she's been  to Hardeep Ely for 36 years. They have no children. She is a . She has a brother living in [place] and a sister in [place]. She has a personal relationship with God. She loves reading, biking, pool, documentaries, and history. Palliative Medicine is addressing the following current patient/family concerns: abdominal pain, nausea, constipation, poor appetite with early satiety, fatigue. Patient here for an urgent visit due to severe uncontrolled pain from yesterday. PALLIATIVE DIAGNOSES:       ICD-10-CM ICD-9-CM    1. Malignant neoplasm of body of pancreas (HCC) C25.1 157.1 magnesium hydroxide ([employer] MILK OF MAGNESIA) 400 mg/5 mL suspension      CT GUIDED CELIAC BLOCK      morphine CR (MS CONTIN) 15 mg CR tablet   2. Abdominal pain, generalized R10.84 789.07    3. Drug-induced constipation K59.03 564.09      E980.5    4. Nausea without vomiting R11.0 787.02    5. Cancer associated pain G89.3 338.3 magnesium hydroxide ([employer] MILK OF MAGNESIA) 400 mg/5 mL suspension      CT GUIDED CELIAC BLOCK          PLAN:     Patient Instructions     Dear Maximiliano Gomez ,    It was a pleasure seeing you in the Palliative Medicine Office today. This is what we talked about:     1. Your abdominal pain  -Your pain has increased over the past few days and it has been severe. Per our instructions yesterday you took two tabs of Oxycodone 15 mg and this helped the pain and made you sleep well.  - given that you have been taking Oxycodone 15 mg every 4 hours, we agreed on increasing your MS contin to 15 mg three times a day  - Take Oxycodone 15 mg 1 tab and if your pain does not improve in about 2 hours you can take another 1 tab of 15 mg. Please call us if this regimen does not work. - We discussed Celiac plexus block and you are interested in it. We are placing a referral.    2. Your nausea  -This hasn't been a problem for you since you left the hospital  -Continue ondansetron 4-mg every 8 hours as needed  - Continue Phenergan 25-mg suppositories every 6 hours as needed as an additional anti-nausea medication    3. Your constipation  - This has been a problem for you. You needed an enema to relieve yourself last Saturday. - Continue 3 tabs of Senna and Miralax. - I am prescribing Dulcolax suppositories which you can use daily.  - You can also give yourself an enema today. - I have also prescribed Lactulose 20 mg three times a day until you have a bowel movement. 4. Your appetite  - You ate well this morning as your pain was controlled. This also can be from the dexamethasone you are on.    5. Goals of care  - We talked about your disease, prognosis and your goals. - You are afraid that chemo will genesis you off your current quality of life. You are leaning towards NO chemo at all. We talked different scenarios, different options especially because you have a port. We are here to support you in any decision you make. - You want to see if the Celiac plexus block will help and then make a decision.  - We discussed code status in detail and you have elected to have a protective DNR.  - You signed a DDNR with us today.         This is what you have shared with us about Advance Care Planning  Advance Care Planning 5/8/2017   Patient's Healthcare Decision Maker is: Named in scanned ACP document   Primary Decision Maker Name Jodi Bob   Primary Decision Maker Phone Number [phone]   Primary Decision Maker Relationship to Patient Spouse   Secondary Decision Maker Name [facility]   Secondary Decision Maker Relationship to Patient Sibling   Confirm Advance Directive Yes, on file         The Palliative Medicine Team is here to support you and your family. We will see you again in 1 week. Our Nurse Navigator Meryle Dad or Lord Gonzalez will check in with you by phone before that time. If there are any concerns before that time, such as medication questions, worsening symptoms or a need to see a physician for an urgent or emergent situation; please call [phone] (COPE). A physician is also on call after our normal business hours of 8am to 5pm.     In order to serve you better, please allow up to 48 hours for prescription refills to be processed. Certain medications may require more paperwork or a written prescription that you may need to  from the office. We appreciate you letting us know of any refill requests as soon as possible. We also would like you to sign up for [employer] as well.     Sincerely,      Tony Lau MD and the Palliative Medicine Team      Counseling and Coordination: see plan       GOALS OF CARE / TREATMENT PREFERENCES:   [====Goals of Care====]  GOALS OF CARE:  Patient / health care proxy stated goals: maintain quality of life      TREATMENT PREFERENCES:   Code Status:  [] Attempt Resuscitation       [x] Do Not Attempt Resuscitation    Advance Care Planning:  Advance Care Planning 5/8/2017   Patient's Healthcare Decision Maker is: Named in scanned ACP document   Primary Decision Maker Name Jodi Bob   Primary Decision Maker Phone Number [phone]   Primary Decision Maker Relationship to Patient Spouse   Secondary Decision Maker Name Sidney Colmenares   Secondary Decision Maker Relationship to Patient Sibling   Confirm Advance Directive Yes, on file       Other:  (If patient appropriate for POST, consider using PALLPOST smart phrase here)    The palliative care team has discussed with patient / health care proxy about goals of care / treatment preferences for patient.  [====Goals of Care====]         PRESCRIPTIONS GIVEN:     Medications Ordered Today   Medications    morphine CR (MS CONTIN) 15 mg CR tablet     Sig: Take 1 Tab by mouth three (3) times daily. Max Daily Amount: 45 mg. Dispense:  90 Tab     Refill:  0    oxyCODONE IR (OXY-IR) 15 mg immediate release tablet     Sig: Take 1 Tab by mouth every four (4) hours as needed. Max Daily Amount: 90 mg. Dispense:  180 Tab     Refill:  0    bisacodyl (DULCOLAX) 10 mg suppository     Sig: Insert 10 mg into rectum daily. Dispense:  30 Suppository     Refill:  2    lactulose (CHRONULAC) 10 gram/15 mL solution     Sig: Take 30 mL by mouth three (3) times daily. Dispense:  480 mL     Refill:  1           FOLLOW UP:     Future Appointments  Date Time Provider Ben Borja   6/2/2017 11:00 AM [facility] CT MAIN 1 [facility]   6/14/2017 1:30 PM Carmela Rodriguez MD [address] [place] [address] IN CARE:   Patient Care Team:  Michelle Vallejo DO as PCP - General (Family Practice)  Reatha Cogan, RN as Nurse Navigator (Oncology)  Bambi Patel MD (Hematology and Oncology)  Carmela Rodriguez MD as Physician (Palliative Medicine)       HISTORY:   Nursing documentation from date of visit reviewed. Reviewed patient-completed ESAS and advance care planning form.   Reviewed patient record in prescription monitoring program.    CHIEF COMPLAINT: abdominal pain, nausea, constipation, poor appetite    HPI/SUBJECTIVE:    The patient is: [x] Verbal / [] Nonverbal     She was doing doing fairly over until the last 1 week and more so yesterday when her abdominal/ back pain was so severe. She has been taking Oxycodone 15 mg every 4 hours including once at night when she is woken up by the pain over the past few days. But her pain last night was so severe that one tab did not help. She took 2 tabs of Oxycodone 15 mg which helped take her pain away completely but she fell asleep soon after. She woke up this morning and felt no pain, took her MS contin and ate a very good breakfast.     She wants good pain control. She wants her pain to be 0-1/10 and no more. She wants to remain functional. We discussed options and she is agree able to increasing MS contin to three times a day and trying Oxycodone as prescribed. Constipation is a problem- no BM since Saturday after enema. Wants to try another today. She is passing gas and eating okay. Nausea is controlled as well. She has \"almost\" decided no chemo. We talked extensively about the reason behind the decision and she admits that she is afraid of side effects after seeing her sister who  of colon cancer, her niece who is currently receiving chemo for Ovarian ca. She is so miserable and patient does not want to end up like her.  talked about making peace with her decision after talking to his work mate who has been suffering from Pancreatic cancer s/p whipple, chemo, etc and currently in hospice. Patient says \"end is the same, why bother\". She also commented on not having any children to care for her and not wanting her  to leave work to be with her. After much discussion, she has decided to think about it after celiac plexus block.     Clinical Pain Assessment (nonverbal scale for nonverbal patients):   [++++ Clinical Pain Assessment++++]  [++++Pain Severity++++]: Pain: 1  [++++Pain Character++++]: aching abdominal pain; hurts to wear a bra but its not skin pain  [++++Pain Duration++++]: months with increasing severity  [++++Pain Effect++++]: affects energy  [++++Pain Factors++++]: unable to name provoking factors; morphine and oxycodone help  [++++Pain Frequency++++]: constant  [++++Pain Location++++]: lower back across abdomen and underneath both breasts  [++++ Clinical Pain Assessment++++]       FUNCTIONAL ASSESSMENT:     Palliative Performance Scale (PPS):  PPS: 70       PSYCHOSOCIAL/SPIRITUAL SCREENING:     Any spiritual / Christianity concerns:  [x] Yes /  [] No    Caregiver Burnout:  [] Yes /  [] No /  [x] No Caregiver Present      Anticipatory grief assessment:   [x] Normal  / [] Maladaptive       ESAS Anxiety: Anxiety: 3    ESAS Depression: Depression: 3       REVIEW OF SYSTEMS:     The following systems were [x] reviewed / [] unable to be reviewed  Systems: constitutional, ears/nose/mouth/throat, respiratory, gastrointestinal, genitourinary, musculoskeletal, integumentary, neurologic, psychiatric, endocrine. Positive findings noted below. Modified ESAS Completed by: provider   Fatigue: 8 Drowsiness: 8   Depression: 3 Pain: 1   Anxiety: 3 Nausea: 4   Anorexia: 3 Dyspnea: 0   Best Well-Bein Constipation: Yes   Other Problem (Comment): 0          PHYSICAL EXAM:     Wt Readings from Last 3 Encounters:   17 189 lb 9.6 oz (86 kg)   17 191 lb 1.6 oz (86.7 kg)   17 195 lb 12.8 oz (88.8 kg)     Blood pressure 136/80, pulse 64, temperature 95.5 °F (35.3 °C), temperature source Oral, resp. rate 16, height 5' 7\" (1.702 m), weight 189 lb 9.6 oz (86 kg), last menstrual period 10/01/2005, SpO2 90 %.   Last bowel movement: See Nursing Note    Constitutional: sitting in chair, no acute distress  Eyes: pupils equal, anicteric  ENMT: no nasal discharge, moist mucous membranes  Cardiovascular: regular rhythm, distal pulses intact  Respiratory: breathing not labored, symmetric  Gastrointestinal: soft non-tender, +bowel sounds  Musculoskeletal: no deformity, no tenderness to palpation  Skin: warm, dry  Neurologic: following commands, moving all extremities  Psychiatric: full affect, no hallucinations  Other:       HISTORY:     Past Medical History:   Diagnosis Date    Allergic cough 10/5/2010    AR (allergic rhinitis)     cough    GERD (gastroesophageal reflux disease)     Tx FOR \"REALLY BAD HEARTBURN\" IN 2013    Nausea & vomiting     PMS (premenstrual syndrome) 12/26/14    IN PAST, WAS SEVERE; MENOPAUSE 10 YRS AGO, PT SAYS    Postmenopause, LMP 49yo, NO HRT 10/5/2010    S/P normal colonoscopy 2004 10/5/2010    Unspecified sleep apnea 12/26/14    USES CPAP      Past Surgical History:   Procedure Laterality Date    DEXA BONE DENSITY STUDY AXIAL  2/2004    Normal; Dr Almaz Ferrari (prev Gyn)   [id number] [facility]  32yo    benign AUB; negative    ENDOSCOPY, COLON, DIAGNOSTIC  12/26/14    LAST COLONOSCOPY JAN 2014; PRIOR SCOPE 2004    HX RHINOPLASTY  1986    w/ chin implant (mentoplasty)    HX WISDOM TEETH EXTRACTION      UT OUTER EAR SURGERY PROC UNLISTED  1981    excision benign tumor behind right ear      Family History   Problem Relation Age of Onset   [address] [facility] Stroke Mother     Heart Disease Mother     Heart Disease Father     Arthritis-rheumatoid Sister     Cancer Brother      skin cancer    HIV/AIDS Brother     Stroke Brother 79     TIA's    Heart Disease Brother 79     CABG    Stroke Maternal Grandmother     Heart Attack Maternal Grandfather     Heart Attack Paternal Grandmother     Heart Attack Paternal Grandfather     Colon Cancer Sister     Anesth Problems Neg Hx       History reviewed, no pertinent family history.   Social History   Substance Use Topics    Smoking status: Former Smoker     Years: 6.00     Quit date: 9/27/1980    Smokeless tobacco: Never Used      Comment: used to smoke about 2 packs a week from college 1974 through . [address] Alcohol use No      Comment: very very rare     No Known Allergies   Current Outpatient Prescriptions   Medication Sig    magnesium hydroxide ([employer] MILK OF MAGNESIA) 400 mg/5 mL suspension Take 30 mL by mouth daily as needed for Constipation.  [START ON 6/6/2017] morphine CR (MS CONTIN) 15 mg CR tablet Take 1 Tab by mouth three (3) times daily. Max Daily Amount: 45 mg.    [START ON 6/6/2017] oxyCODONE IR (OXY-IR) 15 mg immediate release tablet Take 1 Tab by mouth every four (4) hours as needed. Max Daily Amount: 90 mg.    bisacodyl (DULCOLAX) 10 mg suppository Insert 10 mg into rectum daily.  lactulose (CHRONULAC) 10 gram/15 mL solution Take 30 mL by mouth three (3) times daily.  promethazine (PHENERGAN) 25 mg suppository Insert 1 Suppository into rectum every six (6) hours as needed for Nausea for up to 7 days.  dexamethasone (DECADRON) 2 mg tablet Take one 2mg tab twice daily. One in the AM and one between 2pm and 3pm.    prochlorperazine (COMPAZINE) 10 mg tablet Take 1 Tab by mouth every six (6) hours as needed for up to 7 days.  senna-docusate (PERICOLACE) 8.6-50 mg per tablet Take 1 Tab by mouth nightly for 30 days.  nystatin-triamcinolone (MYCOLOG II) topical cream     ondansetron (ZOFRAN ODT) 4 mg disintegrating tablet Take 4 mg by mouth every eight (8) hours as needed for Nausea.  therapeutic multivitamin-minerals (THERAGRAN-M) tablet Take 1 Tab by mouth daily.  MILK THISTLE, BULK, Take  by mouth.  BUTTERBUR ROOT EXTRACT PO Take  by mouth daily.  APPLE CIDER VINEGAR PO Take 450 mg by mouth daily.  CHOLECALCIFEROL, VITAMIN D3, (VITAMIN D3 PO) Take 5,000 Int'l Units by mouth daily.  MAGNESIUM PO Take 400 mg by mouth daily.  loratadine (CLARITIN) 10 mg tablet Take 10 mg by mouth daily.  ascorbic acid (VITAMIN C) 500 mg tablet Take 1,000 mg by mouth daily.  calcium-vitamin D (CALCIUM+D) 500 mg(1,250mg) -200 unit per tablet Take 1 Tab by mouth daily.  vitamin E (AQUA GEMS) 400 unit capsule Take  by mouth daily.  ferrous sulfate (IRON) 325 mg (65 mg Iron) tablet Take  by mouth daily (before breakfast).  cyanocobalamin (VITAMIN B-12) 1,000 mcg TbSR Take  by mouth daily.     GLUC HCL/CSANA/GLY-AM-GLY,MX/C (COSAMIN DS PO) Take 1,500 mg by mouth daily.  aspirin delayed-release 81 mg tablet Take 81 mg by mouth daily. No current facility-administered medications for this visit. LAB DATA REVIEWED:     Lab Results   Component Value Date/Time    WBC 5.6 05/09/2017 03:22 AM    HGB 13.2 05/09/2017 03:22 AM    PLATELET 226 28/87/2629 03:22 AM     Lab Results   Component Value Date/Time    Sodium 140 05/09/2017 03:22 AM    Potassium 3.6 05/09/2017 03:22 AM    Chloride 106 05/09/2017 03:22 AM    CO2 27 05/09/2017 03:22 AM    BUN 8 05/09/2017 03:22 AM    Creatinine 0.55 05/09/2017 03:22 AM    Calcium 8.6 05/09/2017 03:22 AM      Lab Results   Component Value Date/Time    AST (SGOT) 9 05/09/2017 03:22 AM    Alk.  phosphatase 77 05/09/2017 03:22 AM    Protein, total 6.1 05/09/2017 03:22 AM    Albumin 3.6 05/09/2017 03:22 AM    Globulin 2.5 05/09/2017 03:22 AM     No results found for: INR, PTMR, PTP, PT1, PT2, APTT   No results found for: IRON, FE, TIBC, IBCT, PSAT, FERR        CONTROLLED SUBSTANCES SAFETY ASSESSMENT (IF ON CONTROLLED SUBSTANCES):     Reviewed opioid safety handout:  [x] Yes   [] No  24 hour opioid dose >150mg morphine equivalent/day:  [] Yes   [x] No  Benzodiazepines:  [] Yes   [x] No  Sleep apnea:  [x] Yes   [] No  Urine Toxicology Testing within last 6 months:  [] Yes   [x] No  History of or new aberrant medication taking behaviors:  [] Yes   [] No            Total time:   Counseling / coordination time:   > 50% counseling / coordination?:

## 2017-05-31 NOTE — PROGRESS NOTES
Palliative Medicine Office Visit  Palliative Medicine Nurse Check In  [phone]    Patient Name: Lilibeth Cochran  YOB: 1955      Date of Office Visit: 5/31/2017     Patient states: \" I have discomfort from constipation \"    From Check In Sheet (scanned in Media):  Has a medical provider talked with you about cardiopulmonary resuscitation (CPR)? [x] Yes   [] No   [] Unable to obtain    Nurse reminder to complete or update ACP FlowSheet:  Advance Care Planning 5/8/2017   Patient's Healthcare Decision Maker is: Named in scanned ACP document   Primary Decision Maker Name Travis Dowd   Primary Decision Maker Phone Number [phone]   Primary Decision Maker Relationship to Patient Spouse   Secondary Decision Maker Name [address] [place] [address]. Darrian   Secondary Decision Maker Relationship to Patient Sibling   Confirm Advance Directive Yes, on file             Is there anything that we should know about you as a person in order to provide you the best care possible? C/o increased pain, phone call to office and increased medication has helped. Have you been to the ER, urgent care clinic since your last visit? [] Yes   [x] No   [] Unable to obtain    Have you been hospitalized since your last visit? [] Yes   [x] No   [] Unable to obtain    Have you seen or consulted any other health care providers outside of the [address] [place] [address] since your last visit?    [] Yes   [x] No   [] Unable to obtain    Functional status (describe):       Last BM: 5/27/2017    accessed (date): 5/31/2017     Bottle review (for opioid pain medication):  Medication 1: Morphine 15 mg   Date filled: 5/15/2017  Directions: 1 tab by mouth every 12 hours   # filled: 60  # left: 40  # pills taking per day: per patient 3   Last dose taken:5/31/2017 am     Medication 2: Oxycodone 15 mg  Date filled: 5/22/2017  Directions: 1 tab by mouth every 6 hours   # filled: 120  # left: 98  # pills taking per day:5  Last dose taken:5/30/2017

## 2017-06-02 NOTE — TELEPHONE ENCOUNTER
Call placed to patient to establish follow up visit to further discuss patient options. Message left to return call.

## 2017-06-02 NOTE — H&P
Radiology History and Physical    Patient: Deep Godwin 60 y.o. female       Chief Complaint: No chief complaint on file. History of Present Illness: pancreatic mass    History:    Past Medical History:   Diagnosis Date    Allergic cough 10/5/2010    AR (allergic rhinitis)     cough    GERD (gastroesophageal reflux disease)     Tx FOR \"REALLY BAD HEARTBURN\" IN 2013    Nausea & vomiting     PMS (premenstrual syndrome) 12/26/14    IN PAST, WAS SEVERE; MENOPAUSE 10 YRS AGO, PT SAYS    Postmenopause, LMP 49yo, NO HRT 10/5/2010    S/P normal colonoscopy 2004 10/5/2010    Unspecified sleep apnea 12/26/14    USES CPAP     Family History   Problem Relation Age of Onset    Stroke Mother     Heart Disease Mother     Heart Disease Father     Arthritis-rheumatoid Sister     Cancer Brother      skin cancer    HIV/AIDS Brother     Stroke Brother 79     TIA's    Heart Disease Brother 79     CABG    Stroke Maternal Grandmother     Heart Attack Maternal Grandfather     Heart Attack Paternal Grandmother     Heart Attack Paternal Grandfather     Colon Cancer Sister     Anesth Problems Neg Hx      Social History     Social History    Marital status:      Spouse name: N/A    Number of children: 0    Years of education: N/A     Occupational History    Homemaker; 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 [address] Alcohol use No      Comment: very very rare    Drug use: No    Sexual activity: Not Currently     Other Topics Concern    Occupational Exposure No     down to 1 cup of cooffee qam; was up to 3-4 cups up unitl ~ the past few weeks    Weight Concern Yes     happy she has lost 20 lbs since strict diet and exercise    Special Diet Yes     since beginning of August went on strict diet of carb cuonting, low fat, protein and calorie counts; well balanced    Exercise Yes     swims a lot and since August started walking about 2-5 x a week x 45 minutes     Social History Narrative       Allergies: Allergies   Allergen Reactions    Zofran [Ondansetron Hcl (Pf)] Other (comments)     Doesn't work per pt       Current Medications:  Current Outpatient Prescriptions   Medication Sig    magnesium hydroxide (CHANG MILK OF MAGNESIA) 400 mg/5 mL suspension Take 30 mL by mouth daily as needed for Constipation.  [START ON 6/6/2017] morphine CR (MS CONTIN) 15 mg CR tablet Take 1 Tab by mouth three (3) times daily. Max Daily Amount: 45 mg.    [START ON 6/6/2017] oxyCODONE IR (OXY-IR) 15 mg immediate release tablet Take 1 Tab by mouth every four (4) hours as needed. Max Daily Amount: 90 mg.    bisacodyl (DULCOLAX) 10 mg suppository Insert 10 mg into rectum daily.  lactulose (CHRONULAC) 10 gram/15 mL solution Take 30 mL by mouth three (3) times daily.  dexamethasone (DECADRON) 2 mg tablet Take one 2mg tab twice daily. One in the AM and one between 2pm and 3pm.    nystatin-triamcinolone (MYCOLOG II) topical cream     senna-docusate (PERICOLACE) 8.6-50 mg per tablet Take 1 Tab by mouth nightly for 30 days.  ondansetron (ZOFRAN ODT) 4 mg disintegrating tablet Take 4 mg by mouth every eight (8) hours as needed for Nausea.  therapeutic multivitamin-minerals (THERAGRAN-M) tablet Take 1 Tab by mouth daily.  MILK THISTLE, BULK, Take  by mouth.  BUTTERBUR ROOT EXTRACT PO Take  by mouth daily.  CHOLECALCIFEROL, VITAMIN D3, (VITAMIN D3 PO) Take 5,000 Int'l Units by mouth daily.  MAGNESIUM PO Take 400 mg by mouth daily.  loratadine (CLARITIN) 10 mg tablet Take 10 mg by mouth daily.  ascorbic acid (VITAMIN C) 500 mg tablet Take 1,000 mg by mouth daily.     calcium-vitamin D (CALCIUM+D) 500 mg(1,250mg) -200 unit per tablet Take 1 Tab by mouth daily.    vitamin E (AQUA GEMS) 400 unit capsule Take  by mouth daily.  ferrous sulfate (IRON) 325 mg (65 mg Iron) tablet Take  by mouth daily (before breakfast).  cyanocobalamin (VITAMIN B-12) 1,000 mcg TbSR Take  by mouth daily.  GLUC HCL/CSANA/GLY-AM-GLY,MX/C (COSAMIN DS PO) Take 1,500 mg by mouth daily.  APPLE CIDER VINEGAR PO Take 450 mg by mouth daily.  aspirin delayed-release 81 mg tablet Take 81 mg by mouth daily. Current Facility-Administered Medications   Medication Dose Route Frequency    0.9% sodium chloride infusion  25 mL/hr IntraVENous CONTINUOUS    midazolam (VERSED) injection 5 mg  5 mg IntraVENous RAD PRN    ondansetron (ZOFRAN) injection 4 mg  4 mg IntraVENous PRN    prochlorperazine (COMPAZINE) with saline injection 10 mg  10 mg IntraVENous Q6H PRN    fentaNYL citrate (PF) injection 100 mcg  100 mcg IntraVENous RAD PRN    sodium bicarbonate (NEUT) injection 5 mL  5 mL SubCUTAneous RAD ONCE    heparin (porcine) pf 100 unit/mL            Physical Exam:  Blood pressure 148/54, pulse (!) 58, temperature 96.8 °F (36 °C), resp. rate 18, height 5' 7\" (1.702 m), weight 83.9 kg (185 lb), last menstrual period 10/01/2005, SpO2 100 %. GENERAL: fatigued, cooperative, no distress, appears stated age, LUNG: clear to auscultation bilaterally, HEART: regular rate and rhythm, S1, S2 normal, no murmur, click, rub or gallop      Alerts:    Hospital Problems  Date Reviewed: 5/31/2017    None          Laboratory:    No results for input(s): HGB, HCT, WBC, PLT, INR, BUN, CREA, K, CRCLT, HGBEXT, HCTEXT, PLTEXT in the last 72 hours. No lab exists for component: PTT, PT, INREXT      Plan of Care/Planned Procedure:  Risks, benefits, and alternatives reviewed with patient and she agrees to proceed with the procedure.        Brown Hawk MD

## 2017-06-02 NOTE — DISCHARGE INSTRUCTIONS
A celiac plexus block procedure is an injection performed to reduce abdominal pain caused by cancer, chronic pancreatitis or adhesions. An injection of local anesthetic is used to block the celiac plexus nerves that transmit pain signals from your abdomen to your brain  What are the risks and side effects of a celiac plexus block? This procedure is safe. However, with any procedure there are risks, side effects and possibility of complications. The most common side effect is temporary pain or soreness at the injection site. Uncommon risks involve bleeding, infection, spinal block, epidural block, collapses lung and injection into blood vessels and surrounding organs. Fortunately, the serious side effects and complications are uncommon.

## 2017-06-02 NOTE — PROGRESS NOTES
Pt discharged via wheelchair escorted by , discharge instructions given and both pt and  verbalize understanding

## 2017-06-08 NOTE — TELEPHONE ENCOUNTER
Call to patient. LM - Advised to contact our office if we could be of any assistance. She is being followed by palliative.

## 2017-06-09 NOTE — PROGRESS NOTES
Palliative Medicine  Nursing Note  [phone]  Fax [phone]        Clinic Office Visit  Patient Name: Aileen Gómez  YOB: [birth year]      Advance Care Planning 5/8/2017   Patient's Healthcare Decision Maker is: Named in scanned ACP document   Primary Decision Maker Name Adryan Moore   Primary Decision Maker Phone Number [phone]   Primary Decision Maker Relationship to Patient Spouse   Secondary Decision Maker Name [facility]. Darrian   Secondary Decision Maker Relationship to Patient Sibling   Confirm Advance Directive Yes, on file       Patient called with concerns about having gas and generally feeling sick. Took first dose of Lactulose on Wednesday and another dose on yesterday, started with pain and eventually had some diarrhea. Explained to patient that this is probably the cause of these symptoms and to hold off on the Lactulose for a couple of days, she was told to take only one senna tonight but to make sure she moves her bowels on a regular basis, and has formed stools. She also stated that she has been eating a lot of green leafy vegetables like broccoli, green beans and diana greens. Instructed to cut back for the next couple of days on the fiber foods she has been eating but that once the pains and gas is improved she could add her vegetables back into her diet. Told her to call us again if you symptoms do not improve. 3:05 pm  Spoke with Ms. Cole Espino about the fact that the pharmacy said it was not time to fill her prescription of MS contin 15 mg 3 times a day. After checking with the pharmacy they did not realize it was a dose increase for the MS Contin and they are going to fill the prescription and notify Ms. Cole Espino. A message from our office was left on Ms. Bennett voice mail to let her know the pharmacy will notify her when the prescription is ready.         Luisana Razo, GALN, RN, OCN, [facility]  Palliative Medicine

## 2017-06-09 NOTE — TELEPHONE ENCOUNTER
Ryder Hoyos is calling regarding the MS Contin. Yohannes Knox He would like to speak to nurse regarding this medication. Looks like it might need an over ride. Please call Ryder Hoyos to get information.

## 2017-06-09 NOTE — TELEPHONE ENCOUNTER
Patient is calling regarding the morphine. She states that number to call to get prior authorization at [address] is [phone]. Please call patient to update. She will not have enough to last thru weekend.

## 2017-06-12 NOTE — TELEPHONE ENCOUNTER
Called patient to advise/confirm upcoming appt with Dr. Aries Batista on 6/14/17 at 1:30pm at [facility]. No answer so left vm asking pt to call office. Also advised to please bring in your Drivers License and Insurance Card with you to appointment as well as any prescription pain medication in the original  container with you to appt.

## 2017-06-13 NOTE — TELEPHONE ENCOUNTER
Called patient to advise/confirm upcoming appt with Dr. Argelia Adams on 6/14/17 at 1:30pm at [facility]. No answer so left vm.

## 2017-06-14 NOTE — PROGRESS NOTES
Palliative Medicine  Nursing Note  [phone]  Fax [phone]        Clinic Office Visit  Patient Name: Santiago Moya  YOB: [birth year]      Advance Care Planning 5/8/2017   Patient's Healthcare Decision Maker is: Named in scanned ACP document   Primary Decision Maker Name Jt Doss   Primary Decision Maker Phone Number [phone]   Primary Decision Maker Relationship to Patient Spouse   Secondary Decision Maker Name [facility]. Darrian   Secondary Decision Maker Relationship to Patient Sibling   Confirm Advance Directive Yes, on file         Plan per Dr. Thony Del Rio,    AVS reviewed with patient, verbalized an understanding of material discussed. Instructions given to patient to call the Palliative Medicine Office at [phone] for any questions or concerns 24 hours a day, 7 days a weeks. Follow up appointment scheduled for 4 weeks,  Dr. Thony Del Rio is booked so she will be coming back in 5 weeks. Her medication are good for now and she will call if she needs anything refilled. Nurse Navigator will provide a follow up phone call in about 3 weeks to check on her.       Viviana Varghese, GALN, RN, OCN, [facility]  Palliative Medicine

## 2017-06-14 NOTE — PROGRESS NOTES
Palliative Medicine Outpatient Services  [place]: [phone])    Patient Name: Sandra Bautista  YOB: 1955    Date of Current Visit: 06/14/17  Location of Current Visit:    [x] [facility] Office  [] [facility] Office  [] [facility] Office  [] Home  [] Other:      Date of Initial Visit: 5/12/17  Requesting Physician: Bambi Patel MD  Primary Care Physician: Michelle Vallejo DO      SUMMARY:   Sandra Bautista is a 60y.o. year old with a past history of pancreatic cancer, who was referred to Palliative Medicine by Dr. Patrica Wills for symptom management and supportive care. She was diagnosed in 5/2017 after months of progressive abdominal pain, fatigue and weight loss. She's decided against palliative chemotherapy. The patients social history includes: she's been  to [place] for 36 years. They have no children. She is a . She has a brother living in [place] and a sister in [place]. She has a personal relationship with God. She loves reading, biking, pool, documentaries, and history. Palliative Medicine is addressing the following current patient/family concerns: abdominal pain, nausea, constipation, poor appetite with early satiety, fatigue. PALLIATIVE DIAGNOSES:       ICD-10-CM ICD-9-CM    1. Abdominal pain, generalized R10.84 789.07    2. Drug-induced constipation K59.03 564.09      E980.5    3. Nausea without vomiting R11.0 787.02    4. Poor appetite R63.0 783.0    5. Malignant neoplasm of body of pancreas (HCC) C25.1 157.1           PLAN:     Patient Instructions     Dear Sandra Bautista ,    It was a pleasure seeing you in the Palliative Medicine Office today. This is what we talked about:     1.  Your abdominal pain  -You had the celiac plexus block done and this really helped with your pain!  - Continue long-acting morphine 15-mg every 12 hours  and oxycodone 15-mg every 4 hours as needed for breakthrough pain  -Continue dexamethasone 2-mg twice daily 1 tab in the morning and 1 tab between 2:00 pm and 3:00 pm)    2. Your nausea  -This hasn't been a problem for you since you left the hospital  -Continue ondansetron 4-mg every 8 hours as needed  -I prescribed Phenergan 25-mg suppositories every 6 hours as needed as an additional anti-nausea medication    3. Your constipation  -Increase senna to 2 tabs at bedtime  -You may need to increase to 3 tabs to keep regular bowel movements    4. Your appetite  -Continue to eat small amounts of the foods that appeal to you (diet Cokes are OK!)  -You can drink Ensure or another nutritional shake if you want as a source of additional calories and nutrients  -You have an appointment with Yamel Núñez, the [facility] dietician, this Friday    5. Your fatigue  -You are sleeping well at night  -Your energy is better with your improved pain control and with the dexamethasone    6. Your intestinal gas  -This occurs frequently and is bothersome to you  -I prescribed simethicone which you can take 4 times daily as need    7. Your cancer  -You asked about your life expectancy today  -You've been told that it may be 6 months to a year to a year and a half but you realize it will probably not be a year and a half.  -We talked about what's important to you now as you live your life as fully as possible  -You are considering taking some short trips to visit family and maybe to go to [place]  -Your quality of life is very important to you and you have a deep konrad which sustains you    This is what you have shared with us about [place]. Planning 5/8/2017   Patient's Healthcare Decision Maker is: Named in scanned ACP document   Primary Decision Maker Name Nirav Arevalo   Primary Decision Maker Phone Number [phone]   Primary Decision Maker Relationship to Patient Spouse   Secondary Decision Maker Name 5 [facility].  Darrian   Secondary Decision Maker Relationship to Patient Sibling   Confirm Advance Directive Yes, on file       The Palliative Medicine Team is here to support you and your family. We will see you again in 4 weeks. Our Nurse Navigator Charlie Galeano or Jose Alejandro Mejia will check in with you by phone before that time. If there are any concerns before that time, such as medication questions, worsening symptoms or a need to see a physician for an urgent or emergent situation; please call [phone] (COPE). A physician is also on call after our normal business hours of 8am to 5pm.     In order to serve you better, please allow up to 48 hours for prescription refills to be processed. Certain medications may require more paperwork or a written prescription that you may need to  from the office. We appreciate you letting us know of any refill requests as soon as possible. We also would like you to sign up for [employer] as well.     Sincerely,      Katherine Finney MD and the Palliative Medicine Team      Counseling and Coordination: see plan       GOALS OF CARE / TREATMENT PREFERENCES:   [====Goals of Care====]  GOALS OF CARE:  Patient / health care proxy stated goals: maintain quality of life      TREATMENT PREFERENCES:   Code Status:  [] Attempt Resuscitation       [x] Do Not Attempt Resuscitation    Advance Care Planning:  Advance Care Planning 6/14/2017   Patient's Healthcare Decision Maker is: Named in scanned ACP document   Primary Decision Maker Name Beverly Jhaveri   Primary Decision Maker Phone Number [phone]   Primary Decision Maker Relationship to Patient Spouse   Secondary Decision Maker Name -   Secondary Decision Maker Relationship to Patient -   Confirm Advance Directive Yes, on file   Does the patient have other document types Do Not Resuscitate       Other:  (If patient appropriate for POST, consider using PALLPOST smart phrase here)    The palliative care team has discussed with patient / health care proxy about goals of care / treatment preferences for patient.  [====Goals of Care====]         PRESCRIPTIONS GIVEN:     Medications Ordered Today   Medications    simethicone (MYLICON) 80 mg chewable tablet     Sig: Take 1 Tab by mouth every six (6) hours as needed for Flatulence. Dispense:  80 Tab     Refill:  3           FOLLOW UP:     Future Appointments  Date Time Provider Ben Borja   6/16/2017 1:00 PM Timbo Chopra RD, [facility] [address]   7/19/2017 1:30 PM Radhames Shabazz MD [address] [place] [address] IN CARE:   Patient Care Team:  Brianda Benitez DO as PCP - General (Family Practice)  Isidro Mayer RN as Nurse Navigator (Oncology)  Nikhil Oakley MD (Hematology and Oncology)  Radhames Shabazz MD as Physician (Palliative Medicine)       HISTORY:   Nursing documentation from date of visit reviewed. Reviewed patient-completed ESAS and advance care planning form. Reviewed patient record in prescription monitoring program.    CHIEF COMPLAINT: abdominal pain, nausea, constipation, poor appetite    HPI/SUBJECTIVE:    The patient is: [x] Verbal / [] Nonverbal     She had the celiac plexus block done which really helped with her pain. She continues to take her morphine three times a day and the oxycodone 4 to 6 times a day. She's eating but only certain foods. She sometimes has a lot of belching and passing gas with certain foods. She's had no anusea. She is meeting with the [facility] dietician this Friday. She's moving her bowels but needing to take lactulose sometimes. Her spirits are good. Her brother and sister-in-law visited recently from [place]. Her sister, who live sin [place], is here for a few weeks.         Clinical Pain Assessment (nonverbal scale for nonverbal patients):   [++++ Clinical Pain Assessment++++]  [++++Pain Severity++++]: Pain: 0  [++++Pain Character++++]: aching abdominal pain; hurts to wear a bra but its not skin pain  [++++Pain Duration++++]: months with increasing severity  [++++Pain Effect++++]: affects energy  [++++Pain Factors++++]: unable to name provoking factors; morphine and oxycodone help  [++++Pain Frequency++++]: constant  [++++Pain Location++++]: lower back across abdomen and underneath both breasts  [++++ Clinical Pain Assessment++++]       FUNCTIONAL ASSESSMENT:     Palliative Performance Scale (PPS):  PPS: 80       PSYCHOSOCIAL/SPIRITUAL SCREENING:     Any spiritual / Temple concerns:  [x] Yes /  [] No    Caregiver Burnout:  [] Yes /  [] No /  [x] No Caregiver Present      Anticipatory grief assessment:   [x] Normal  / [] Maladaptive       ESAS Anxiety: Anxiety: 0    ESAS Depression: Depression: 0       REVIEW OF SYSTEMS:     The following systems were [x] reviewed / [] unable to be reviewed  Systems: constitutional, ears/nose/mouth/throat, respiratory, gastrointestinal, genitourinary, musculoskeletal, integumentary, neurologic, psychiatric, endocrine. Positive findings noted below. Modified ESAS Completed by: provider   Fatigue: 0 Drowsiness: 0   Depression: 0 Pain: 0   Anxiety: 0 Nausea: 0   Anorexia: 0 Dyspnea: 0   Best Well-Bein Constipation: No   Other Problem (Comment): 1          PHYSICAL EXAM:     Wt Readings from Last 3 Encounters:   17 189 lb (85.7 kg)   17 185 lb (83.9 kg)   17 189 lb 9.6 oz (86 kg)     Blood pressure 128/68, pulse (!) 54, temperature 96.8 °F (36 °C), temperature source Oral, resp. rate 16, height 5' (1.524 m), weight 189 lb (85.7 kg), last menstrual period 10/01/2005, SpO2 99 %.   Last bowel movement: See Nursing Note    Constitutional: sitting in chair, no acute distress  Eyes: pupils equal, anicteric  ENMT: no nasal discharge, moist mucous membranes  Cardiovascular: regular rhythm, distal pulses intact  Respiratory: breathing not labored, symmetric  Gastrointestinal: soft non-tender, +bowel sounds  Musculoskeletal: no deformity, no tenderness to palpation  Skin: warm, dry  Neurologic: following commands, moving all extremities  Psychiatric: full affect, no hallucinations  Other:       HISTORY: Past Medical History:   Diagnosis Date    Allergic cough 10/5/2010    AR (allergic rhinitis)     cough    GERD (gastroesophageal reflux disease)     Tx FOR \"REALLY BAD HEARTBURN\" IN 2013    Nausea & vomiting     PMS (premenstrual syndrome) 12/26/14    IN PAST, WAS SEVERE; MENOPAUSE 10 YRS AGO, PT SAYS    Postmenopause, LMP 49yo, NO HRT 10/5/2010    S/P normal colonoscopy 2004 10/5/2010    Unspecified sleep apnea 12/26/14    USES CPAP      Past Surgical History:   Procedure Laterality Date    DEXA BONE DENSITY STUDY AXIAL  2/2004    Normal; Dr Nakia Rocha (prev Gyn)   [address] [facility]  34yo    benign AUB; negative    ENDOSCOPY, COLON, DIAGNOSTIC  12/26/14    LAST COLONOSCOPY JAN 2014; PRIOR SCOPE 2004    HX RHINOPLASTY  1986    w/ chin implant (mentoplasty)    HX WISDOM TEETH EXTRACTION      [place] OUTER EAR SURGERY PROC UNLISTED  1981    excision benign tumor behind right ear      Family History   Problem Relation Age of Onset   [de-identified] Stroke Mother     Heart Disease Mother     Heart Disease Father     Arthritis-rheumatoid Sister     Cancer Brother      skin cancer    HIV/AIDS Brother     Stroke Brother 79     TIA's    Heart Disease Brother 79     CABG    Stroke Maternal Grandmother     Heart Attack Maternal Grandfather     Heart Attack Paternal Grandmother     Heart Attack Paternal Grandfather     Colon Cancer Sister     Anesth Problems Neg Hx       History reviewed, no pertinent family history.   Social History   Substance Use Topics    Smoking status: Former Smoker     Years: 6.00     Quit date: 9/27/1980    Smokeless tobacco: Never Used      Comment: used to smoke about 2 packs a week from college 1974 through . [address] Alcohol use No      Comment: very very rare     Allergies   Allergen Reactions    Zofran [Ondansetron Hcl (Pf)] Other (comments)     Doesn't work per pt      Current Outpatient Prescriptions   Medication Sig    simethicone (MYLICON) 80 mg chewable tablet Take 1 Tab by mouth every six (6) hours as needed for Flatulence.  morphine CR (MS CONTIN) 15 mg CR tablet Take 1 Tab by mouth three (3) times daily. Max Daily Amount: 45 mg.    oxyCODONE IR (OXY-IR) 15 mg immediate release tablet Take 1 Tab by mouth every four (4) hours as needed. Max Daily Amount: 90 mg.    bisacodyl (DULCOLAX) 10 mg suppository Insert 10 mg into rectum daily.  lactulose (CHRONULAC) 10 gram/15 mL solution Take 30 mL by mouth three (3) times daily.  dexamethasone (DECADRON) 2 mg tablet Take one 2mg tab twice daily. One in the AM and one between 2pm and 3pm.    MAGNESIUM PO Take 400 mg by mouth daily.  magnesium hydroxide (CHANG MILK OF MAGNESIA) 400 mg/5 mL suspension Take 30 mL by mouth daily as needed for Constipation.  nystatin-triamcinolone (MYCOLOG II) topical cream     ondansetron (ZOFRAN ODT) 4 mg disintegrating tablet Take 4 mg by mouth every eight (8) hours as needed for Nausea.  therapeutic multivitamin-minerals (THERAGRAN-M) tablet Take 1 Tab by mouth daily.  MILK THISTLE, BULK, Take  by mouth.  BUTTERBUR ROOT EXTRACT PO Take  by mouth daily.  APPLE CIDER VINEGAR PO Take 450 mg by mouth daily.  CHOLECALCIFEROL, VITAMIN D3, (VITAMIN D3 PO) Take 5,000 Int'l Units by mouth daily.  loratadine (CLARITIN) 10 mg tablet Take 10 mg by mouth daily.  ascorbic acid (VITAMIN C) 500 mg tablet Take 1,000 mg by mouth daily.  calcium-vitamin D (CALCIUM+D) 500 mg(1,250mg) -200 unit per tablet Take 1 Tab by mouth daily.  vitamin E (AQUA GEMS) 400 unit capsule Take  by mouth daily.  ferrous sulfate (IRON) 325 mg (65 mg Iron) tablet Take  by mouth daily (before breakfast).  cyanocobalamin (VITAMIN B-12) 1,000 mcg TbSR Take  by mouth daily.  GLUC HCL/CSANA/GLY-AM-GLY,MX/C (COSAMIN DS PO) Take 1,500 mg by mouth daily.  aspirin delayed-release 81 mg tablet Take 81 mg by mouth daily. No current facility-administered medications for this visit.            LAB DATA REVIEWED:     Lab Results   Component Value Date/Time    WBC 5.6 05/09/2017 03:22 AM    HGB 13.2 05/09/2017 03:22 AM    PLATELET 831 47/75/5573 03:22 AM     Lab Results   Component Value Date/Time    Sodium 140 05/09/2017 03:22 AM    Potassium 3.6 05/09/2017 03:22 AM    Chloride 106 05/09/2017 03:22 AM    CO2 27 05/09/2017 03:22 AM    BUN 8 05/09/2017 03:22 AM    Creatinine 0.55 05/09/2017 03:22 AM    Calcium 8.6 05/09/2017 03:22 AM      Lab Results   Component Value Date/Time    AST (SGOT) 9 05/09/2017 03:22 AM    Alk.  phosphatase 77 05/09/2017 03:22 AM    Protein, total 6.1 05/09/2017 03:22 AM    Albumin 3.6 05/09/2017 03:22 AM    Globulin 2.5 05/09/2017 03:22 AM     No results found for: INR, PTMR, PTP, PT1, PT2, APTT   No results found for: IRON, FE, TIBC, IBCT, PSAT, FERR        CONTROLLED SUBSTANCES SAFETY ASSESSMENT (IF ON CONTROLLED SUBSTANCES):     Reviewed opioid safety handout:  [x] Yes   [] No  24 hour opioid dose >150mg morphine equivalent/day:  [] Yes   [x] No  Benzodiazepines:  [] Yes   [x] No  Sleep apnea:  [x] Yes   [] No  Urine Toxicology Testing within last 6 months:  [] Yes   [x] No  History of or new aberrant medication taking behaviors:  [] Yes   [] No            Total time:   Counseling / coordination time:   > 50% counseling / coordination?:

## 2017-06-14 NOTE — MR AVS SNAPSHOT
Visit Information Date & Time Provider Department Dept. Phone Encounter #  
 6/14/2017  1:30 PM Alejandra Wolf MD [facility] [address] [id number] Your Appointments 6/16/2017  1:00 PM  
Follow Up with Zara Worthington RD, [facility] RUST[facility] Oncology at [facility]) Appt Note: ok per Mariano [place] [address] [place] [address] [address]  
[phone]  
  
   
 [address] [address] Upcoming Health Maintenance Date Due Hepatitis C Screening 1955 Pneumococcal 19-64 Highest Risk (1 of 3 - PCV13) 1/20/1973 FOBT Q 1 YEAR AGE 50-75 1/20/2004 PAP AKA CERVICAL CYTOLOGY 10/12/2013 ZOSTER VACCINE AGE 60> 1/20/2014 INFLUENZA AGE 9 TO ADULT 8/1/2017 BREAST CANCER SCRN MAMMOGRAM 12/15/2018 DTaP/Tdap/Td series (2 - Td) 10/5/2020 Allergies as of 6/14/2017  Review Complete On: 6/14/2017 By: Cami Aj LPN Severity Noted Reaction Type Reactions Zofran [Ondansetron Hcl (Pf)]  06/02/2017    Other (comments) Doesn't work per pt Current Immunizations  Reviewed on 9/12/2012 Name Date TDAP Vaccine 10/5/2010 Not reviewed this visit Vitals BP Pulse Temp Resp Height(growth percentile) Weight(growth percentile) 128/68 (BP 1 Location: Left arm, BP Patient Position: Sitting) (!) 54 96.8 °F (36 °C) (Oral) 16 5' (1.524 m) 189 lb (85.7 kg) LMP SpO2 BMI OB Status Smoking Status 10/01/2005 (Approximate) 99% 36.91 kg/m2 Postmenopausal Former Smoker Vitals History BMI and BSA Data Body Mass Index Body Surface Area  
 36.91 kg/m 2 1.9 m 2 Preferred Pharmacy Pharmacy Name Phone [id number] [place] [address]/ [address] [place] [phone] Your Updated Medication List  
  
   
This list is accurate as of: 6/14/17  2:50 PM.  Always use your most recent med list.  
  
  
  
  
 APPLE CIDER VINEGAR PO  
 Take 450 mg by mouth daily. aspirin delayed-release 81 mg tablet Take 81 mg by mouth daily. bisacodyl 10 mg suppository Commonly known as:  DULCOLAX Insert 10 mg into rectum daily. BUTTERBUR ROOT EXTRACT PO Take  by mouth daily. CALCIUM+D 500 mg(1,250mg) -200 unit per tablet Generic drug:  calcium-vitamin D Take 1 Tab by mouth daily. CLARITIN 10 mg tablet Generic drug:  loratadine Take 10 mg by mouth daily. COSAMIN DS PO Take 1,500 mg by mouth daily. dexamethasone 2 mg tablet Commonly known as:  DECADRON Take one 2mg tab twice daily. One in the AM and one between 2pm and 3pm.  
  
 Iron 325 mg (65 mg iron) tablet Generic drug:  ferrous sulfate Take  by mouth daily (before breakfast). lactulose 10 gram/15 mL solution Commonly known as:  Dwana Ken Take 30 mL by mouth three (3) times daily. MAGNESIUM PO Take 400 mg by mouth daily. MILK THISTLE (BULK) Take  by mouth.  
  
 morphine CR 15 mg CR tablet Commonly known as:  MS CONTIN Take 1 Tab by mouth three (3) times daily. Max Daily Amount: 45 mg.  
  
 nystatin-triamcinolone topical cream  
Commonly known as:  MYCOLOG II  
  
 ondansetron 4 mg disintegrating tablet Commonly known as:  ZOFRAN ODT Take 4 mg by mouth every eight (8) hours as needed for Nausea. oxyCODONE IR 15 mg immediate release tablet Commonly known as:  OXY-IR Take 1 Tab by mouth every four (4) hours as needed. Max Daily Amount: 90 mg. CHANG MILK OF MAGNESIA 400 mg/5 mL suspension Generic drug:  magnesium hydroxide Take 30 mL by mouth daily as needed for Constipation. simethicone 80 mg chewable tablet Commonly known as:  Yajaira Plane Take 1 Tab by mouth every six (6) hours as needed for Flatulence. therapeutic multivitamin-minerals tablet Commonly known as:  THERAGRAN-M Take 1 Tab by mouth daily. VITAMIN B-12 1,000 mcg tablet Generic drug:  cyanocobalamin ER Take  by mouth daily. VITAMIN C 500 mg tablet Generic drug:  ascorbic acid (vitamin C) Take 1,000 mg by mouth daily. VITAMIN D3 PO Take 5,000 Int'l Units by mouth daily. vitamin E 400 unit capsule Commonly known as:  [address] Take  by mouth daily. Prescriptions Sent to Pharmacy Refills  
 simethicone (MYLICON) 80 mg chewable tablet 3 Sig: Take 1 Tab by mouth every six (6) hours as needed for Flatulence. Class: Normal  
 Pharmacy: [facility]/pharmacy #[id number] - [address] Ph #: [phone] Route: Oral  
  
Patient Instructions Dear Yohannes Whalen , It was a pleasure seeing you in the Palliative Medicine Office today. This is what we talked about:  
 
1. Your abdominal pain 
-You had the celiac plexus block done and this really helped with your pain! 
- Continue long-acting morphine 15-mg every 12 hours  and oxycodone 15-mg every 4 hours as needed for breakthrough pain 
-Continue dexamethasone 2-mg twice daily 1 tab in the morning and 1 tab between 2:00 pm and 3:00 pm) 2. Your nausea 
-This hasn't been a problem for you since you left the hospital 
-Continue ondansetron 4-mg every 8 hours as needed 
-I prescribed Phenergan 25-mg suppositories every 6 hours as needed as an additional anti-nausea medication 3. Your constipation 
-Increase senna to 2 tabs at bedtime 
-You may need to increase to 3 tabs to keep regular bowel movements 4. Your appetite 
-Continue to eat small amounts of the foods that appeal to you (diet Cokes are OK!) 
-You can drink Ensure or another nutritional shake if you want as a source of additional calories and nutrients 
-You have an appointment with Yamel Núñez, the [facility] dietician, this Friday 5. Your fatigue 
-You are sleeping well at night 
-Your energy is better with your improved pain control and with the dexamethasone 6. Your intestinal gas 
-This occurs frequently and is bothersome to you 
-I prescribed simethicone which you can take 4 times daily as need 7. Your cancer 
-You asked about your life expectancy today 
-You've been told that it may be 6 months to a year to a year and a half but you realize it will probably not be a year and a half. 
-We talked about what's important to you now as you live your life as fully as possible 
-You are considering taking some short trips to visit family and maybe to go to [place] 
-Your quality of life is very important to you and you have a deep konrad which sustains you This is what you have shared with us about Advance Care Planning Advance Care Planning 5/8/2017 Patient's Healthcare Decision Maker is: Named in scanned ACP document Primary Decision Maker Name Sen Petersen Primary Decision Maker Phone Number [phone] Primary Decision Maker Relationship to Patient Spouse Secondary Decision Maker Name Loyd Larry BROWERReginald Marleni Sang Secondary Decision Maker Relationship to Patient Sibling Confirm Advance Directive Yes, on file The Palliative Medicine Team is here to support you and your family. We will see you again in 4 weeks. Our Nurse Navigator Millicent Monroy or Rita Wills will check in with you by phone before that time. If there are any concerns before that time, such as medication questions, worsening symptoms or a need to see a physician for an urgent or emergent situation; please call [phone] (COPE). A physician is also on call after our normal business hours of 8am to 5pm.  
 
In order to serve you better, please allow up to 48 hours for prescription refills to be processed. Certain medications may require more paperwork or a written prescription that you may need to  from the office. We appreciate you letting us know of any refill requests as soon as possible. We also would like you to sign up for [employer] as well.  
 
Sincerely, 
 
 
 Lorena Lerma MD and the Palliative Medicine Team 
 
  
Introducing [facility]! Dear Raphael Marshall: Thank you for requesting a [employer] account. Our records indicate that you already have an active [employer] account. You can access your account anytime at [email or web address] on Request. [email or web address]/QA on Request Did you know that you can access your hospital and ER discharge instructions at any time in [employer]? You can also review all of your test results from your hospital stay or ER visit. Additional Information If you have questions, please visit the Frequently Asked Questions section of the [employer] website at [email or web address] on Request. [email or web address]/QA on Request/. Remember, [employer] is NOT to be used for urgent needs. For medical emergencies, dial 911. Now available from your iPhone and Android! Please provide this summary of care documentation to your next provider. Your primary care clinician is listed as Rubia Mccallum. If you have any questions after today's visit, please call [phone].

## 2017-06-14 NOTE — TELEPHONE ENCOUNTER
Spoke with patient,  HIPAA verified. Patient prefers to set up monthly port flushes in lieu of removing port at this time. At this time she is not pursing chemotherapy but following with palliative. Appointment with Dr. Anastasia Del Toro scheduled today/arrived. Advised [facility] would call with an appointment schedule. Encouraged to call with any ongoing questions/concerns.

## 2017-06-14 NOTE — PROGRESS NOTES
Palliative Medicine Office Visit  Palliative Medicine Nurse Check In  [phone]    Patient Name: Joy Olivier  YOB: 1955      Date of Office Visit:6/14/2017     Patient states: She is here for a follow up appointment and she had a biopsy done a few days ago. From Check In Sheet (scanned in Media):  Has a medical provider talked with you about cardiopulmonary resuscitation (CPR)? [x] Yes   [] No   [] Unable to obtain    Nurse reminder to complete or update ACP FlowSheet:  Advance Care Planning 5/8/2017   Patient's Healthcare Decision Maker is: Named in scanned ACP document   Primary Decision Maker Name Jessica Valladares   Primary Decision Maker Phone Number [phone]   Primary Decision Maker Relationship to Patient Spouse   Secondary Decision Maker Name [address] [place] [address]. [place]   Secondary Decision Maker Relationship to Patient Sibling   Confirm Advance Directive Yes, on file             Is there anything that we should know about you as a person in order to provide you the best care possible? Have you been to the ER, urgent care clinic since your last visit? [] Yes   [x] No   [] Unable to obtain    Have you been hospitalized since your last visit? [] Yes   [x] No   [] Unable to obtain    Have you seen or consulted any other health care providers outside of the [address] [place] [address] since your last visit? [x] Yes   [] No   [] Unable to obtain    Functional status (describe):         Last BM: 2 days ago.      accessed (date): 6/14/2017    Bottle review (for opioid pain medication):  Medication 1: Morphine ER 15mg  Date filled: 6/9/2017  Directions: 1 tab 3x day  # filled: 90 tabs  # left: 77  # pills taking per day:3 tabs   Last dose taken:today    Medication 2: Oxycodone 15mg  Date filled: 5/22/2017  Directions: 1 tab every 6hrs  # filled: 120 tabs  # left: 50 tabs  # pills taking per day: 4 tabs  Last dose taken:today    Medication 3:   Date filled:   Directions:   # filled:   # left:   # pills taking per day:  Last dose taken:    Medication 4:   Date filled:   Directions:   # filled:   # left:   # pills taking per day:  Last dose taken:

## 2017-06-14 NOTE — PATIENT INSTRUCTIONS
Dear Deep Godwin ,    It was a pleasure seeing you in the Palliative Medicine Office today. This is what we talked about:     1. Your abdominal pain  -You had the celiac plexus block done and this really helped with your pain!  - Continue long-acting morphine 15-mg every 12 hours  and oxycodone 15-mg every 4 hours as needed for breakthrough pain  -Continue dexamethasone 2-mg twice daily 1 tab in the morning and 1 tab between 2:00 pm and 3:00 pm)    2. Your nausea  -This hasn't been a problem for you since you left the hospital  -Continue ondansetron 4-mg every 8 hours as needed  -I prescribed Phenergan 25-mg suppositories every 6 hours as needed as an additional anti-nausea medication    3. Your constipation  -Increase senna to 2 tabs at bedtime  -You may need to increase to 3 tabs to keep regular bowel movements    4. Your appetite  -Continue to eat small amounts of the foods that appeal to you (diet Cokes are OK!)  -You can drink Ensure or another nutritional shake if you want as a source of additional calories and nutrients  -You have an appointment with Margie Mccollum, the [facility] dietician, this Friday    5. Your fatigue  -You are sleeping well at night  -Your energy is better with your improved pain control and with the dexamethasone    6. Your intestinal gas  -This occurs frequently and is bothersome to you  -I prescribed simethicone which you can take 4 times daily as need    7.  Your cancer  -You asked about your life expectancy today  -You've been told that it may be 6 months to a year to a year and a half but you realize it will probably not be a year and a half.  -We talked about what's important to you now as you live your life as fully as possible  -You are considering taking some short trips to visit family and maybe to go to [place]  -Your quality of life is very important to you and you have a deep konrad which sustains you    This is what you have shared with us about Advance Care Planning  Advance Care Planning 5/8/2017   Patient's Healthcare Decision Maker is: Named in scanned ACP document   Primary Decision Maker Name Tk Magaña   Primary Decision Maker Phone Number [phone]   Primary Decision Maker Relationship to Patient Spouse   Secondary Decision Maker Name [address] [facility] [address] [place]   Secondary Decision Maker Relationship to Patient Sibling   Confirm Advance Directive Yes, on file       The Palliative Medicine Team is here to support you and your family. We will see you again in 4 weeks. Our Nurse Navigator Denise Cope or Gatito Issa will check in with you by phone before that time. If there are any concerns before that time, such as medication questions, worsening symptoms or a need to see a physician for an urgent or emergent situation; please call [phone] (COPE). A physician is also on call after our normal business hours of 8am to 5pm.     In order to serve you better, please allow up to 48 hours for prescription refills to be processed. Certain medications may require more paperwork or a written prescription that you may need to  from the office. We appreciate you letting us know of any refill requests as soon as possible. We also would like you to sign up for [employer] as well.     Sincerely,      Sam Prather MD and the Palliative Medicine Team

## 2017-06-14 NOTE — TELEPHONE ENCOUNTER
Unable to get a hold of patient. Call to palliative spoke with [place]. Pt does have an appointment with palliative scheduled today. Our office would like to discuss the removal of port or schedule monthly port flushes.

## 2017-06-14 NOTE — TELEPHONE ENCOUNTER
Jj Valencia with Dr. Marianne Bear office oncology trying to contact patient. Patient has port, she is not receiving chemo, port needs to be flushed or removed. Requesting someone to talk to patient about port or have her call Jj Valencia while in office.  Patient is not returning their phone calls

## 2017-06-16 NOTE — PROGRESS NOTES
[employer]    Medical Nutrition Therapy   [phone]      Nutrition Plan and Goals    Start Creon 36,000 units Lipase - 2 capsules per meal and 1 cap for snack   Monitor GI symptoms       Goals:  Weight Maintenance throughout treatment   Improve treatment tolerance   Prevent or correct nutrition deficiencies  Minimize short-term and long-term treatment side effects   Nutrition Assessment   Support visit with patient to introduce self and discussed role of oncology dietitian. Explained that RD is available to be a resource for managing symptoms, minimizing weight changes and maintaining optimal nutrition status during and after cancer treatment. Ms Lucia Carbajal and her sister are here today to discuss what she should and should not be eating. She reports she cannot tolerate greasy or oily foods, vegetables cooked in oils/fats but able to tolerate foods like grits, banana, granola, mashed potatoes and bananas. She finds that she is eating more carbohydrates because she they are easy to tolerate. We discussed GI symptoms and bowel movements currently and prior to diagnosis. Currently she has been struggling with constipation and taking 2 Senna caps in the morning, 2 Senna caps at lunch and 2 senna caps in the evening. If she does not move her bowels then she will add Lactulose and then typically has about 3 bowel movements. She reports the first 2 bowel movements are formed then last one is loose. She reports the odor is foul. She reports certain foods cause more gas production. Previous GI symptoms: Reports gas, bowel movements floating/hovering, greasy oily appearing and smelled foul. Constipation- using senna. Recommended to start miralax on 5/30. Given lactulose on 5/31 and using. Weight Trends:  Reports weight loss of 20# since December 2016  Estimated body mass index is 36.91 kg/(m^2) as calculated from the following:    Height as of 6/14/17: 5' (1.524 m).     Weight as of 6/14/17: 189 lb (85.7 kg). Ht Readings from Last 1 Encounters:   06/14/17 5' (1.524 m)     Wt Readings from Last 5 Encounters:   06/14/17 189 lb (85.7 kg)   06/02/17 185 lb (83.9 kg)   05/31/17 189 lb 9.6 oz (86 kg)   05/24/17 191 lb 1.6 oz (86.7 kg)   05/19/17 195 lb 12.8 oz (88.8 kg)     Diagnosis: Stage III Pancreatic Cancer   Type of treatment: Seen back in May and declined chemotherapy. Now would like to re-evaluate possible chemotherapy. Medications: [x] Reviewed   Dietary Supplements: [x] None - previously ones listed she is no longer taking. Estimated Nutrition Needs:   Calorie Range: 1853-2118kcal/day Formula: MJS x 1.4-1.6   Protein Range: 68-85g/day  Formula: 0.8-1.0 g/kg   Fluid Needs: 2000ml      NUTRITION DIAGNOSIS    Altered GI Function related to pancreatic cancer as evidence by signs and symptoms characteristic of exocrine pancreatic insufficiency. See GI symptoms above. NUTRITION PRESCRIPTION:  - Start Creon 36,000 units lipase - 2 capsules per meal and 1 per snack. HANDOUTS: Pancreatic Enzymes handout, Creon coupon card, Diet and Nutrition for pancreatic cancer. MONITORING and EVALUATION:   · RD contact information provided to patient if nutrition-related concerns arise  · Specific tips and techniques to facilitate interventions provided and discussed with patient.        Sherryle Maffucci, [address], [address] [place] [address] , [address]

## 2017-06-19 NOTE — TELEPHONE ENCOUNTER
Patient have more pain. Medication is not helping. Pain is uncontrolled. Patient called back. States last week she went to a nutritionists and was told to take \"Creon\" 1 -2 before meals. She thinks that was what cause the problems she was having.  She stopped taking and all her symptoms went away

## 2017-06-20 PROBLEM — C25.9 MALIGNANT NEOPLASM OF PANCREAS (HCC): Status: ACTIVE | Noted: 2017-01-01

## 2017-06-20 NOTE — PROGRESS NOTES
Palliative Medicine  Nursing Note  [phone]  Fax [phone]        Clinic Office Visit  Patient Name: Domingo Fernández  YOB: [birth year]      Advance Care Planning 6/14/2017   Patient's Healthcare Decision Maker is: Named in scanned ACP document   Primary Decision Maker Name Teresa Carolina   Primary Decision Maker Phone Number [phone]   Primary Decision Maker Relationship to Patient Spouse   Secondary Decision Maker Name -   Secondary Decision Maker Relationship to Patient -   Confirm Advance Directive Yes, on file   Does the patient have other document types Do Not Resuscitate     Returned call to Mrs. Bennett to instruct, per Dr. Darrick Cranker, that she may take 2 tabs of 15mg Oxycodone every 3-4 hours  . No answer, left VM to return call at her convenience. Called and left message on her  cell as well. 4:50 pm Returned call to Mr. Carlyn aWtkins who states that Jessika Garcia is doing much better today after stopping her Creon last night. She had started that on Friday and that night her pain increased and her Oxycodone 15mg was not providing any relief. After some thought yesterday, her  suggested stopping the Creon as that was the only thing that had changed prior to her pain. Today, she is doing well with 1 tab of 15mg Oxycodone every 4 hours if needed and it is relieving her pain as it had before. Discussed that should her pain return, Dr. Darrick Cranker suggested that she could take 2 tabs of her 15mg Oxycodone every 4 hours as needed. He verbalized understanding. Mr. Carlyn Watkins stated they had just met with Oncology and she is considering starting Gemzar Chemotherapy. Reiterated to call PM at [phone] for any questions or concerns, he verbalized understanding.     Tiffanie Schuster, BSN, RN, [facility]  Palliative Medicine

## 2017-06-20 NOTE — PROGRESS NOTES
Had the opportunity to meet with patient and , Andrea Gonzalez to discuss OPIC and chemotherapy expectations. Pt has made the decision to proceed with chemotherapy at the encouragement of close family friends. Admitted she is still unsure if this is the right decision. Advised that she could stop treatment anytime, encouraging her to discuss with [place] as needed. Currently she is struggling with constipation due to pain medications although she now thinks taking senna daily and lactulose daily has helped. Reviewed and provided chemo care and chemo and you pamphlet. Discussed the importance of appropriate diet, nutrition and hydration. Reviewed N/V and bowel control. Discussed possibility of mouth sores/tenderness and ways to combat. Discussed premedications and the medication available at home once prescribed. Reviewed infection control, importance on hand washing and sun exposure precautions. Reviewed hair loss possibilities and the risk of thrombocytopenia. Answered all questions and encouraged to call with any ongoing concerns. They are hoping to start on Thursdays at 11:00. Chemo consent obtained.

## 2017-06-20 NOTE — PROGRESS NOTES
Outpatient Infusion Center Short Visit Progress Note    1235 Pt admited new to [facility] for Port flush and labs ambulatory in stable condition. Assessment completed. No new concerns voiced. Please review pending lab results in [address] [place] [address]. Patient Vitals for the past 12 hrs:   Temp Pulse Resp BP SpO2   06/20/17 1451 98 °F (36.7 °C) (!) 54 16 129/74 98 %       Port with positive blood return. Lab drawn, flushed, heparinized and de-accessed per protocol. 1500 Pt tolerated treatment well. D/c home ambulatory in no distress. Pt to go see Dr. Michael Rose for chemo education, plan to get CT and start chemo soon.

## 2017-06-20 NOTE — PROGRESS NOTES
Labs look good. Sodium is a bit low today. Encourage PO fluids, will recheck at next visit. Please let her know.

## 2017-06-20 NOTE — PROGRESS NOTES
Prateek Aguilar is a 60 y.o. female here today for follow up. States having left flank discomfort 3/10.

## 2017-06-20 NOTE — PROGRESS NOTES
Hematology/Oncology follow up    REASON FOR VISIT: Stage III pancreatic adenocarcinoma - unresectable. Reconsideration of palliative chemotherapy    HISTORY OF PRESENT ILLNESS: Ms. Kaden Miller is a 60 y.o. female with unresectable pancreatic cancer. She declined chemotherapy initially and now presents to reconsider it. Oncologic history  She presented to the ED on 5/8/17 with severe abdominal pain. She had it for 4-5 months in the lower abdomen. Pain radiated to the back/shoulder for which she had been trying to go to Physical Therapy to manage. This provided little relief for her. Medications over the counter had also provided little relief. Symptoms associated with fatigue and  unintentional weight loss of approximately 20 pounds since December 2017    CT abdomen/pelvis completed in the ED revealed a 2.5cm pancreatic body lesion that involved the splenic artery and celiac artery. Dr. Alyssa Byrd with GI performed an EUS 5/9/17 with pathology showing adenocarcinoma. LN biopsy was non diagnostic. Seen by Dr. Albert Hoyos with Surgery and considered unresectable at the time. CT chest 5/11/17 showed a 4 mm lung nodule in the left upper lobe that was non specific. After several discussions she declined any palliative chemotherapy.      Past Medical History:   Diagnosis Date    Allergic cough 10/5/2010    AR (allergic rhinitis)     cough    GERD (gastroesophageal reflux disease)     Tx FOR \"REALLY BAD HEARTBURN\" IN 2013    Nausea & vomiting     PMS (premenstrual syndrome) 12/26/14    IN PAST, WAS SEVERE; MENOPAUSE 10 YRS AGO, PT SAYS    Postmenopause, LMP 51yo, NO HRT 10/5/2010    S/P normal colonoscopy 2004 10/5/2010    Unspecified sleep apnea 12/26/14    USES CPAP       Past Surgical History:   Procedure Laterality Date    DEXA BONE DENSITY STUDY AXIAL  2/2004    Normal; Dr Mil Nina (prev Gyn)   [address] [facility]  30yo    benign AUB; negative    ENDOSCOPY, COLON, DIAGNOSTIC  12/26/14    LAST COLONOSCOPY JAN 2014; PRIOR SCOPE 2004    HX RHINOPLASTY  1986    w/ chin implant (mentoplasty)    HX WISDOM TEETH EXTRACTION      [place] OUTER EAR SURGERY PROC UNLISTED  1981    excision benign tumor behind right ear       Allergies   Allergen Reactions    Zofran [Ondansetron Hcl (Pf)] Other (comments)     Doesn't work per pt       Current Outpatient Prescriptions   Medication Sig Dispense Refill    senna-docusate (SENNA PLUS) 8.6-50 mg per tablet Take 1 Tab by mouth daily.  prochlorperazine (COMPAZINE) 10 mg tablet Take 25 mg by mouth every six (6) hours as needed.  morphine CR (MS CONTIN) 15 mg CR tablet Take 1 Tab by mouth three (3) times daily. Max Daily Amount: 45 mg. 90 Tab 0    oxyCODONE IR (OXY-IR) 15 mg immediate release tablet Take 1 Tab by mouth every four (4) hours as needed. Max Daily Amount: 90 mg. 180 Tab 0    bisacodyl (DULCOLAX) 10 mg suppository Insert 10 mg into rectum daily. 30 Suppository 2    lactulose (CHRONULAC) 10 gram/15 mL solution Take 30 mL by mouth three (3) times daily. 480 mL 1    dexamethasone (DECADRON) 2 mg tablet Take one 2mg tab twice daily. One in the AM and one between 2pm and 3pm. 60 Tab 1    HYDROcodone-acetaminophen (NORCO) 5-325 mg per tablet TAKE 1 TABLET BY MOUTH EVERY 4 HOURS AS NEEDED FOR PAIN  0    lipase-protease-amylase (CREON) 36,000-114,000- 180,000 unit cpDR Take 2 Caps by mouth three (3) times daily (with meals). Take 1 cap with snacks twice daily  Indications: exocrine pancreatic insufficiency 240 Cap 4    simethicone (MYLICON) 80 mg chewable tablet Take 1 Tab by mouth every six (6) hours as needed for Flatulence. 80 Tab 3    magnesium hydroxide (CHANG MILK OF MAGNESIA) 400 mg/5 mL suspension Take 30 mL by mouth daily as needed for Constipation.  nystatin-triamcinolone (MYCOLOG II) topical cream       ondansetron (ZOFRAN ODT) 4 mg disintegrating tablet Take 4 mg by mouth every eight (8) hours as needed for Nausea.       MILK THISTLE, BULK, Take by mouth.  MAGNESIUM PO Take 400 mg by mouth daily. 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 [address] Alcohol use No      Comment: very very rare    Drug use: No    Sexual activity: Not Currently     Other Topics Concern    Occupational Exposure No     down to 1 cup of cooffee qam; was up to 3-4 cups up unitl ~ the past few weeks    Weight Concern Yes     happy she has lost 20 lbs since strict diet and exercise    Special Diet Yes     since beginning of August went on strict diet of carb cuonting, low fat, protein and calorie counts; well balanced    Exercise Yes     swims a lot and since August started walking about 2-5 x a week x 45 minutes     Social History Narrative       Family History   Problem Relation Age of Onset    Stroke Mother     Heart Disease Mother     Heart Disease Father     Arthritis-rheumatoid Sister     Cancer Brother      skin cancer    HIV/AIDS Brother     Stroke Brother 79     TIA's    Heart Disease Brother 79     CABG    Stroke Maternal Grandmother     Heart Attack Maternal Grandfather     Heart Attack Paternal Grandmother     Heart Attack Paternal Grandfather     Colon Cancer Sister     Anesth Problems Neg Hx        ROS  As per the HPI, otherwise a comprehensive ROS is negative. Since last seen she had constant back pain. She has since had a celiac plexus block that was done on 6/2/17 which helped. 4 days ago she started Creon and has since had left flank. She stopped Creon yesterday and feels better.   She has regular BMs , has to take lactulose every 24 hours. She has noted some itching. Recently spoke with a friend who urged her to consider palliative chemotherapy    ECOG PS is 1. Emotional well being addressed. Patient reiterates that she prefers quality over quantity. Physical Examination:   Visit Vitals    /68    Pulse 62    Temp 98.4 °F (36.9 °C)    Resp 16    Ht 5' 6.5\" (1.689 m)    Wt 187 lb 9.6 oz (85.1 kg)    LMP 10/01/2005 (Approximate)    SpO2 98%    BMI 29.83 kg/m2     General appearance - alert, pleasant, occasionally tearful  Mental status - oriented to person, place, and time  Mouth - mucous membranes moist, pharynx normal without lesions  Neck - supple, no significant adenopathy  Chest - clear to auscultation, no wheezes, rales or rhonchi, symmetric air entry  Heart - normal rate, regular rhythm, normal S1, S2, no murmurs, rubs, clicks or gallops  Abdomen - soft, non-tender to light palpation, +bowel sounds  Extremities - peripheral pulses normal, no pedal edema      LABS  Lab Results   Component Value Date/Time    WBC 5.6 05/09/2017 03:22 AM    HGB 13.2 05/09/2017 03:22 AM    HCT 39.0 05/09/2017 03:22 AM    PLATELET 800 04/05/3495 03:22 AM    MCV 90.1 05/09/2017 03:22 AM    ABS. NEUTROPHILS 3.0 05/09/2017 03:22 AM     Lab Results   Component Value Date/Time    Sodium 140 05/09/2017 03:22 AM    Potassium 3.6 05/09/2017 03:22 AM    Chloride 106 05/09/2017 03:22 AM    CO2 27 05/09/2017 03:22 AM    Glucose 106 05/09/2017 03:22 AM    BUN 8 05/09/2017 03:22 AM    Creatinine 0.55 05/09/2017 03:22 AM    GFR est AA >60 05/09/2017 03:22 AM    GFR est non-AA >60 05/09/2017 03:22 AM    Calcium 8.6 05/09/2017 03:22 AM     Lab Results   Component Value Date/Time    AST (SGOT) 9 05/09/2017 03:22 AM    Alk.  phosphatase 77 05/09/2017 03:22 AM    Protein, total 6.1 05/09/2017 03:22 AM    Albumin 3.6 05/09/2017 03:22 AM    Globulin 2.5 05/09/2017 03:22 AM    A-G Ratio 1.4 05/09/2017 03:22 AM         IMAGING    CT Chest 5/11/17  IMPRESSION:   1. A 4 mm lung nodule in the left upper lobe is nonspecific. Follow-up chest CT  is recommended in 3 months. 2. A pancreatic mass is again demonstrated as seen on recent abdomen CT. CT 5/8/17  IMPRESSION:  1. No acute process on CT. 2. 2.5 cm pancreatic body lesion may represent malignant neoplasm and involves  the splenic artery and celiac artery. Consider nonemergent endoscopic ultrasound  and biopsy versus MRI abdomen. ASSESSMENT  Ms. Júnior Mason is a 60 y.o. female with  pancreatic adenocarcinoma on CT imaging 5/8/17, T3N0 (celiac involvement) and currently unresectable as per Tumor board recommendations. Questionable 4 mm left upper lobe nodule and no clear evidence of metastasis. She presented with pancreatitis and abdominal pain, s/p Celiac block. She is intolerant to Creon      DISCUSSION/PLAN  1. Pancreatic adenocarcinoma Stage III unresectable. Was reviewed in Tumor Board an currently unresectable  Recommendations from Tumor board reviewed. Discussed starting with neoadjuvant chemotherapy with or without radiation (added after a few months) previously which the patient declined. She continues to struggle with balancing longevity vs QOL if she were to start chemotherapy. She has no inclination to undergo a curative Whipples even if she were deemed to be resectable after neoadjuvant chemotherapy. Again reviewed that palliative chemotherapy does improve OS and QOL compared to placebo. Options include FOLFIRINOX, Grassflat Abraxane (modified), Grassflat alone, FOLFOX.   Side effects reviewed in detail  She is unsure if she would like to proceed, reassured that at anytime if QOL is compromised on palliative chemotherapy she may decide to stop  Reviewed considerations if and when celiac plexus seizes to provide adequate pain relief, such as radiation    After careful consideration will proceed with palliative Gemcitabine and abraxane (every 2 weeks)  Port is in place  Baseline labs to include CA 19-9  Baseline CT CAP    RTC C1D1      Spent  > 60 mins today, > 50% spent in counseling and care co ordination.      Signed by: Tammi Tamayo MD                     June 20, 2017

## 2017-06-21 NOTE — TELEPHONE ENCOUNTER
Call returned to patient, HIPAA verified. Re-enforced provider recommendations with patient, patient thankful for explanation and assistance.

## 2017-06-21 NOTE — PROGRESS NOTES
Call received from patient, HIPAA verified. Patient updated on provider note and recommendations, verbalized understanding and thanked for call.

## 2017-07-03 NOTE — TELEPHONE ENCOUNTER
Updated chemo orders faxed to [facility] for 7/6/17     ML for patient to return call if questions prior to 7/6/17 appointment.

## 2017-07-05 NOTE — TELEPHONE ENCOUNTER
Triage for Controlled Substance Refill Request    Pain Diagnosis: _Malignant neoplasm of pancreas [facility])    Last Outpatient Visit: _6/14/2017    Next Outpatient Visit: _7/19/2017    Reason for refill needed outside of office visit? Will run out before next visit.     -Pain escalation requiring increased medication- no  -Appointment not scheduled prior to need for scheduled refill- no   -Appointment scheduled but missed or moved prior to need for scheduled refill-no     Pharmacy: _[facility]/PHARMACY #[id number] [address] [facility]    Medication:morphine CR (MS CONTIN) 15 mg CR tablet  Dose and directions: Take 1 Tab by mouth three (3) times daily.   Number dispensed:90  Date filled ( or Pharmacy)6/9/2017  # left:15          reviewed: _yes     Date of Urine Drug Screen:  _n/a    Opioid Safety Handout given:  _yes     Appropriate for refill:  _yes     Action:  _please fill

## 2017-07-05 NOTE — TELEPHONE ENCOUNTER
From: Arelis Ybarra  To: Jennifer Diaz MD  Sent: 7/3/2017 5:04 PM EDT  Subject: Medication Renewal Request    Original authorizing provider: Pamla Leach, MD Olivia Favre. Sabrina would like a refill of the following medications:  morphine CR (MS CONTIN) 15 mg CR tablet Jennifer Diaz MD]    Preferred pharmacy: [facility]/PHARMACY #[id number] - [address]    Comment: This med will run out next week around Thursday, July 13th. I was hoping to  the prescription when I come over that way on Thursday this week for my 1st Chemo treatment thus saving me a trip. Please note, I take this 3 days per day (every 8 hours).

## 2017-07-06 PROBLEM — R10.11 ABDOMINAL PAIN, RIGHT UPPER QUADRANT: Status: ACTIVE | Noted: 2017-01-01

## 2017-07-06 NOTE — PROGRESS NOTES
Palliative Medicine  Nursing Note  [phone]  Fax [phone]        Clinic Office Visit  Patient Name: Zain Barnes  YOB: [birth year]      Advance Care Planning 6/14/2017   Patient's Healthcare Decision Maker is: Named in scanned ACP document   Primary Decision Maker Name Rozelle Kawasaki   Primary Decision Maker Phone Number [phone]   Primary Decision Maker Relationship to Patient Spouse   Secondary Decision Maker Name -   Secondary Decision Maker Relationship to Patient -   Confirm Advance Directive Yes, on file   Does the patient have other document types Do Not Resuscitate         Pharmacy called requesting diagnosis code for prescription refill, spoke with Daija Waite and give him code of neoplasm of the pancrease C25.1      Calvin Bustos, GALN, RN, OCN, VIA [facility]  Palliative Medicine

## 2017-07-06 NOTE — PROGRESS NOTES
[address]    Medical Nutrition Therapy   [phone]      Nutrition Plan and Goals    Monitor GI symptoms       Goals:  Weight Maintenance throughout treatment   Improve treatment tolerance   Prevent or correct nutrition deficiencies  Minimize short-term and long-term treatment side effects   Nutrition Assessment   Here for start of treatment. She was seen by ISAAC on 6/16 and was started on Creon. She developed increase pain with creon and has since stopped it. She is not interested in trying another pancreatic enzyme, she feels she has been able to manage her GI issues. She has been limiting the fatty food she consumes. She reports that she has a good appetite, eating throughout the day. She is moving her bowels once daily, denies diarrhea and managing bowels by taking 3 Senna caps 3x day for a total of 9. Constipation- using senna. Recommended to start miralax on 5/30. Given lactulose on 5/31 and using.- Unclear if she is still using the lactulose. Weight Trends:  Reports weight loss of 20# since December 2016  Estimated body mass index is 30.37 kg/(m^2) as calculated from the following:    Height as of this encounter: 5' 6.5\" (1.689 m). Weight as of this encounter: 191 lb (86.6 kg). Ht Readings from Last 1 Encounters:   07/06/17 5' 6.5\" (1.689 m)     Wt Readings from Last 5 Encounters:   07/06/17 191 lb (86.6 kg)   06/20/17 187 lb 9.6 oz (85.1 kg)   06/16/17 188 lb 3.2 oz (85.4 kg)   06/14/17 189 lb (85.7 kg)   06/02/17 185 lb (83.9 kg)     Diagnosis: Stage III Pancreatic Cancer   Type of treatment: Seen back in May and declined chemotherapy. Now would like to re-evaluate possible chemotherapy. Medications: [x] Reviewed   Dietary Supplements: [x] None - previously ones listed she is no longer taking.      Estimated Nutrition Needs:   Calorie Range: 1853-2118kcal/day Formula: MJS x 1.4-1.6   Protein Range: 68-85g/day  Formula: 0.8-1.0 g/kg   Fluid Needs: 2000ml NUTRITION DIAGNOSIS    Altered GI Function related to pancreatic cancer as evidence by signs and symptoms characteristic of exocrine pancreatic insufficiency. See GI symptoms above. MONITORING and EVALUATION:   · RD contact information provided to patient if nutrition-related concerns arise  · Specific tips and techniques to facilitate interventions provided and discussed with patient.        Chano Jones, [address] [place] [address], [address] [place] [address] , [address]

## 2017-07-06 NOTE — PROGRESS NOTES
1000 Pt admit to [facility] for C1D1 Abraxane/Gemzar  ambulatory in stable condition. Assessment completed. No new concerns voiced. Port accessed and flushed with positive blood return, labs drawn per order and sent for processing. 1006 patient to MD office. 1115 patient returned to [place] [address]. Normal Saline started at [facility]. Chemotherapy Flowsheet 7/6/2017   Cycle C1D1   Date 7/6/2017   Drug / Regimen Abraxane/Gemzar   Pre Meds given   Notes given             Visit Vitals    /73    Pulse 68    Temp 96.8 °F (36 °C)    Resp 16    LMP 10/01/2005 (Approximate)    SpO2 100%       Medications:  Normal Saline KVO  Decadron IV Push  Abraxane  Gemzar  Heparin Flush      1530 Pt tolerated treatment well. Patient monitored for 30 minutes post infusion. Port maintained positive blood return throughout treatment, flushed with positive blood return at conclusion, port flushed and de-accessed per protocol. D/c home ambulatory in no distress. Pt aware of next appointment scheduled for 7/20/17. Recent Results (from the past 12 hour(s))   CBC WITH AUTOMATED DIFF    Collection Time: 07/06/17  9:57 AM   Result Value Ref Range    WBC 8.1 3.6 - 11.0 K/uL    RBC 4.11 3.80 - 5.20 M/uL    HGB 12.3 11.5 - 16.0 g/dL    HCT 36.6 35.0 - 47.0 %    MCV 89.1 80.0 - 99.0 FL    MCH 29.9 26.0 - 34.0 PG    MCHC 33.6 30.0 - 36.5 g/dL    RDW 14.1 11.5 - 14.5 %    PLATELET 514 376 - 412 K/uL    NEUTROPHILS 54 32 - 75 %    LYMPHOCYTES 35 12 - 49 %    MONOCYTES 7 5 - 13 %    EOSINOPHILS 3 0 - 7 %    BASOPHILS 1 0 - 1 %    ABS. NEUTROPHILS 4.4 1.8 - 8.0 K/UL    ABS. LYMPHOCYTES 2.9 0.8 - 3.5 K/UL    ABS. MONOCYTES 0.5 0.0 - 1.0 K/UL    ABS. EOSINOPHILS 0.2 0.0 - 0.4 K/UL    ABS.  BASOPHILS 0.1 0.0 - 0.1 K/UL   METABOLIC PANEL, BASIC    Collection Time: 07/06/17  9:57 AM   Result Value Ref Range    Sodium 135 (L) 136 - 145 mmol/L    Potassium 3.8 3.5 - 5.1 mmol/L    Chloride 99 97 - 108 mmol/L    CO2 26 21 - 32 mmol/L    Anion gap 10 5 - 15 mmol/L Glucose 106 (H) 65 - 100 mg/dL    BUN 15 6 - 20 MG/DL    Creatinine 0.66 0.55 - 1.02 MG/DL    BUN/Creatinine ratio 23 (H) 12 - 20      GFR est AA >60 >60 ml/min/1.73m2    GFR est non-AA >60 >60 ml/min/1.73m2    Calcium 8.3 (L) 8.5 - 10.1 MG/DL   HEPATIC FUNCTION PANEL    Collection Time: 07/06/17  9:57 AM   Result Value Ref Range    Protein, total 6.1 (L) 6.4 - 8.2 g/dL    Albumin 3.4 (L) 3.5 - 5.0 g/dL    Globulin 2.7 2.0 - 4.0 g/dL    A-G Ratio 1.3 1.1 - 2.2      Bilirubin, total 0.8 0.2 - 1.0 MG/DL    Bilirubin, direct 0.2 0.0 - 0.2 MG/DL    Alk.  phosphatase 80 45 - 117 U/L    AST (SGOT) 15 15 - 37 U/L    ALT (SGPT) 35 12 - 78 U/L

## 2017-07-06 NOTE — PROGRESS NOTES
Hematology/Oncology follow up    REASON FOR VISIT: Stage III pancreatic adenocarcinoma - unresectable. Palliative Gemcitabine+ Abraxane cycle 1    HISTORY OF PRESENT ILLNESS: Ms. Dmitry Long is a 60 y.o. female with unresectable pancreatic cancer. She declined chemotherapy initially and now presents to start after reconsideration    Oncologic history  She presented to the ED on 5/8/17 with severe abdominal pain. She had it for 4-5 months in the lower abdomen. Pain radiated to the back/shoulder for which she had been trying to go to Physical Therapy to manage. This provided little relief for her. Medications over the counter had also provided little relief. Symptoms associated with fatigue and  unintentional weight loss of approximately 20 pounds since December 2017    CT abdomen/pelvis completed in the ED revealed a 2.5cm pancreatic body lesion that involved the splenic artery and celiac artery. Dr. Neelam Cabrera with GI performed an EUS 5/9/17 with pathology showing adenocarcinoma. LN biopsy was non diagnostic. Seen by Dr. Wilkerson See with Surgery and considered unresectable at the time. CT chest 5/11/17 showed a 4 mm lung nodule in the left upper lobe that was non specific.   CT CAP 6/20/17 stable    Palliative Chemotherapy  CA 19-9 285  7/6/17: Cycle 1 [facility] Abraxane    Past Medical History:   Diagnosis Date    Allergic cough 10/5/2010    AR (allergic rhinitis)     cough    GERD (gastroesophageal reflux disease)     Tx FOR \"REALLY BAD HEARTBURN\" IN 2013    Nausea & vomiting     PMS (premenstrual syndrome) 12/26/14    IN PAST, WAS SEVERE; MENOPAUSE 10 YRS AGO, PT SAYS    Postmenopause, LMP 49yo, NO HRT 10/5/2010    S/P normal colonoscopy 2004 10/5/2010    Unspecified sleep apnea 12/26/14    USES CPAP       Past Surgical History:   Procedure Laterality Date    DEXA BONE DENSITY STUDY AXIAL  2/2004    Normal; Dr Rigoberto Menjivar (prev Gyn)   [id number] [facility]  32yo    benign AUB; negative    ENDOSCOPY, COLON, DIAGNOSTIC 12/26/14    LAST COLONOSCOPY JAN 2014; PRIOR SCOPE 2004    HX RHINOPLASTY  1986    w/ chin implant (mentoplasty)    HX WISDOM TEETH EXTRACTION      OR OUTER EAR SURGERY PROC UNLISTED  1981    excision benign tumor behind right ear       Allergies   Allergen Reactions    Zofran [Ondansetron Hcl (Pf)] Other (comments)     Doesn't work per pt       Current Outpatient Prescriptions   Medication Sig Dispense Refill    [START ON 7/9/2017] morphine CR (MS CONTIN) 15 mg CR tablet Take 1 Tab by mouth three (3) times daily. Max Daily Amount: 45 mg. 90 Tab 0    senna-docusate (SENNA PLUS) 8.6-50 mg per tablet Take 3 Tabs by mouth three (3) times daily.  magnesium hydroxide (CHANG MILK OF MAGNESIA) 400 mg/5 mL suspension Take 30 mL by mouth daily as needed for Constipation.  oxyCODONE IR (OXY-IR) 15 mg immediate release tablet Take 1 Tab by mouth every four (4) hours as needed. Max Daily Amount: 90 mg. 180 Tab 0    bisacodyl (DULCOLAX) 10 mg suppository Insert 10 mg into rectum daily. 30 Suppository 2    lactulose (CHRONULAC) 10 gram/15 mL solution Take 30 mL by mouth three (3) times daily. 480 mL 1    dexamethasone (DECADRON) 2 mg tablet Take one 2mg tab twice daily. One in the AM and one between 2pm and 3pm. 60 Tab 1    HYDROcodone-acetaminophen (NORCO) 5-325 mg per tablet TAKE 1 TABLET BY MOUTH EVERY 4 HOURS AS NEEDED FOR PAIN  0    lipase-protease-amylase (CREON) 36,000-114,000- 180,000 unit cpDR Take 2 Caps by mouth three (3) times daily (with meals). Take 1 cap with snacks twice daily  Indications: exocrine pancreatic insufficiency 240 Cap 4    simethicone (MYLICON) 80 mg chewable tablet Take 1 Tab by mouth every six (6) hours as needed for Flatulence. 80 Tab 3    nystatin-triamcinolone (MYCOLOG II) topical cream       ondansetron (ZOFRAN ODT) 4 mg disintegrating tablet Take 4 mg by mouth every eight (8) hours as needed for Nausea.  MILK THISTLE, BULK, Take  by mouth.       MAGNESIUM PO Take 400 mg by mouth daily. Facility-Administered Medications Ordered in Other Visits   Medication Dose Route Frequency Provider Last Rate Last Dose    sodium chloride 0.9 % injection 10 mL  10 mL IntraVENous PRN Amber Tamez MD   10 mL at 07/06/17 1004    heparin (porcine) pf 500 Units  500 Units IntraVENous PRN Amber Tamez MD        sodium chloride (NS) flush 10-40 mL  10-40 mL IntraVENous PRN Amber Tamez MD   10 mL at 07/06/17 1005    gemcitabine (GEMZAR) 1,600 mg in 0.9% sodium chloride 250 mL, overfill volume 25 mL chemo infusion  1,600 mg IntraVENous ONCE Amber Tamez MD        PACLitaxel-Protein Bound (ABRAXANE) 200 mg chemo infusion  200 mg IntraVENous ONCE Amber Tamez MD        0.9% sodium chloride infusion  25 mL/hr IntraVENous CONTINUOUS Jeannine Lam MD        dexamethasone (DECADRON) 4 mg/mL injection 8 mg  8 mg IntraVENous ONCE Amber Tamez MD        prochlorperazine (COMPAZINE) with saline injection 10 mg  10 mg IntraVENous Q6H PRN Amber Tamez MD           Social History     Social History    Marital status:      Spouse name: N/A    Number of children: 0    Years of education: N/A     Occupational History    Homemaker; 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 [address] Alcohol use No      Comment: very very rare    Drug use: No    Sexual activity: Not Currently     Other Topics Concern    Occupational Exposure No     down to 1 cup of cooffee qam; was up to 3-4 cups up unitl ~ the past few weeks    Weight Concern Yes     happy she has lost 20 lbs since strict diet and exercise    Special Diet Yes     since beginning of August went on strict diet of carb cuonting, low fat, protein and calorie counts; well balanced  Exercise Yes     swims a lot and since August started walking about 2-5 x a week x 45 minutes     Social History Narrative       Family History   Problem Relation Age of Onset   [employer] Stroke Mother     Heart Disease Mother     Heart Disease Father     Arthritis-rheumatoid Sister     Cancer Brother      skin cancer    HIV/AIDS Brother     Stroke Brother 79     TIA's    Heart Disease Brother 79     CABG    Stroke Maternal Grandmother     Heart Attack Maternal Grandfather     Heart Attack Paternal Grandmother     Heart Attack Paternal Grandfather     Colon Cancer Sister     Anesth Problems Neg Hx        ROS  She was not sleeping so she stopped her steroids 2 days ago. She was on it for 4 weeks prior to that. She has some pain return in the back. She is needing the Oxycodone every 3 hours. Takes 15 mg of CR Morphine Q 8 hours. She has regular BMs on 3 tabs of Senna TID , has to take lactulose every 24 hours. Appetite is good. ECOG PS is 1. Emotional well being addressed. Patient reiterates that she prefers quality over quantity.       Physical Examination:   Visit Vitals    BP 99/71    Pulse 70    Temp 97.3 °F (36.3 °C)    Resp 20    Ht 5' 6.5\" (1.689 m)    Wt 191 lb (86.6 kg)    LMP 10/01/2005 (Approximate)    SpO2 99%    BMI 30.37 kg/m2     General appearance - alert, pleasant  Mental status - oriented to person, place, and time  Mouth - mucous membranes moist, pharynx normal without lesions  Neck - supple, no significant adenopathy  Chest - clear to auscultation, no wheezes, rales or rhonchi, symmetric air entry  Heart - normal rate, regular rhythm, normal S1, S2, no murmurs, rubs, clicks or gallops  Abdomen - soft, non-tender to light palpation, +bowel sounds  Extremities - peripheral pulses normal, no pedal edema      LABS  Lab Results   Component Value Date/Time    WBC 8.5 06/20/2017 03:08 PM    HGB 13.1 06/20/2017 03:08 PM    HCT 39.0 06/20/2017 03:08 PM    PLATELET 202 96/80/7060 03:08 PM    MCV 88.4 06/20/2017 03:08 PM    ABS. NEUTROPHILS 3.0 05/09/2017 03:22 AM     Lab Results   Component Value Date/Time    Sodium 133 06/20/2017 03:08 PM    Potassium 4.2 06/20/2017 03:08 PM    Chloride 97 06/20/2017 03:08 PM    CO2 30 06/20/2017 03:08 PM    Glucose 102 06/20/2017 03:08 PM    BUN 15 06/20/2017 03:08 PM    Creatinine 0.63 06/20/2017 03:08 PM    GFR est AA >60 06/20/2017 03:08 PM    GFR est non-AA >60 06/20/2017 03:08 PM    Calcium 8.7 06/20/2017 03:08 PM     Lab Results   Component Value Date/Time    AST (SGOT) 12 06/20/2017 03:08 PM    Alk. phosphatase 101 06/20/2017 03:08 PM    Protein, total 6.4 06/20/2017 03:08 PM    Albumin 3.5 06/20/2017 03:08 PM    Globulin 2.9 06/20/2017 03:08 PM    A-G Ratio 1.2 06/20/2017 03:08 PM     6/20/17: CA 19-9--- 286    IMAGING    CT CAP 6/23/17  IMPRESSION: Low-attenuation mass in the body of the pancreas with obstruction of  the main pancreatic duct and atrophy of the tail of the pancreas. There is  vascular encasement of the celiac axis and splenic and hepatic arteries with  invasion and narrowing of the hepatic artery. There are lymph nodes in the  gastrohepatic ligament. The appearance is essentially unchanged in comparison  with the examination 5 weeks ago. ASSESSMENT  Ms. Katharine Montano is a 60 y.o. female with  pancreatic adenocarcinoma on CT imaging 5/8/17, T3N0 (celiac involvement) and currently unresectable as per Tumor board recommendations. Questionable 4 mm left upper lobe nodule and no clear evidence of metastasis. She presented with pancreatitis and abdominal pain, s/p Celiac block. She declined chemotherapy initially but returned 5-6 weeks after diagnosis for reconsideration. CTCAP 6/20/17 stable    Palliative Gemcitabine and abraxane 7/6/17    DISCUSSION/PLAN  1. Pancreatic adenocarcinoma Stage III unresectable. Was reviewed in Tumor Board an currently unresectable  Recommendations from Tumor board reviewed.    Discussed starting with neoadjuvant chemotherapy with or without radiation (added after a few months) previously which the patient declined. She continues to struggle with balancing longevity vs QOL if she were to start chemotherapy. She has no inclination to undergo a curative Whipples even if she were deemed to be resectable after neoadjuvant chemotherapy. Again reviewed that palliative chemotherapy does improve OS and QOL compared to placebo. Options include FOLFIRINOX, Lynn Abraxane (modified), Lynn alone, FOLFOX. Side effects reviewed in detail  Elected to move forward with Gemcitabine and Abraxane    Cycle 1 Gemcitabine and abraxane today (every 2 weeks)  Port is in place      2. Pain: Back pain from pancreatic primary, s/p celiac plx block. She would need to increase Morphine CR to 30 mg BID - Will discuss with Dr. Alisha Vail    3. Insomnia  From steroids. However would not truncate suddenly as she has been on Dexamethasone for 4 weeks.  She could taper as follows  Dexamethasone 2 mg in am only for 2 weeks then 1 mg in am for 1 week and then stop      RTC C1D15    Signed by: Satinder Cleveland MD                     July 6, 2017

## 2017-07-06 NOTE — TELEPHONE ENCOUNTER
Patients prescription of morphine requires Diagnosis code.  Requesting call back with code to fill rx

## 2017-07-06 NOTE — TELEPHONE ENCOUNTER
Call returned to patient, HIPAA verified. Patient confirmed that she does have antiemetic to take as needed for chemotherapy induced nausea/vomiting. Verbalized no questions this morning regarding treatment, will call with any needs/concerns. Thanked for call.

## 2017-07-07 NOTE — TELEPHONE ENCOUNTER
Patient called back, stating that she is feeling fine and has had no complaints since finishing her first treatment.  If you have any questions patient can be reached at [phone]

## 2017-07-08 NOTE — PROGRESS NOTES
Called Mrs. Bennett back and left her a message. UA with 25-30 WBC but no bacteria. Sent for culture. Would not start abx at this time.

## 2017-07-08 NOTE — PROGRESS NOTES
Telephone call    Called by patient because of urinary urgency. Patient completed first round of chemotherapy on 7/6. Patient with pancreatic cancer. She states had similar urgency 1 week ago and resolved when she took OTC AZO. She denies fever, chills or flank pain. Has been able to drink fluids and eat, although slight nausea post chemotherapy. It is fine for her to to take the AZO but if she starts with fever, chills, or flank pain, she needs to be evaluated by primary care office or urgent care to be sure she is not developing a urine infection. Patient then called back and said she called the infusion center and they may do a UA with an order. Called the infusion center and was able to give verbal order for UA and culture.

## 2017-07-11 NOTE — TELEPHONE ENCOUNTER
Call placed to patient, HIPAA verified. Updated on note by provider and to call with any further symptoms/fever or concerns. Patient verbalized understanding and thanked for call.

## 2017-07-11 NOTE — TELEPHONE ENCOUNTER
Called Dr. Sandee Taylor [phone] spoke with nurse Bhavana Brownlee, advised of pt's concerns and symptoms. Bhavana Brownlee state that they will call patient to check on her symptoms.

## 2017-07-11 NOTE — TELEPHONE ENCOUNTER
Facial swelling likely due to steroid use. Increase may be due to IV steroids received with chemotherapy on day 1 and the increased dose on days 2 and 3 for nausea. Encourage PO fluids. Provider who ordered UC to manage if positive. Follow up with our office with next cycle of chemotherapy. Encourage her to call with any new symptoms or fevers.

## 2017-07-11 NOTE — TELEPHONE ENCOUNTER
Call placed to patient, HIPAA verified. Patient states that she has been taking over the counter AZO for UTI symptoms with some relief and has been increasing fluid intake. States she has not yet heard results of urine culture. Reports that since chemotherapy on Thurs she has had increased bruising to bilateral lower extremities. Continues to have increased swelling to face/neck. States this has increased since office visit with provider. Requesting any suggestions/guidance. Thanked for call. Forwarded to provider to review.

## 2017-07-11 NOTE — TELEPHONE ENCOUNTER
Call received from [place]. MsReginald Darling Perez called Palliative Care office with complaints of possible UTI and swelling. Nursing, can you please reach out to her? Last chemo last Thursday. Swelling could be due to steroid use.

## 2017-07-11 NOTE — TELEPHONE ENCOUNTER
Patient is calling stating that she had her first chemo treatment on Thursday. She is having the following symptoms:    1) bruising on both legs  2)  Swollen eyes  3)  Swollen face. She did not know if this was a symptom of her chemo or if she should be worried. She is also having trouble with possible UTI. Please call to advise.

## 2017-07-12 NOTE — TELEPHONE ENCOUNTER
Urine culture ordered by palliative care. Please have patient call palliative care for results and treatment.

## 2017-07-12 NOTE — TELEPHONE ENCOUNTER
Call returned to patient. HIPAA verified. Advised of provider note. Verbalized understanding & states she will call Palliative Medicine.

## 2017-07-13 NOTE — TELEPHONE ENCOUNTER
Please advise on patients recent UA/ C&S  I believe the culture came back positive for [facility]

## 2017-07-13 NOTE — TELEPHONE ENCOUNTER
From: Dannielle Rand  Sent: 7/12/2017 4:13 PM EDT  Subject: Test Results Question    Wish to know results of Urine culture taken over the weekend. Dr. Angie Calderon was my on-call Pallative doctor. The test was taken at the infusion center on Saturday.

## 2017-07-17 NOTE — CONSULTS
Hematology/Oncology Consult    REASON FOR CONSULT: Pancreatic Cancer, Muscle cramps/leg swelling  REQUESTED BY: Dr. Dale Co: Ms. Marika Scruggs is a 60 y.o. female who presented to the Emergency Department today with leg cramping, left hand cramping, and lower extremity swelling. Leg cramps are waking her up at night. States these are \"in the back of my legs\" and noted even when she is sitting in a chair. She also endorses some \"twitching\" of her hands/arms which started last week. States her known abdominal pain and back pain has significantly improved since starting treatment and pain management plan with oxycodone every 4 hours is working well for her. She is well known to our service for management of her pancreatic cancer. History significant for allergies, GERD, and sleep apnea. She initiated treatment with Reagan/Abraxane on 7/6/17. Oncologic history  She presented to the ED on 5/8/17 with severe abdominal pain. She had it for 4-5 months in the lower abdomen. Pain radiated to the back/shoulder for which she had been trying to go to Physical Therapy to manage. This provided little relief for her. Medications over the counter had also provided little relief. Symptoms associated with fatigue and  unintentional weight loss of approximately 20 pounds since December 2017     CT abdomen/pelvis completed in the ED revealed a 2.5cm pancreatic body lesion that involved the splenic artery and celiac artery. Dr. Avery Anderson with GI performed an EUS 5/9/17 with pathology showing adenocarcinoma. LN biopsy was non diagnostic. Seen by Dr. Omar Sesay with Surgery and considered unresectable at the time. CT chest 5/11/17 showed a 4 mm lung nodule in the left upper lobe that was non specific.   CT CAP 6/20/17 stable     Palliative Chemotherapy  CA [id number] [id number]  7/6/17: Cycle 1 Reagan Abraxane       Past Medical History:   Diagnosis Date    Allergic cough 10/5/2010    AR (allergic rhinitis)     cough    GERD (gastroesophageal reflux disease)     Tx FOR \"REALLY BAD HEARTBURN\" IN 2013    Nausea & vomiting     PMS (premenstrual syndrome) 12/26/14    IN PAST, WAS SEVERE; MENOPAUSE 10 YRS AGO, PT SAYS    Postmenopause, LMP 51yo, NO HRT 10/5/2010    S/P normal colonoscopy 2004 10/5/2010    Unspecified sleep apnea 12/26/14    USES CPAP       Past Surgical History:   Procedure Laterality Date    DEXA BONE DENSITY STUDY AXIAL  2/2004    Normal; Dr Corona Russell (prev Gyn)   [address]  [address]    benign AUB; negative    ENDOSCOPY, COLON, DIAGNOSTIC  12/26/14    LAST COLONOSCOPY JAN 2014; PRIOR SCOPE 2004    HX RHINOPLASTY  1986    w/ chin implant (mentoplasty)    HX WISDOM TEETH EXTRACTION      AK OUTER EAR SURGERY PROC UNLISTED  1981    excision benign tumor behind right ear       Allergies   Allergen Reactions    Zofran [Ondansetron Hcl (Pf)] Other (comments)     Doesn't work per pt       Current Facility-Administered Medications   Medication Dose Route Frequency Provider Last Rate Last Dose    sodium chloride 0.9 % bolus infusion 1,000 mL  1,000 mL IntraVENous NOW Vinay Agarwal MD 1,000 mL/hr at 07/17/17 1554 1,000 mL at 07/17/17 1554     Current Outpatient Prescriptions   Medication Sig Dispense Refill    prochlorperazine (COMPAZINE) 10 mg tablet Take 5 mg by mouth every six (6) hours as needed.  trimethoprim-sulfamethoxazole (BACTRIM DS, SEPTRA DS) 160-800 mg per tablet Take 1 Tab by mouth two (2) times a day for 5 days. 10 Tab 0    morphine CR (MS CONTIN) 15 mg CR tablet Take 1 Tab by mouth three (3) times daily. Max Daily Amount: 45 mg. 90 Tab 0    senna-docusate (SENNA PLUS) 8.6-50 mg per tablet Take 2 Tabs by mouth three (3) times daily.  oxyCODONE IR (OXY-IR) 15 mg immediate release tablet Take 1 Tab by mouth every four (4) hours as needed. Max Daily Amount: 90 mg. 180 Tab 0    bisacodyl (DULCOLAX) 10 mg suppository Insert 10 mg into rectum daily.  30 Suppository 2    lactulose (CHRONULAC) 10 gram/15 mL solution Take 30 mL by mouth three (3) times daily. 480 mL 1    dexamethasone (DECADRON) 2 mg tablet Take one 2mg tab twice daily. One in the AM and one between 2pm and 3pm. 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 Alcohol use No      Comment: very very rare    Drug use: No    Sexual activity: Not Currently     Other Topics Concern    Occupational Exposure No     down to 1 cup of cooffee qam; was up to 3-4 cups up unitl ~ the past few weeks    Weight Concern Yes     happy she has lost 20 lbs since strict diet and exercise    Special Diet Yes     since beginning of August went on strict diet of carb cuonting, low fat, protein and calorie counts; well balanced    Exercise Yes     swims a lot and since August started walking about 2-5 x a week x 45 minutes     Social History Narrative       Family History   Problem Relation Age of Onset    Stroke Mother     Heart Disease Mother     Heart Disease Father     Arthritis-rheumatoid Sister     Cancer Brother      skin cancer    HIV/AIDS Brother     Stroke Brother 79     TIA's    Heart Disease Brother 79     CABG    Stroke Maternal Grandmother     Heart Attack Maternal Grandfather     Heart Attack Paternal Grandmother     Heart Attack Paternal Grandfather     Colon Cancer Sister     Anesth Problems Neg Hx        ROS  As per the HPI, otherwise a comprehensive ROS is negative. ECOG PS is 2. Emotional well being addressed and patient is coping well.     Physical Examination:   Visit Vitals    /72 (BP 1 Location: Left arm, BP Patient Position: At rest;Sitting)    Pulse 73    Temp 98.3 °F (36.8 °C)    Resp 16    Ht 5' 6.5\" (1.689 m)    Wt 193 lb 3.2 oz (87.6 kg)    LMP 10/01/2005 (Approximate)    SpO2 95%    BMI 30.72 kg/m2     General appearance - alert, pleasant, conversant, no distress,  at the bedside  Mental status - oriented to person, place, and time  Mouth - mucous membranes moist  Neck - supple, no significant adenopathy  Lymphatics - no palpable lymphadenopathy, no hepatosplenomegaly  Chest - clear to auscultation, no wheezes, rales or rhonchi, symmetric air entry  Heart - normal rate, regular rhythm, normal S1, S2, no murmurs, rubs, clicks or gallops  Abdomen - soft, nontender, nondistended, no masses or organomegaly, bowel sounds present  Neurological - normal speech, no focal findings or movement disorder noted  Musculoskeletal - no joint tenderness, deformity or swelling  Extremities - peripheral pulses normal, bilateral LE edema, left > right  Skin - warm, dry, intact, some bruising noted on lower extremities    LABS  Lab Results   Component Value Date/Time    WBC 5.7 07/17/2017 02:42 PM    HGB 11.4 07/17/2017 02:42 PM    HCT 34.8 07/17/2017 02:42 PM    PLATELET 560 86/09/0819 02:42 PM    MCV 90.6 07/17/2017 02:42 PM    ABS. NEUTROPHILS 4.0 07/17/2017 02:42 PM     Lab Results   Component Value Date/Time    Sodium 135 07/17/2017 02:42 PM    Potassium 4.1 07/17/2017 02:42 PM    Chloride 100 07/17/2017 02:42 PM    CO2 27 07/17/2017 02:42 PM    Glucose 134 07/17/2017 02:42 PM    BUN 11 07/17/2017 02:42 PM    Creatinine 1.04 07/17/2017 02:42 PM    GFR est AA >60 07/17/2017 02:42 PM    GFR est non-AA 54 07/17/2017 02:42 PM    Calcium 8.2 07/17/2017 02:42 PM     Lab Results   Component Value Date/Time    AST (SGOT) 20 07/17/2017 02:42 PM    Alk.  phosphatase 95 07/17/2017 02:42 PM    Protein, total 6.5 07/17/2017 02:42 PM    Albumin 3.6 07/17/2017 02:42 PM    Globulin 2.9 07/17/2017 02:42 PM    A-G Ratio 1.2 07/17/2017 02:42 PM       IMAGING  CT Results (most recent):    Results from Hospital Encounter encounter on 06/23/17   CT CHEST ABD PELV W CONT   Narrative EXAM: CT CHEST WITH CONTRAST  INDICATION: Abnormal chest x-ray. COMPARISON: 5/11/2017. CONTRAST: 100 mL of Isovue-300. TECHNIQUE:   Multislice helical CT was performed from the thoracic inlet to the adrenal  glands during uneventful rapid bolus intravenous contrast administration. Contiguous 5 mm axial images were reconstructed and lung and soft tissue windows  were generated. Coronal and sagittal reformations were generated. CT dose  reduction was achieved through use of a standardized protocol tailored for this  examination and automatic exposure control for dose modulation. FINDINGS:  There is a right jugular Port-A-Cath with tip in the superior vena cava. LOWER NECK:  The visualized portions of the thyroid and structures of the lower  neck are within normal limits. LUNGS: There is a 4 mm left upper lobe nodule which is unchanged. There is  minimal scar in the lingula. PLEURA: There is no pleural effusion or pneumothorax. AORTA: The aorta enhances normally with no evidence of aneurysm or dissection. MEDIASTINUM: There is no mediastinal or hilar adenopathy or mass. BONES AND SOFT TISSUES: The bones and soft tissues of the chest wall are normal.  UPPER ABDOMEN: See abdomen pelvis CT. Impression IMPRESSION: 4 mm left upper lobe nodule unchanged. Otherwise normal CT  examination of the chest without evidence of metastatic disease. EXAM:  CT CHEST ABD PELV W CONT Multiphasic pancreatic CT. INDICATION: Pancreatic carcinoma. COMPARISON: 5/8/2017. CONTRAST: 100 mL of Isovue-370.     TECHNIQUE:   Multislice helical CT was performed from the diaphragm to the iliac crest prior  to, during the pancreatic arterial and portal venous phases and with a delay  following uneventful rapid bolus intravenous contrast administration. Imaging  was continued through the pelvis during the portal venous phase. Oral contrast  was also administered. Contiguous 2.5 mm axial images were reconstructed. Lung  and soft tissue windows were generated. Coronal and sagittal reformations were  generated. CT dose reduction was achieved through use of a standardized protocol  tailored for this examination and automatic exposure control for dose  modulation. The unenhanced images were created with 5 mm collimation. FINDINGS:  LOWER CHEST: See chest CT. The unenhanced images demonstrate no pathological calcifications. ABDOMEN:  Liver: The liver is normal in size and contour with no focal abnormality. Gallbladder and bile ducts: There are no calcified gallstones and there is no  biliary duct dilatation. Pancreas: There is a 3.4 x 2.7 x 2.5 cm low-attenuation mass in the body of the  pancreas with atrophy of the pancreatic tail and dilatation of the pancreatic  duct in the tail of the pancreas. There is soft tissue encasement of the origin  of the celiac axis and splenic and hepatic arteries with narrowing of the  hepatic artery. Spleen: No abnormality. Adrenal glands: No abnormality. Kidneys: No abnormality. The delayed images demonstrate excretion of contrast  into the renal collecting systems without filling defect or obstruction. PELVIS:  Reproductive organs: The uterus is normal.  Bladder: No abnormality. RETROPERITONEUM: The aorta tapers normally without evidence of aneurysm. There  is no retroperitoneal adenopathy or mass. BOWEL AND MESENTERY: The visualized bowel demonstrates no abnormality. There is  no mesenteric adenopathy or mass. PERITONEUM: There is no ascites or free intraperitoneal air. BONES AND SOFT TISSUES: The bones and soft tissues of the abdominal wall are  within normal limits.     IMPRESSION: Low-attenuation mass in the body of the pancreas with obstruction of  the main pancreatic duct and atrophy of the tail of the pancreas. There is  vascular encasement of the celiac axis and splenic and hepatic arteries with  invasion and narrowing of the hepatic artery. There are lymph nodes in the  gastrohepatic ligament. The appearance is essentially unchanged in comparison  with the examination 5 weeks ago. Bilateral Venous Duplex 7/17/17  Right Leg:-  Deep venous thrombosis:           No  Superficial venous thrombosis:    No  Deep venous insufficiency:        Not examined  Superficial venous insufficiency: Not examined     Left Leg:-  Deep venous thrombosis:           No  Superficial venous thrombosis:    No  Deep venous insufficiency:        Not examined  Superficial venous insufficiency: Not examined    ASSESSMENT  Ms. Jennie Pennington is a 60 y.o. female with pancreatic cancer initiated on Redfield/Abraxane 7/6/17 with our office who presents to the Emergency Department with leg cramps and LE edema. DISCUSSION/PLAN  1. Pancreatic Cancer. , unresectable. Initiated modified Redfield/Abraxane on 7/6/17. She is due again for treatment on Thursday. Will keep this scheduled and evaluate in the office same day prior to therapy. 2. Leg cramps, bilateral. DVT excluded. Likely Abraxane induced myalgias. CK, Mag, K are normal. Recommend daily zyrtec, Ibuprofen 400mg every 6 hours x 3 days, and valium nightly. Discussed with patient, family, and Dr. Jai Conley. 3. Lower extremity edema. Related to Abraxane? Dopplers are negative. Already on steroids    Discussed with Dr. Jai Conley. Patient to be discharged home from the ED after completion of IV fluids. She has follow up scheduled with Palliative Medicine this week. Patient f/u with Dr. Wilman Mackey 7/20 as scheduled. Please call with any questions. Seen in conjunction with Duglas Bates NP.  Discussed with patient and ER physician    Signed by: Andrea Kohler MD                     July 18, 2017

## 2017-07-17 NOTE — DISCHARGE INSTRUCTIONS
We hope that we have addressed all of your medical concerns. The examination and treatment you received in the Emergency Department were for an emergent problem and were not intended as complete care. It is important that you follow up with your healthcare provider(s) for ongoing care. If your symptoms worsen or do not improve as expected, and you are unable to reach your usual health care provider(s), you should return to the Emergency Department. Today's healthcare is undergoing tremendous change, and patient satisfaction surveys are one of the many tools to assess the quality of medical care. You may receive a survey from the [employer] regarding your experience in the Emergency Department. I hope that your experience has been completely positive, particularly the medical care that I provided. As such, please participate in the survey; anything less than excellent does not meet my expectations or intentions. [facility] and [facility] participate in nationally recognized quality of care measures. If your blood pressure is greater than 120/80, as reported below, we urge that you seek medical care to address the potential of high blood pressure, commonly known as hypertension. Hypertension can be hereditary or can be caused by certain medical conditions, pain, stress, or \"white coat syndrome. \"       Please make an appointment with your health care provider(s) for follow up of your Emergency Department visit. VITALS:   Patient Vitals for the past 8 hrs:   Temp Pulse Resp BP SpO2   07/17/17 1630 - 75 16 129/70 97 %   07/17/17 1600 - 61 15 123/64 96 %   07/17/17 1241 98.3 °F (36.8 °C) 73 16 133/72 95 %          Thank you for allowing us to provide you with medical care today. We realize that you have many choices for your emergency care needs. Please choose us in the future for any continued health care needs.       Regards, Kayy Page MD    [address] [facility]   Office: [phone]            Recent Results (from the past 24 hour(s))   CBC WITH AUTOMATED DIFF    Collection Time: 07/17/17  2:42 PM   Result Value Ref Range    WBC 5.7 3.6 - 11.0 K/uL    RBC 3.84 3.80 - 5.20 M/uL    HGB 11.4 (L) 11.5 - 16.0 g/dL    HCT 34.8 (L) 35.0 - 47.0 %    MCV 90.6 80.0 - 99.0 FL    MCH 29.7 26.0 - 34.0 PG    MCHC 32.8 30.0 - 36.5 g/dL    RDW 14.8 (H) 11.5 - 14.5 %    PLATELET 952 (L) 271 - 400 K/uL    NEUTROPHILS 71 32 - 75 %    LYMPHOCYTES 20 12 - 49 %    MONOCYTES 8 5 - 13 %    EOSINOPHILS 1 0 - 7 %    BASOPHILS 0 0 - 1 %    ABS. NEUTROPHILS 4.0 1.8 - 8.0 K/UL    ABS. LYMPHOCYTES 1.1 0.8 - 3.5 K/UL    ABS. MONOCYTES 0.4 0.0 - 1.0 K/UL    ABS. EOSINOPHILS 0.0 0.0 - 0.4 K/UL    ABS. BASOPHILS 0.0 0.0 - 0.1 K/UL   METABOLIC PANEL, COMPREHENSIVE    Collection Time: 07/17/17  2:42 PM   Result Value Ref Range    Sodium 135 (L) 136 - 145 mmol/L    Potassium 4.1 3.5 - 5.1 mmol/L    Chloride 100 97 - 108 mmol/L    CO2 27 21 - 32 mmol/L    Anion gap 8 5 - 15 mmol/L    Glucose 134 (H) 65 - 100 mg/dL    BUN 11 6 - 20 MG/DL    Creatinine 1.04 (H) 0.55 - 1.02 MG/DL    BUN/Creatinine ratio 11 (L) 12 - 20      GFR est AA >60 >60 ml/min/1.73m2    GFR est non-AA 54 (L) >60 ml/min/1.73m2    Calcium 8.2 (L) 8.5 - 10.1 MG/DL    Bilirubin, total 0.8 0.2 - 1.0 MG/DL    ALT (SGPT) 61 12 - 78 U/L    AST (SGOT) 20 15 - 37 U/L    Alk. phosphatase 95 45 - 117 U/L    Protein, total 6.5 6.4 - 8.2 g/dL    Albumin 3.6 3.5 - 5.0 g/dL    Globulin 2.9 2.0 - 4.0 g/dL    A-G Ratio 1.2 1.1 - 2.2     MAGNESIUM    Collection Time: 07/17/17  2:42 PM   Result Value Ref Range    Magnesium 2.4 1.6 - 2.4 mg/dL   CK    Collection Time: 07/17/17  2:42 PM   Result Value Ref Range     26 - 192 U/L       No results found.

## 2017-07-17 NOTE — TELEPHONE ENCOUNTER
Consult    Jean Haley 1955  Reason:  Muscle Cramps after Chemotherapy  Room:  ER 12  Espinoza Fountain requesting

## 2017-07-17 NOTE — ED TRIAGE NOTES
TRIAGE NOTE: \"Around 1200 my left hand just got so twisted and was cramping. I have been having cramping in both of my legs that hurts so bad. I have pancreatic cancer and just started chemotherapy last week. \" She sees Fady for oncology and Gia Cantu for palliative care

## 2017-07-17 NOTE — ED PROVIDER NOTES
HPI Comments: 60 y.o. female with past medical history significant for pancreatic cancer who presents ambulatory from home accompanied by her  with chief complaint of leg cramping. Pt states leg and hand cramping has been ongoing throughout her life, but recently worsened after her first chemotherapy treatment. Pt states cramping is worse in her legs than her arms. Pt describes multiple episodes in which cramping was so severe that she was unable to relieve the pain by stretching or moving. Pt also notes increased bilateral leg swelling. Pt is taking a combination of Abraxane and Gemzar chemotherapy every 2 weeks. Her next treatment is in 3 days. Pt was diagnosed with stage III pancreatic cancer over the head of the pancreas with no metastases. Per pt's , pt has been more \"shakey\" since starting the chemotherapy. Pt also noticed scattered bruising over her left lower extremity. Pt specifically denies CP and SOB. There are no other acute medical concerns at this time. Social hx: former tobacco user; denies EtOH use; denies illicit drug use;   PCP: Roge Narayan DO  Oncology: Dr. Silveira Adjutant    Note written by Tricia Jamil, as dictated by Ragini Vazquez MD 2:26 PM        The history is provided by the patient and the spouse.         Past Medical History:   Diagnosis Date    Allergic cough 10/5/2010    AR (allergic rhinitis)     cough    GERD (gastroesophageal reflux disease)     Tx FOR \"REALLY BAD HEARTBURN\" IN 2013    Nausea & vomiting     PMS (premenstrual syndrome) 12/26/14    IN PAST, WAS SEVERE; MENOPAUSE 10 YRS AGO, PT SAYS    Postmenopause, LMP 51yo, NO HRT 10/5/2010    S/P normal colonoscopy 2004 10/5/2010    Unspecified sleep apnea 12/26/14    USES CPAP       Past Surgical History:   Procedure Laterality Date    DEXA BONE DENSITY STUDY AXIAL  2/2004    Normal; Dr Rigoberto Menjivar (prev Gyn)   [address] [facility]  30yo    benign AUB; negative    ENDOSCOPY, COLON, DIAGNOSTIC 12/26/14    LAST COLONOSCOPY JAN 2014; PRIOR SCOPE 2004    HX RHINOPLASTY  1986    w/ chin implant (mentoplasty)    HX WISDOM TEETH EXTRACTION      [place] OUTER EAR SURGERY PROC UNLISTED  1981    excision benign tumor behind right ear         Family History:   Problem Relation Age of Onset    Stroke Mother     Heart Disease Mother     Heart Disease Father     Arthritis-rheumatoid Sister     Cancer Brother      skin cancer    HIV/AIDS Brother     Stroke Brother 79     TIA's    Heart Disease Brother 79     CABG    Stroke Maternal Grandmother     Heart Attack Maternal Grandfather     Heart Attack Paternal Grandmother     Heart Attack Paternal Grandfather     Colon Cancer Sister     Anesth Problems Neg Hx        Social History     Social History    Marital status:      Spouse name: N/A    Number of children: 0    Years of education: N/A     Occupational History    Homemaker; 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 [address] Alcohol use No      Comment: very very rare    Drug use: No    Sexual activity: Not Currently     Other Topics Concern    Occupational Exposure No     down to 1 cup of cooffee qam; was up to 3-4 cups up unitl ~ the past few weeks    Weight Concern Yes     happy she has lost 20 lbs since strict diet and exercise    Special Diet Yes     since beginning of August went on strict diet of carb cuonting, low fat, protein and calorie counts; well balanced    Exercise Yes     swims a lot and since August started walking about 2-5 x a week x 45 minutes     Social History Narrative         ALLERGIES: Zofran [ondansetron hcl (pf)]    Review of Systems   Respiratory: Negative for shortness of breath.     Cardiovascular: Positive for leg swelling. Negative for chest pain. Musculoskeletal:        Leg cramping, hand cramping   Skin:        Bruising to left lower extremity   All other systems reviewed and are negative. Vitals:    07/17/17 1241   BP: 133/72   Pulse: 73   Resp: 16   Temp: 98.3 °F (36.8 °C)   SpO2: 95%   Weight: 87.6 kg (193 lb 3.2 oz)   Height: 5' 6.5\" (1.689 m)            Physical Exam   Constitutional: She is oriented to person, place, and time. She appears well-developed and well-nourished. No distress. HENT:   Head: Normocephalic and atraumatic. Eyes: Conjunctivae are normal.   Neck: Normal range of motion. Cardiovascular: Normal rate, regular rhythm, normal heart sounds and intact distal pulses. Exam reveals no friction rub. No murmur heard. Pulmonary/Chest: Effort normal and breath sounds normal. No respiratory distress. She has no wheezes. She has no rales. She exhibits no tenderness. Abdominal: Soft. Bowel sounds are normal. She exhibits no distension. There is no tenderness. There is no rebound and no guarding. Musculoskeletal: Normal range of motion. She exhibits edema. She exhibits no tenderness. 2+ pitting edema b/l; scattered ecchymosis to left shin   Neurological: She is alert and oriented to person, place, and time. No cranial nerve deficit. Coordination normal.   5/5 strength b/l LE   Skin: Skin is warm and dry. She is not diaphoretic. No pallor. Psychiatric: She has a normal mood and affect. Her behavior is normal.   Nursing note and vitals reviewed. MDM  Number of Diagnoses or Management Options  Spasms of the hands or feet:   Diagnosis management comments: Check electrolytes and ck for cramping- may be side effect of meds.     Check for dvt with leg swelling    bruising check plts       Amount and/or Complexity of Data Reviewed  Clinical lab tests: ordered and reviewed  Tests in the radiology section of CPT®: ordered and reviewed  Obtain history from someone other than the patient: yes ()  Discuss the patient with other providers: yes (Heme onc)    Patient Progress  Patient progress: stable    ED Course       Procedures    3:42 PM  Ultrasound negative for DVT/PE. Platelets 599.     1:01 PM  Hematologist evaluated pt at bedside. CONSULT NOTE:  5:05 PM Andrea Parish MD spoke with Dr. Garrison Smart for Hematology. Discussed available diagnostic tests and clinical findings. She is in agreement with care plans as outlined. Dr. Adeline Westfall recommends scheduling ibuprofen for 2 days, zyrtec and valium. She believes symptoms are chemotherapy-induced.  Pt and  agree with plan and will retuirn if worsening sx

## 2017-07-17 NOTE — PROCEDURES
[facility]  *** FINAL REPORT ***    Name: Nick Castano  MRN: [id number]  : [facility] #: [id number]  [address] [facility] Visit #: [id number]  Date: 2017    TYPE OF TEST: Peripheral Venous Testing    REASON FOR TEST  Limb swelling    Right Leg:-  Deep venous thrombosis:           No  Superficial venous thrombosis:    No  Deep venous insufficiency:        Not examined  Superficial venous insufficiency: Not examined    Left Leg:-  Deep venous thrombosis:           No  Superficial venous thrombosis:    No  Deep venous insufficiency:        Not examined  Superficial venous insufficiency: Not examined      INTERPRETATION/FINDINGS  PROCEDURE:  Color duplex ultrasound imaging of lower extremity veins. FINDINGS:       Right: The common femoral, deep femoral, femoral, popliteal,  posterior tibial, peroneal, and great saphenous are patent and without   evidence of thrombus;  each is fully compressible and there is no  narrowing of the flow channel on color Doppler imaging. Phasic flow  is observed in the common femoral vein. Left:   The common femoral, deep femoral, femoral, popliteal,  posterior tibial, peroneal, and great saphenous are patent and without   evidence of thrombus;  each is fully compressible and there is no  narrowing of the flow channel on color Doppler imaging. Phasic flow  is observed in the common femoral vein. IMPRESSION:  No evidence of right or left lower extremity vein  thrombosis. ADDITIONAL COMMENTS    I have personally reviewed the data relevant to the interpretation of  this  study.     TECHNOLOGIST: MARIA G Alonzo, RCS  Signed: 2017 03:30 PM    PHYSICIAN: Barbara Allen MD  Signed: 2017 10:32 AM

## 2017-07-19 NOTE — PATIENT INSTRUCTIONS
Dear Nellie Deleon ,    It was a pleasure seeing you in the Palliative Medicine Office today. This is what we talked about:     1. Your abdominal pain  -You have no pain!  -Continue long-acting morphine 15-mg every 12 hours  and oxycodone 15-mg every 4 hours as needed for breakthrough pain  -Decrease dexamethasone 2-mg to 1/2 daily in the morning after breakfast.    2. Your nausea  -This hasn't been a problem for you since you left the hospital  -Continue ondansetron 4-mg every 8 hours as needed  -I prescribed Phenergan 25-mg suppositories every 6 hours as needed as an additional anti-nausea medication    3. Your constipation  -You're moving your bowels regularly with 2 senna three times a day and Miralax or lactulose as needed    4. Your appetite  -Your appetite is great!    5. Your fatigue  -You are sleeping well at night  -Your energy is better with your improved pain control and with the dexamethasone    6. Your mood  -You feel good!  -You and your  went to [facility] several weeks ago and are looking forward to going again at the end of the month  -It's important to you to stay positive and to live each day fully     6. Your leg and hand cramps  -These have improved    7. Your intestinal gas  -This occurs frequently and is bothersome to you  -I prescribed simethicone which you can take 4 times daily as need    8.  Your cancer  -You had your first cycle of chemo 2 weeks ago and this went well without any significant side effects  -You are scheduled for your second cycle tomorrow    This is what you have shared with us about Advance Care Planning  Advance Care Planning 6/14/2017   Patient's Healthcare Decision Maker is: Named in scanned ACP document   Primary Decision Maker Name Kenan Moreno   Primary Decision Maker Phone Number [phone]   Primary Decision Maker Relationship to Patient Spouse   Secondary Decision Maker Name -   Secondary Decision Maker Relationship to Patient -   Confirm Advance Directive Yes, on file   Does the patient have other document types Do Not Resuscitate       The Palliative Medicine Team is here to support you and your family. We will see you again in 4 weeks. Our Nurse Navigator Redd Santiago or Rene Beltran will check in with you by phone before that time. If there are any concerns before that time, such as medication questions, worsening symptoms or a need to see a physician for an urgent or emergent situation; please call [phone] (COPE). A physician is also on call after our normal business hours of 8am to 5pm.     In order to serve you better, please allow up to 48 hours for prescription refills to be processed. Certain medications may require more paperwork or a written prescription that you may need to  from the office. We appreciate you letting us know of any refill requests as soon as possible. We also would like you to sign up for Oxi[employer] as well.     Sincerely,      Franklin Land MD and the Palliative Medicine Team

## 2017-07-19 NOTE — MR AVS SNAPSHOT
Visit Information Date & Time Provider Department Dept. Phone Encounter #  
 7/19/2017  1:30 PM Zahra Mahmood MD Palliative [address] [place] [address] [id number] Your Appointments 7/20/2017 10:45 AM  
Follow Up with Tania Lin MD  
[facility]) Appt Note: Infusion day, follow up [address] [facility] [address]  
[phone]  
  
   
 [address] [address] [facility] [address]  
  
    
 8/3/2017 10:45 AM  
Any with Deborah Selby  [facility]) Appt Note: infusion day, follow up [address] [facility] [address]  
[phone]  
  
   
 [address] [address] [facility] [address]  
  
    
 8/17/2017 10:15 AM  
Any with Deborah Selby  [facility]) Appt Note: infusion day, follow up [address] [place] [address]  
[phone]  
  
    
 8/31/2017 10:15 AM  
Any with Deborah Selby  [facility]) Appt Note: infusion day, follow up [address] [facility] [address]  
[phone] 9/14/2017 10:15 AM  
Any with Deborah Selby  [facility]) Appt Note: infusion ay, follow up [address] [place] [address]  
[phone]  
  
    
 9/28/2017 10:15 AM  
Any with Deborah Selby  [facility]) Appt Note: infusion day follow up [address] [place] [address]  
[phone]  
  
    
 10/12/2017 10:15 AM  
Any with Deborah Selby  [facility]) Appt Note: Infusion day, follow up [address] [facility] [address]  
[phone] Upcoming Health Maintenance Date Due Hepatitis C Screening 1955 Pneumococcal 19-64 Highest Risk (1 of 3 - PCV13) 1/20/1973 FOBT Q 1 YEAR AGE 50-75 1/20/2004 PAP AKA CERVICAL CYTOLOGY 10/12/2013 ZOSTER VACCINE AGE 60> 1/20/2014 INFLUENZA AGE 9 TO ADULT 8/1/2017 BREAST CANCER SCRN MAMMOGRAM 12/15/2018 DTaP/Tdap/Td series (2 - Td) 10/5/2020 Allergies as of 7/19/2017  Review Complete On: 7/17/2017 By: Lee Queen RN Severity Noted Reaction Type Reactions Zofran [Ondansetron Hcl (Pf)]  06/02/2017    Other (comments) Doesn't work per pt Current Immunizations  Reviewed on 7/8/2017 Name Date TDAP Vaccine 10/5/2010 Not reviewed this visit You Were Diagnosed With   
  
 Codes Comments Malignant neoplasm of body of pancreas ([facility] [address]     ICD-10-CM: C25.1 ICD-9-CM: 157.1 Vitals BP Pulse Temp Resp Height(growth percentile) Weight(growth percentile) 124/78 (BP 1 Location: Left arm, BP Patient Position: Sitting) 70 98 °F (36.7 °C) (Oral) 16 5' 6\" (1.676 m) 192 lb 11.2 oz (87.4 kg) LMP SpO2 BMI OB Status Smoking Status 10/01/2005 (Approximate) 96% 31.1 kg/m2 Postmenopausal Former Smoker Vitals History BMI and BSA Data Body Mass Index Body Surface Area  
 31.1 kg/m 2 2.02 m 2 Preferred Pharmacy Pharmacy Name Phone [id number] HELEN [address]/ Andrea Singh [facility] [phone] Your Updated Medication List  
  
   
This list is accurate as of: 7/19/17  3:08 PM.  Always use your most recent med list.  
  
  
  
  
 bisacodyl 10 mg suppository Commonly known as:  DULCOLAX Insert 10 mg into rectum daily. CLARITIN 10 mg tablet Generic drug:  loratadine Take 10 mg by mouth. dexamethasone 2 mg tablet Commonly known as:  DECADRON Take 0.5 Tabs by mouth daily (after breakfast). diazePAM 5 mg tablet Commonly known as:  VALIUM Take 1 Tab by mouth every eight (8) hours as needed (spasm). Max Daily Amount: 15 mg. ibuprofen 400 mg tablet Commonly known as:  MOTRIN Take 1 Tab by mouth every eight (8) hours as needed for Pain. lactulose 10 gram/15 mL solution Commonly known as:  Richrd Shadow Take 30 mL by mouth three (3) times daily. morphine CR 15 mg CR tablet Commonly known as:  MS CONTIN Take 1 Tab by mouth three (3) times daily. Max Daily Amount: 45 mg. Start taking on:  8/6/2017  
  
 oxyCODONE IR 15 mg immediate release tablet Commonly known as:  OXY-IR Take 1 Tab by mouth every four (4) hours as needed. Max Daily Amount: 90 mg.  
  
 prochlorperazine 10 mg tablet Commonly known as:  COMPAZINE Take 5 mg by mouth every six (6) hours as needed. RE-AZO CRANBERRY PO Take  by mouth. SENNA PLUS 8.6-50 mg per tablet Generic drug:  senna-docusate Take 2 Tabs by mouth three (3) times daily. Prescriptions Printed Refills  
 oxyCODONE IR (OXY-IR) 15 mg immediate release tablet 0 Sig: Take 1 Tab by mouth every four (4) hours as needed. Max Daily Amount: 90 mg.  
 Class: Print Route: Oral  
 morphine CR (MS CONTIN) 15 mg CR tablet 0 Starting on: 8/6/2017 Sig: Take 1 Tab by mouth three (3) times daily. Max Daily Amount: 45 mg.  
 Class: Print Route: Oral  
  
Prescriptions Sent to Pharmacy Refills  
 dexamethasone (DECADRON) 2 mg tablet 1 Sig: Take 0.5 Tabs by mouth daily (after breakfast). Class: Normal  
 Pharmacy: [facility]/pharmacy #[id number] - [address] Ph #: [phone]  Route: Oral  
  
To-Do List   
 07/20/2017 10:00 AM  
  Appointment with CAT BravoDTva Marks [facility] ([phone])  
  
 08/03/2017 10:00 AM  
  Appointment with [facility] ([phone])  
  
 08/17/2017 10:00 AM  
  Appointment with [facility] ([phone])  
  
 08/31/2017 10:00 AM  
 Appointment with [facility] ([phone])  
  
 09/14/2017 10:00 AM  
  Appointment with [facility] ([phone])  
  
 09/28/2017 10:00 AM  
  Appointment with [facility] ([phone])  
  
 10/12/2017 10:00 AM  
  Appointment with [facility] ([phone])  
  
 10/26/2017 11:00 AM  
  Appointment with [facility] ([phone]) 11/09/2017 10:00 AM  
  Appointment with [facility] ([phone])  
  
 11/22/2017 10:00 AM  
  Appointment with [facility] ([phone]) 12/06/2017 10:00 AM  
  Appointment with [address] [facility] ([phone]) Patient Instructions Dear Lynnea Kussmaul , It was a pleasure seeing you in the Palliative Medicine Office today. This is what we talked about:  
 
1. Your abdominal pain 
-You have no pain! 
-Continue long-acting morphine 15-mg every 12 hours  and oxycodone 15-mg every 4 hours as needed for breakthrough pain 
-Decrease dexamethasone 2-mg to 1/2 daily in the morning after breakfast. 
 
2. Your nausea 
-This hasn't been a problem for you since you left the hospital 
-Continue ondansetron 4-mg every 8 hours as needed 
-I prescribed Phenergan 25-mg suppositories every 6 hours as needed as an additional anti-nausea medication 3. Your constipation 
-You're moving your bowels regularly with 2 senna three times a day and Miralax or lactulose as needed 4. Your appetite 
-Your appetite is great! 
 
5. Your fatigue 
-You are sleeping well at night 
-Your energy is better with your improved pain control and with the dexamethasone 6. Your mood 
-You feel good! 
-You and your  went to [facility] several weeks ago and are looking forward to going again at the end of the month -It's important to you to stay positive and to live each day fully 6. Your leg and hand cramps 
-These have improved 7. Your intestinal gas 
-This occurs frequently and is bothersome to you 
-I prescribed simethicone which you can take 4 times daily as need 8. Your cancer 
-You had your first cycle of chemo 2 weeks ago and this went well without any significant side effects 
-You are scheduled for your second cycle tomorrow This is what you have shared with us about Advance Care Planning Advance Care Planning 6/14/2017 Patient's Healthcare Decision Maker is: Named in scanned ACP document Primary Decision Maker Name Nolan Florentino Primary Decision Maker Phone Number [phone] Primary Decision Maker Relationship to Patient Spouse Secondary Decision Maker Name - Secondary Decision Maker Relationship to Patient -  
Confirm Advance Directive Yes, on file Does the patient have other document types Do Not Resuscitate The Palliative Medicine Team is here to support you and your family. We will see you again in 4 weeks. Our Nurse Navigator David Almodovar or Edilma Coley will check in with you by phone before that time. If there are any concerns before that time, such as medication questions, worsening symptoms or a need to see a physician for an urgent or emergent situation; please call [phone] (COPE). A physician is also on call after our normal business hours of 8am to 5pm.  
 
In order to serve you better, please allow up to 48 hours for prescription refills to be processed. Certain medications may require more paperwork or a written prescription that you may need to  from the office. We appreciate you letting us know of any refill requests as soon as possible. We also would like you to sign up for Mingle[employer]alma as well. Sincerely, Whit Dubose MD and the Palliative Medicine Team 
 
 
  
Introducing hospitals & HEALTH SERVICES! Dear Leopoldo Dubois: Thank you for requesting a [employer] account. Our records indicate that you already have an active [employer] account. You can access your account anytime at [email or web address]. [email or web address] Did you know that you can access your hospital and ER discharge instructions at any time in [employer]? You can also review all of your test results from your hospital stay or ER visit. Additional Information If you have questions, please visit the Frequently Asked Questions section of the [employer] website at [email or web address] Remember, [employer] is NOT to be used for urgent needs. For medical emergencies, dial 911. Now available from your iPhone and Android! Please provide this summary of care documentation to your next provider. Your primary care clinician is listed as Richard Parker. If you have any questions after today's visit, please call [phone].

## 2017-07-19 NOTE — PROGRESS NOTES
Palliative Medicine  Nursing Note  [phone]  Fax [phone]        Clinic Office Visit  Patient Name: Andrea Colon  YOB: [birth year]      Advance Care Planning 6/14/2017   Patient's Healthcare Decision Maker is: Named in scanned ACP document   Primary Decision Maker Name Veena Sutton   Primary Decision Maker Phone Number [phone]   Primary Decision Maker Relationship to Patient Spouse   Secondary Decision Maker Name -   Secondary Decision Maker Relationship to Patient -   Confirm Advance Directive Yes, on file   Does the patient have other document types Do Not Resuscitate     Call to pharmacy to clarify Dexamethasone dose of 1mg daily after breakfast and it is okay to provide 1mg tablets. Plan per Dr. Juan Kim:  1. Your abdominal pain  -You have no pain!  -Continue long-acting morphine 15-mg every 12 hours  and oxycodone 15-mg every 4 hours as needed for breakthrough pain  -Decrease dexamethasone 2-mg to 1/2 daily in the morning after breakfast.     2. Your nausea  -This hasn't been a problem for you since you left the hospital  -Continue ondansetron 4-mg every 8 hours as needed  -I prescribed Phenergan 25-mg suppositories every 6 hours as needed as an additional anti-nausea medication     3. Your constipation  -You're moving your bowels regularly with 2 senna three times a day and Miralax or lactulose as needed     4. Your appetite  -Your appetite is great!     5. Your fatigue  -You are sleeping well at night  -Your energy is better with your improved pain control and with the dexamethasone     6. Your mood  -You feel good!  -You and your  went to [facility] several weeks ago and are looking forward to going again at the end of the month  -It's important to you to stay positive and to live each day fully      6. Your leg and hand cramps  -These have improved     7.  Your intestinal gas  -This occurs frequently and is bothersome to you  -I prescribed simethicone which you can take 4 times daily as need     8. Your cancer  -You had your first cycle of chemo 2 weeks ago and this went well without any significant side effects  -You are scheduled for your second cycle tomorrow          AVS reviewed with patient, verbalized an understanding of material discussed. Instructions given to patient to call the Palliative Medicine Office at [phone] for any questions or concerns 24 hours a day, 7 days a weeks. Follow up appointment scheduled for 4 weeks.       Viktor Elias, BSN, RN, Merged with [facility]  Palliative Medicine

## 2017-07-19 NOTE — TELEPHONE ENCOUNTER
Dexamethasone, Devin Stager from [facility] is calling regarding script for Dexamethasone. Wants to know/clarify proper dosing information.

## 2017-07-19 NOTE — PROGRESS NOTES
Palliative Medicine Office Visit  Palliative Medicine Nurse Check In  [phone]    Patient Name: Vijay Hunter  YOB: 1955      Date of Office Visit: 7/19/2017   Patient states: \"  \"    From Check In Sheet (scanned in Media):  Has a medical provider talked with you about cardiopulmonary resuscitation (CPR)? [x] Yes   [] No   [] Unable to obtain    Nurse reminder to complete or update ACP FlowSheet:    Is ACP on the Problem List?    [x] Yes    [] No  IF ACP Document is ON FILE; Nurse to place ACP on Problem List     Is there an ACP Note in Chart Review/Note? [] Yes    [] No   If NO: [address] 6/14/2017   Patient's Healthcare Decision Maker is: Named in scanned ACP document   Primary Decision Maker Name Nolan Florentino   Primary Decision Maker Phone Number [phone]   Primary Decision Maker Relationship to Patient Spouse   Secondary Decision Maker Name -   Secondary Decision Maker Relationship to Patient -   Confirm Advance Directive Yes, on file   Does the patient have other document types Do Not Resuscitate       Is there anything that we should know about you as a person in order to provide you the best care possible? Have you been to the ER, urgent care clinic since your last visit? [x] Yes   [] No   [] Unable to obtain    Have you been hospitalized since your last visit? [] Yes   [x] No   [] Unable to obtain    Have you seen or consulted any other health care providers outside of the [address] [place] [address] since your last visit?    [] Yes   [x] No   [] Unable to obtain    Functional status (describe):         Last BM: 7/19/2017      accessed (date): 7/19/2017    Bottle review (for opioid pain medication):  Medication 1: Morphine 15mg   Date filled: 7/7/2017  Directions: 1 tab by mouth 3 times daily   # filled: 90  # left: 68  # pills taking per day:3  Last dose taken:7/19/2017 noon     Medication 2: oxycone 15 mg   Date filled: 6/18/2017  Directions: 1 tab by mouth every 4 hours prn   # filled: 180  # left: 30  # pills taking per day:5  Last dose taken:7/19/2017 am

## 2017-07-19 NOTE — PROGRESS NOTES
Palliative Medicine Outpatient Services  [place]: [phone]    Patient Name: Cortney Velázquez  YOB: 1955    Date of Current Visit: 07/19/17  Location of Current Visit:    [x] [facility] Office  [] [facility] Office  [] [facility] Office  [] Home  [] Other:      Date of Initial Visit: 5/12/17  Requesting Physician: Swapna Hutchison MD  Primary Care Physician: Dao Jeong DO      SUMMARY:   Cortney Velázquez is a 60y.o. year old with a past history of pancreatic cancer, who was referred to Palliative Medicine by Dr. Adeline Westfall for symptom management and supportive care. She was diagnosed in 5/2017 after months of progressive abdominal pain, fatigue and weight loss. She's currently being treated with gemcitabine and abraxane. The patients social history includes: she's been  to [place] for 36 years. They have no children. She is a . She has a brother living in [place] and a sister in [place]. She has a personal relationship with God. She loves reading, biking, pool, documentaries, and history. Palliative Medicine is addressing the following current patient/family concerns: abdominal pain, nausea, constipation, poor appetite with early satiety, fatigue. PALLIATIVE DIAGNOSES:       ICD-10-CM ICD-9-CM    1. Abdominal pain, generalized R10.84 789.07    2. Nausea without vomiting R11.0 787.02    3. Drug-induced constipation K59.03 564.09      E980.5    4. Poor appetite R63.0 783.0    5. Neoplastic malignant related fatigue R53.0 780.79    6. Malignant neoplasm of body of pancreas (HCC) C25.1 157.1 morphine CR (MS CONTIN) 15 mg CR tablet          PLAN:     Patient Instructions     Dear Cortney Velázquez ,    It was a pleasure seeing you in the Palliative Medicine Office today. This is what we talked about:     1.  Your abdominal pain  -You have no pain!  -Continue long-acting morphine 15-mg every 12 hours  and oxycodone 15-mg every 4 hours as needed for breakthrough pain  -Decrease dexamethasone 2-mg to 1/2 daily in the morning after breakfast.    2. Your nausea  -This hasn't been a problem for you since you left the hospital  -Continue ondansetron 4-mg every 8 hours as needed  -I prescribed Phenergan 25-mg suppositories every 6 hours as needed as an additional anti-nausea medication    3. Your constipation  -You're moving your bowels regularly with 2 senna three times a day and Miralax or lactulose as needed    4. Your appetite  -Your appetite is great!    5. Your fatigue  -You are sleeping well at night  -Your energy is better with your improved pain control and with the dexamethasone    6. Your mood  -You feel good!  -You and your  went to [facility] several weeks ago and are looking forward to going again at the end of the month  -It's important to you to stay positive and to live each day fully     6. Your leg and hand cramps  -These have improved    7. Your intestinal gas  -This occurs frequently and is bothersome to you  -I prescribed simethicone which you can take 4 times daily as need    8. Your cancer  -You had your first cycle of chemo 2 weeks ago and this went well without any significant side effects  -You are scheduled for your second cycle tomorrow    This is what you have shared with us about Advance Care Planning  Advance Care Planning 6/14/2017   Patient's Healthcare Decision Maker is: Named in scanned ACP document   Primary Decision Maker Name Melodie Lowry   Primary Decision Maker Phone Number [phone]   Primary Decision Maker Relationship to Patient Spouse   Secondary Decision Maker Name -   Secondary Decision Maker Relationship to Patient -   Confirm Advance Directive Yes, on file   Does the patient have other document types Do Not Resuscitate       The Palliative Medicine Team is here to support you and your family. We will see you again in 4 weeks. Our Nurse Navigator Karla Almonte or Daren Timmons will check in with you by phone before that time.      If there are any concerns before that time, such as medication questions, worsening symptoms or a need to see a physician for an urgent or emergent situation; please call [phone] ([phone]). A physician is also on call after our normal business hours of 8am to 5pm.     In order to serve you better, please allow up to 48 hours for prescription refills to be processed. Certain medications may require more paperwork or a written prescription that you may need to  from the office. We appreciate you letting us know of any refill requests as soon as possible. We also would like you to sign up for Advanced ICU Care as well. Sincerely,      Mary Valdez MD and the Palliative Medicine Team      Counseling and Coordination: see plan       GOALS OF CARE / TREATMENT PREFERENCES:   [====Goals of Care====]  GOALS OF CARE:  Patient / health care proxy stated goals: maintain quality of life      TREATMENT PREFERENCES:   Code Status:  [] Attempt Resuscitation       [x] Do Not Attempt Resuscitation    Advance Care Planning:  Advance Care Planning 6/14/2017   Patient's Healthcare Decision Maker is: Named in scanned ACP document   Primary Decision Maker Name Sharon Alexander   Primary Decision Maker Phone Number [phone]   Primary Decision Maker Relationship to Patient Spouse   Secondary Decision Maker Name -   Secondary Decision Maker Relationship to Patient -   Confirm Advance Directive Yes, on file   Does the patient have other document types Do Not Resuscitate       Other:  (If patient appropriate for POST, consider using PALLPOST smart phrase here)    The palliative care team has discussed with patient / health care proxy about goals of care / treatment preferences for patient.  [====Goals of Care====]         PRESCRIPTIONS GIVEN:     Medications Ordered Today   Medications    DISCONTD: dexamethasone (DECADRON) 2 mg tablet     Sig: Take 1 Tab by mouth daily (after breakfast).      Dispense:  30 Tab     Refill:  1    oxyCODONE IR (OXY-IR) 15 mg immediate release tablet     Sig: Take 1 Tab by mouth every four (4) hours as needed. Max Daily Amount: 90 mg. Dispense:  180 Tab     Refill:  0     Patient with pancreatic cancer  C25.9    morphine CR (MS CONTIN) 15 mg CR tablet     Sig: Take 1 Tab by mouth three (3) times daily. Max Daily Amount: 45 mg. Dispense:  90 Tab     Refill:  0     Patient with pancreatic cancer  C25.9    dexamethasone (DECADRON) 2 mg tablet     Sig: Take 0.5 Tabs by mouth daily (after breakfast).      Dispense:  30 Tab     Refill:  1           FOLLOW UP:     Future Appointments  Date Time Provider Ben Borja   7/20/2017 10:00 AM RM [facility]   7/20/2017 10:45 AM MD Tj Fairbanks [address]ene   8/3/2017 10:00 AM RM 102C - [facility]   8/3/2017 10:45 AM Najma Andres  [facility] SCHED   8/17/2017 10:00 AM RM 102C [facility]   8/17/2017 10:15 AM SIMI Dennison   8/31/2017 10:00 AM RM 102D[facility]   8/31/2017 10:15 AM SIMI Dennison [address]ene   9/14/2017 10:00 AM RM 102C [facility]   9/14/2017 10:15 AM Najma Andres  [facility] SCHED   9/28/2017 10:00 AM RM 102B [facility]   9/28/2017 10:15 AM SIMI Dennison   10/12/2017 10:00 AM RM 102C - [facility]   10/12/2017 10:15 AM SIMI Dennison 6199   10/26/2017 11:00 AM RM 102C [facility]   11/9/2017 10:00 AM RM 102B - [facility]   11/22/2017 10:00 AM RM 102B - [facility]   12/6/2017 10:00 AM RM 102B - [facility]           PHYSICIANS INVOLVED IN CARE:   Patient Care Team:  Vito Mata DO as PCP - General (Family Practice)  Chino Ram RN as Nurse Navigator (Oncology)  Willis Crocker MD (Hematology and Oncology)  Yomaira Borrego MD as Physician (Palliative Medicine)       HISTORY:   Nursing documentation from date of visit reviewed. Reviewed patient-completed ESAS and advance care planning form. Reviewed patient record in prescription monitoring program.    CHIEF COMPLAINT: abdominal pain, nausea, constipation, poor appetite    HPI/SUBJECTIVE:    The patient is: [x] Verbal / [] Nonverbal     She had terrible muscle spasms last week, starting in he legs, then in her right hand. She went to the ED when her hand cramped because she was afraid she was having a stroke. She had her first chemo two weeks ago and it went well. She had some mild nausea and fatigue a few days later, then she started to feel better. Her pain is gone! She's taking her morphine 3 times a day with oxycodone every 5 hours which she takes on a scheduled basis. She's moving her bowels using 2 senna three times a day with Miralax or lactulose as needed. She's trying to increase the fiber in her diet. She has to watch what she eats as certain foods cause gas. Her energy is good. She sleeps about 1.5 hours at night; she gets up frequently to urinate. She drinks a lot of water due to her urinary infections and cramps. She and her  went to [facility] earlier this month and they're going again in a few weeks. Her spirits are good. Sometimes her  gets a little negative which gets in her nerves. She tries to stay positive and to live fully while she can.       Clinical Pain Assessment (nonverbal scale for nonverbal patients):   [++++ Clinical Pain Assessment++++]  [++++Pain Severity++++]: Pain: 0  [++++Pain Character++++]: aching abdominal pain; hurts to wear a bra but its not skin pain  [++++Pain Duration++++]: months with increasing severity  [++++Pain Effect++++]: affects energy  [++++Pain Factors++++]: unable to name provoking factors; morphine and oxycodone help  [++++Pain Frequency++++]: constant  [++++Pain Location++++]: lower back across abdomen and underneath both breasts  [++++ Clinical Pain Assessment++++]       FUNCTIONAL ASSESSMENT:     Palliative Performance Scale (PPS):  PPS: 80       PSYCHOSOCIAL/SPIRITUAL SCREENING:     Any spiritual / Scientology concerns:  [x] Yes /  [] No    Caregiver Burnout:  [] Yes /  [] No /  [x] No Caregiver Present      Anticipatory grief assessment:   [x] Normal  / [] Maladaptive       ESAS Anxiety: Anxiety: 0    ESAS Depression: Depression: 0       REVIEW OF SYSTEMS:     The following systems were [x] reviewed / [] unable to be reviewed  Systems: constitutional, ears/nose/mouth/throat, respiratory, gastrointestinal, genitourinary, musculoskeletal, integumentary, neurologic, psychiatric, endocrine. Positive findings noted below. Modified ESAS Completed by: provider   Fatigue: 2 Drowsiness: 2   Depression: 0 Pain: 0   Anxiety: 0 Nausea: 0   Anorexia: 0 Dyspnea: 0   Best Well-Bein Constipation: No   Other Problem (Comment): 4          PHYSICAL EXAM:     Wt Readings from Last 3 Encounters:   17 192 lb 11.2 oz (87.4 kg)   17 193 lb 3.2 oz (87.6 kg)   17 191 lb (86.6 kg)     Resp. rate 16, height 5' 6\" (1.676 m), weight 192 lb 11.2 oz (87.4 kg), last menstrual period 10/01/2005.   Last bowel movement: See Nursing Note    Constitutional: sitting in chair, no acute distress  Eyes: pupils equal, anicteric  ENMT: no nasal discharge, moist mucous membranes  Cardiovascular: regular rhythm, distal pulses intact  Respiratory: breathing not labored, symmetric  Gastrointestinal: soft non-tender, +bowel sounds  Musculoskeletal: no deformity, no tenderness to palpation  Skin: warm, dry  Neurologic: following commands, moving all extremities  Psychiatric: full affect, no hallucinations  Other:       HISTORY:     Past Medical History:   Diagnosis Date    Allergic cough 10/5/2010    AR (allergic rhinitis)     cough    GERD (gastroesophageal reflux disease) Tx FOR \"REALLY BAD HEARTBURN\" IN 2013    Nausea & vomiting     PMS (premenstrual syndrome) 12/26/14    IN PAST, WAS SEVERE; MENOPAUSE 10 YRS AGO, PT SAYS    Postmenopause, LMP 51yo, NO HRT 10/5/2010    S/P normal colonoscopy 2004 10/5/2010    Unspecified sleep apnea 12/26/14    USES CPAP      Past Surgical History:   Procedure Laterality Date    DEXA BONE DENSITY STUDY AXIAL  2/2004    Normal; Dr True [place] (prev Gyn)   [address] [facility]  34yo    benign AUB; negative    ENDOSCOPY, COLON, DIAGNOSTIC  12/26/14    LAST COLONOSCOPY JAN 2014; PRIOR SCOPE 2004    HX RHINOPLASTY  1986    w/ chin implant (mentoplasty)    HX WISDOM TEETH EXTRACTION      [place] OUTER EAR SURGERY PROC UNLISTED  1981    excision benign tumor behind right ear      Family History   Problem Relation Age of Onset   Tammy Chance Stroke Mother     Heart Disease Mother     Heart Disease Father     Arthritis-rheumatoid Sister     Cancer Brother      skin cancer    HIV/AIDS Brother     Stroke Brother 79     TIA's    Heart Disease Brother 79     CABG    Stroke Maternal Grandmother     Heart Attack Maternal Grandfather     Heart Attack Paternal Grandmother     Heart Attack Paternal Grandfather     Colon Cancer Sister     Anesth Problems Neg Hx       History reviewed, no pertinent family history. Social History   Substance Use Topics    Smoking status: Former Smoker     Years: 6.00     Quit date: 9/27/1980    Smokeless tobacco: Never Used      Comment: used to smoke about 2 packs a week from college 1974 through . [address] Bunn 38 Alcohol use No      Comment: very very rare     Allergies   Allergen Reactions    Zofran [Ondansetron Hcl (Pf)] Other (comments)     Doesn't work per pt      Current Outpatient Prescriptions   Medication Sig    loratadine (CLARITIN) 10 mg tablet Take 10 mg by mouth.  CRANBERRY FRUIT CONCENTRATE (RE-AZO CRANBERRY PO) Take  by mouth.     prochlorperazine (COMPAZINE) 10 mg tablet Take 5 mg by mouth every six (6) hours as needed.  diazePAM (VALIUM) 5 mg tablet Take 1 Tab by mouth every eight (8) hours as needed (spasm). Max Daily Amount: 15 mg.    ibuprofen (MOTRIN) 400 mg tablet Take 1 Tab by mouth every eight (8) hours as needed for Pain.  morphine CR (MS CONTIN) 15 mg CR tablet Take 1 Tab by mouth three (3) times daily. Max Daily Amount: 45 mg.    senna-docusate (SENNA PLUS) 8.6-50 mg per tablet Take 2 Tabs by mouth three (3) times daily.  oxyCODONE IR (OXY-IR) 15 mg immediate release tablet Take 1 Tab by mouth every four (4) hours as needed. Max Daily Amount: 90 mg.    dexamethasone (DECADRON) 2 mg tablet Take one 2mg tab twice daily. One in the AM and one between 2pm and 3pm.    bisacodyl (DULCOLAX) 10 mg suppository Insert 10 mg into rectum daily.  lactulose (CHRONULAC) 10 gram/15 mL solution Take 30 mL by mouth three (3) times daily. No current facility-administered medications for this visit. LAB DATA REVIEWED:     Lab Results   Component Value Date/Time    WBC 5.7 07/17/2017 02:42 PM    HGB 11.4 07/17/2017 02:42 PM    PLATELET 241 25/37/5610 02:42 PM     Lab Results   Component Value Date/Time    Sodium 135 07/17/2017 02:42 PM    Potassium 4.1 07/17/2017 02:42 PM    Chloride 100 07/17/2017 02:42 PM    CO2 27 07/17/2017 02:42 PM    BUN 11 07/17/2017 02:42 PM    Creatinine 1.04 07/17/2017 02:42 PM    Calcium 8.2 07/17/2017 02:42 PM    Magnesium 2.4 07/17/2017 02:42 PM      Lab Results   Component Value Date/Time    AST (SGOT) 20 07/17/2017 02:42 PM    Alk.  phosphatase 95 07/17/2017 02:42 PM    Protein, total 6.5 07/17/2017 02:42 PM    Albumin 3.6 07/17/2017 02:42 PM    Globulin 2.9 07/17/2017 02:42 PM     No results found for: INR, PTMR, PTP, PT1, PT2, APTT   No results found for: IRON, FE, TIBC, IBCT, PSAT, FERR        CONTROLLED SUBSTANCES SAFETY ASSESSMENT (IF ON CONTROLLED SUBSTANCES):     Reviewed opioid safety handout:  [x] Yes   [] No  24 hour opioid dose >150mg morphine equivalent/day: [] Yes   [x] No  Benzodiazepines:  [] Yes   [x] No  Sleep apnea:  [x] Yes   [] No  Urine Toxicology Testing within last 6 months:  [] Yes   [x] No  History of or new aberrant medication taking behaviors:  [] Yes   [] No            Total time:   Counseling / coordination time:   > 50% counseling / coordination?:

## 2017-07-20 PROBLEM — M79.10 MYALGIA: Status: ACTIVE | Noted: 2017-01-01

## 2017-07-20 NOTE — PROGRESS NOTES
Hematology/Oncology follow up    REASON FOR VISIT: Stage III pancreatic adenocarcinoma - unresectable. Palliative Gemcitabine+ Abraxane cycle 2, day 1    HISTORY OF PRESENT ILLNESS: Ms. Twyla Cline is a 60 y.o. female with unresectable pancreatic cancer. She declined chemotherapy initially and now presents to start after reconsideration    Oncologic history  She presented to the ED on 5/8/17 with severe abdominal pain. She had it for 4-5 months in the lower abdomen. Pain radiated to the back/shoulder for which she had been trying to go to Physical Therapy to manage. This provided little relief for her. Medications over the counter had also provided little relief. Symptoms associated with fatigue and  unintentional weight loss of approximately 20 pounds since December 2017    CT abdomen/pelvis completed in the ED revealed a 2.5cm pancreatic body lesion that involved the splenic artery and celiac artery. Dr. Sabra Escamilla with GI performed an EUS 5/9/17 with pathology showing adenocarcinoma. LN biopsy was non diagnostic. Seen by Dr. Estelle Haas with Surgery and considered unresectable at the time. CT chest 5/11/17 showed a 4 mm lung nodule in the left upper lobe that was non specific.   CT CAP 6/20/17 stable    Palliative Chemotherapy  CA 19-9 285  7/6/17: Cycle 1 [place] Abraxane  7/20/17: Kapil 2 [place]/Abraxane    Past Medical History:   Diagnosis Date    Allergic cough 10/5/2010    AR (allergic rhinitis)     cough    GERD (gastroesophageal reflux disease)     Tx FOR \"REALLY BAD HEARTBURN\" IN 2013    Nausea & vomiting     PMS (premenstrual syndrome) 12/26/14    IN PAST, WAS SEVERE; MENOPAUSE 10 YRS AGO, PT SAYS    Postmenopause, LMP 49yo, NO HRT 10/5/2010    S/P normal colonoscopy 2004 10/5/2010    Unspecified sleep apnea 12/26/14    USES CPAP       Past Surgical History:   Procedure Laterality Date    DEXA BONE DENSITY STUDY AXIAL  2/2004    Normal; Dr Mercedes Desir (prev Gyn)   [id number] [facility]  30yo    benign AUB; negative    ENDOSCOPY, COLON, DIAGNOSTIC  12/26/14    LAST COLONOSCOPY JAN 2014; PRIOR SCOPE 2004    HX RHINOPLASTY  1986    w/ chin implant (mentoplasty)    HX WISDOM TEETH EXTRACTION      [place] OUTER EAR SURGERY PROC UNLISTED  1981    excision benign tumor behind right ear       Allergies   Allergen Reactions    Zofran [Ondansetron Hcl (Pf)] Other (comments)     Doesn't work per pt       Current Outpatient Prescriptions   Medication Sig Dispense Refill    multivitamin (ONE A DAY) tablet Take 1 Tab by mouth daily.  loratadine (CLARITIN) 10 mg tablet Take 10 mg by mouth.  CRANBERRY FRUIT CONCENTRATE (RE-AZO CRANBERRY PO) Take  by mouth.  oxyCODONE IR (OXY-IR) 15 mg immediate release tablet Take 1 Tab by mouth every four (4) hours as needed. Max Daily Amount: 90 mg. 180 Tab 0    [START ON 8/6/2017] morphine CR (MS CONTIN) 15 mg CR tablet Take 1 Tab by mouth three (3) times daily. Max Daily Amount: 45 mg. 90 Tab 0    dexamethasone (DECADRON) 2 mg tablet Take 0.5 Tabs by mouth daily (after breakfast). 30 Tab 1    prochlorperazine (COMPAZINE) 10 mg tablet Take 5 mg by mouth every six (6) hours as needed.  diazePAM (VALIUM) 5 mg tablet Take 1 Tab by mouth every eight (8) hours as needed (spasm). Max Daily Amount: 15 mg. 12 Tab 0    senna-docusate (SENNA PLUS) 8.6-50 mg per tablet Take 2 Tabs by mouth three (3) times daily.  bisacodyl (DULCOLAX) 10 mg suppository Insert 10 mg into rectum daily. 30 Suppository 2    lactulose (CHRONULAC) 10 gram/15 mL solution Take 30 mL by mouth three (3) times daily.  480 mL 1     Facility-Administered Medications Ordered in Other Visits   Medication Dose Route Frequency Provider Last Rate Last Dose    sodium chloride 0.9 % injection 10 mL  10 mL IntraVENous PRN Satinder Cleveland MD        heparin (porcine) pf 500 Units  500 Units IntraVENous PRN Satinder Cleveland MD        sodium chloride (NS) flush 10-40 mL  10-40 mL IntraVENous PRN Satinder Cleveland MD       [place] gemcitabine (GEMZAR) 1,600 mg in 0.9% sodium chloride 250 mL, overfill volume 25 mL chemo infusion  1,600 mg IntraVENous ONCE Jeannine Lam MD        PACLitaxel-Protein Bound (ABRAXANE) 200 mg chemo infusion  200 mg IntraVENous ONCE Chelsy Ybarra MD        0.9% sodium chloride infusion  25 mL/hr IntraVENous CONTINUOUS Jeannine Lam MD        dexamethasone (DECADRON) 4 mg/mL injection 8 mg  8 mg IntraVENous ONCE Chelsy Ybarra MD        prochlorperazine (COMPAZINE) with saline injection 10 mg  10 mg IntraVENous Q6H PRN Chelsy Ybarra MD           Social History     Social History    Marital status:      Spouse name: N/A    Number of children: 0    Years of education: N/A     Occupational History    Homemaker; 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 [address] Alcohol use No      Comment: very very rare    Drug use: No    Sexual activity: Not Currently     Other Topics Concern    Occupational Exposure No     down to 1 cup of cooffee qam; was up to 3-4 cups up unitl ~ the past few weeks    Weight Concern Yes     happy she has lost 20 lbs since strict diet and exercise    Special Diet Yes     since beginning of August went on strict diet of carb cuonting, low fat, protein and calorie counts; well balanced    Exercise Yes     swims a lot and since August started walking about 2-5 x a week x 45 minutes     Social History Narrative       Family History   Problem Relation Age of Onset    Stroke Mother     Heart Disease Mother     Heart Disease Father     Arthritis-rheumatoid Sister     Cancer Brother      skin cancer    HIV/AIDS Brother     Stroke Brother 79     TIA's    Heart Disease Brother 79     CABG    Stroke Maternal Grandmother     Heart Attack Maternal Grandfather     Heart Attack Paternal Grandmother     Heart Attack Paternal Grandfather     Colon Cancer Sister     Anesth Problems Neg Hx        ROS  Had slight nausea for 2 days after treatment. On day 8 after treatment felt better than she had since December of last year. Energy has been great. No pain. Feeling better. Muscle cramps in back of legs and hands. This prompted a visit to ED for evaluation. Left hand cramped up and seemed like it was turning blue. Has been taking Ibuprofen with relief of symptoms. Muscle cramping currently resolved. ECOG PS is 1. Emotional well being addressed. Patient reiterates that she prefers quality over quantity. Physical Examination:   Visit Vitals    /76    Pulse 64    Temp 97.9 °F (36.6 °C)    Resp 20    Ht 5' 7\" (1.702 m)    Wt 192 lb (87.1 kg)    LMP 10/01/2005 (Approximate)    SpO2 98%    BMI 30.07 kg/m2     General appearance - alert, pleasant  Mental status - oriented to person, place, and time  Mouth - mucous membranes moist, pharynx normal without lesions  Neck - supple, no significant adenopathy  Chest - clear to auscultation, no wheezes, rales or rhonchi, symmetric air entry  Heart - normal rate, regular rhythm, normal S1, S2, no murmurs, rubs, clicks or gallops  Abdomen - soft, non-tender to light palpation, +bowel sounds  Extremities - peripheral pulses normal, no pedal edema      LABS  Lab Results   Component Value Date/Time    WBC 7.0 07/20/2017 10:13 AM    HGB 11.3 07/20/2017 10:13 AM    HCT 34.3 07/20/2017 10:13 AM    PLATELET 407 32/85/1809 10:13 AM    MCV 90.7 07/20/2017 10:13 AM    ABS.  NEUTROPHILS PENDING 07/20/2017 10:13 AM     Lab Results   Component Value Date/Time    Sodium 133 07/20/2017 10:13 AM    Potassium 4.0 07/20/2017 10:13 AM    Chloride 98 07/20/2017 10:13 AM    CO2 28 07/20/2017 10:13 AM    Glucose 132 07/20/2017 10:13 AM    BUN 14 07/20/2017 10:13 AM    Creatinine 0.74 07/20/2017 10:13 AM    GFR est AA >60 07/20/2017 10:13 AM    GFR est non-AA >60 07/20/2017 10:13 AM    Calcium 8.6 07/20/2017 10:13 AM     Lab Results   Component Value Date/Time    AST (SGOT) 20 07/17/2017 02:42 PM    Alk. phosphatase 95 07/17/2017 02:42 PM    Protein, total 6.5 07/17/2017 02:42 PM    Albumin 3.6 07/17/2017 02:42 PM    Globulin 2.9 07/17/2017 02:42 PM    A-G Ratio 1.2 07/17/2017 02:42 PM     6/20/17: CA 19-9--- 286    IMAGING    CT CAP 6/23/17  IMPRESSION: Low-attenuation mass in the body of the pancreas with obstruction of  the main pancreatic duct and atrophy of the tail of the pancreas. There is  vascular encasement of the celiac axis and splenic and hepatic arteries with  invasion and narrowing of the hepatic artery. There are lymph nodes in the  gastrohepatic ligament. The appearance is essentially unchanged in comparison  with the examination 5 weeks ago. ASSESSMENT  Ms. Darling Perez is a 60 y.o. female with  pancreatic adenocarcinoma on CT imaging 5/8/17, T3N0 (celiac involvement) and currently unresectable as per Tumor board recommendations. Questionable 4 mm left upper lobe nodule and no clear evidence of metastasis. She presented with pancreatitis and abdominal pain, s/p Celiac block. She declined chemotherapy initially but returned 5-6 weeks after diagnosis for reconsideration. CTCAP 6/20/17 stable    Palliative Gemcitabine and abraxane 7/6/17    DISCUSSION/PLAN  1. Pancreatic adenocarcinoma Stage III unresectable. Tolerating dose reduced Gemcitabine (800mg/m2)/Abraxane (100mg/m2). Feeling better than she has in a while. Labs are overall stable, will proceed with treatment today as ordered. 2. Pain: Back pain from pancreatic primary, s/p celiac plx block. Doing very well. Palliative care notes reviewed from yesterday. Denies pain today. 3. Myalgias:  ED notes reviewed. Likely due to Abraxane. Continue Ibuprofen PRN. Valium PRN at bedtime. Warm packs as needed. Call if symptoms return. Follow up in 2 weeks with cycle 2, day 15.      Signed by: Delroy Miranda MD                     July 20, 2017

## 2017-07-20 NOTE — PROGRESS NOTES
[employer]    Medical Nutrition Therapy   [phone]      Nutrition Plan and Goals    Monitor GI symptoms  Continue with current PO intake        Goals:  Weight Maintenance throughout treatment   Improve treatment tolerance   Prevent or correct nutrition deficiencies  Minimize short-term and long-term treatment side effects   Nutrition Assessment   Follow up visit. She reports eating and drinking well, denies any nausea/vomiting. She did have minimal nausea on Saturday after first chemo cycle. She has no other nutrition concerns at the moment. Weight stable. Weight Trends:  Reports weight loss of 20# since December 2016  Estimated body mass index is 30.07 kg/(m^2) as calculated from the following:    Height as of this encounter: 5' 7\" (1.702 m). Weight as of this encounter: 192 lb (87.1 kg). Ht Readings from Last 1 Encounters:   07/20/17 5' 7\" (1.702 m)     Wt Readings from Last 5 Encounters:   07/20/17 192 lb (87.1 kg)   07/20/17 192 lb (87.1 kg)   07/19/17 192 lb 11.2 oz (87.4 kg)   07/17/17 193 lb 3.2 oz (87.6 kg)   07/06/17 191 lb (86.6 kg)     Diagnosis: Stage III Pancreatic Cancer   Type of treatment: Seen back in May and declined chemotherapy. Now would like to re-evaluate possible chemotherapy. Medications: [x] Reviewed   Dietary Supplements: [x] None - previously ones listed she is no longer taking. Estimated Nutrition Needs:   Calorie Range: 1853-2118kcal/day Formula: MJS x 1.4-1.6   Protein Range: 68-85g/day  Formula: 0.8-1.0 g/kg   Fluid Needs: 2000ml      NUTRITION DIAGNOSIS    Altered GI Function related to pancreatic cancer as evidence by signs and symptoms characteristic of exocrine pancreatic insufficiency. See GI symptoms above. MONITORING and EVALUATION:   · RD contact information provided to patient if nutrition-related concerns arise  · Specific tips and techniques to facilitate interventions provided and discussed with patient.        Colin Banerjee Jannet Templeton, 65 [address], [address] [place] , [place] [address]

## 2017-07-20 NOTE — PROGRESS NOTES
1000 Pt admit to [facility] for C1D2 Abraxane/Gemzar  ambulatory in stable condition. Assessment completed. No new concerns voiced. Port accessed and flushed with positive blood return, labs drawn per order and sent for processing. Pt to MD appointment.       Chemotherapy Flowsheet 7/20/2017   Cycle C1D15   Date 7/20/2017   Drug / Regimen Abraxane/Gemzar   Pre Meds given   Notes given      Medications:  Normal Saline KVO  Decadron IV Push  Abraxane  Gemzar  Heparin Flush        1530 Pt tolerated treatment well. Port maintained positive blood return throughout treatment, flushed with positive blood return at conclusion, port flushed and de-accessed per protocol. D/c home ambulatory in no distress. Pt aware of next appointment scheduled for 8/3/17.     Patient Vitals for the past 12 hrs:   Temp Pulse Resp BP SpO2   07/20/17 1530 97.8 °F (36.6 °C) 73 18 134/79 97 %   07/20/17 1002 97.5 °F (36.4 °C) 71 18 118/69 97 %     Recent Results (from the past 12 hour(s))   CBC WITH AUTOMATED DIFF    Collection Time: 07/20/17 10:13 AM   Result Value Ref Range    WBC 7.0 3.6 - 11.0 K/uL    RBC 3.78 (L) 3.80 - 5.20 M/uL    HGB 11.3 (L) 11.5 - 16.0 g/dL    HCT 34.3 (L) 35.0 - 47.0 %    MCV 90.7 80.0 - 99.0 FL    MCH 29.9 26.0 - 34.0 PG    MCHC 32.9 30.0 - 36.5 g/dL    RDW 14.9 (H) 11.5 - 14.5 %    PLATELET 094 464 - 741 K/uL    NEUTROPHILS 65 32 - 75 %    LYMPHOCYTES 21 12 - 49 %    MONOCYTES 9 5 - 13 %    EOSINOPHILS 1 0 - 7 %    BASOPHILS 1 0 - 1 %    METAMYELOCYTES 1 (H) 0 %    MYELOCYTES 2 (H) 0 %    ABS. NEUTROPHILS 4.6 1.8 - 8.0 K/UL    ABS. LYMPHOCYTES 1.5 0.8 - 3.5 K/UL    ABS. MONOCYTES 0.6 0.0 - 1.0 K/UL    ABS. EOSINOPHILS 0.1 0.0 - 0.4 K/UL    ABS.  BASOPHILS 0.1 0.0 - 0.1 K/UL    DF MANUAL      RBC COMMENTS ANISOCYTOSIS  1+       METABOLIC PANEL, BASIC    Collection Time: 07/20/17 10:13 AM   Result Value Ref Range    Sodium 133 (L) 136 - 145 mmol/L    Potassium 4.0 3.5 - 5.1 mmol/L    Chloride 98 97 - 108 mmol/L    CO2 28 21 - 32 mmol/L    Anion gap 7 5 - 15 mmol/L    Glucose 132 (H) 65 - 100 mg/dL    BUN 14 6 - 20 MG/DL    Creatinine 0.74 0.55 - 1.02 MG/DL    BUN/Creatinine ratio 19 12 - 20      GFR est AA >60 >60 ml/min/1.73m2    GFR est non-AA >60 >60 ml/min/1.73m2    Calcium 8.6 8.5 - 10.1 MG/DL

## 2017-07-24 NOTE — TELEPHONE ENCOUNTER
Patient requesting if Dr John Baez will fill a script given from ED for Valium please advise. Triage for Controlled Substance Refill Request    Pain Diagnosis: _Malignant neoplasm of pancreas [facility])    Last Outpatient Visit: _7/19/2017    Next Outpatient Visit: _8/16/2017    Reason for refill needed outside of office visit?- ED Prescribed     -Pain escalation requiring increased medication- no    -Appointment not scheduled prior to need for scheduled refill- no   -Appointment scheduled but missed or moved prior to need for scheduled refill- no     Pharmacy: _[id number] [id number] [facility]    Medication:diazePAM (VALIUM) 5 mg tablet    Dose and directions: Take 1 Tab by mouth every eight (8) hours as needed (spasm).   Number dispensed:12 Tab  Date filled ( or Pharmacy):7/17/2017   # left:4       reviewed: _yes     Date of Urine Drug Screen:  _n/a    Opioid Safety Handout given:  _yes     Appropriate for refill:  _please advise     Action:  _

## 2017-07-24 NOTE — TELEPHONE ENCOUNTER
----- Message from Dalila Bennett sent at 7/24/2017 12:04 PM EDT -----  Regarding: Prescription Question  Contact: [phone]  Update:  Doing great since my recent visit with you. Sleeping well. Cramping is manageable. I only have 4 Valium left. May I have another prescription for this? It helps me rest at night as well as helps with muscle spasms. This was ordered when I went to emergency room for spasms the other day.

## 2017-07-31 NOTE — TELEPHONE ENCOUNTER
Return call was made to Patient. Patient stated that the diarrhea has decreased today was 2 very small episodes in the AM. She said that she is a little tired and has been resting. She stated that she has been feeling like this since her last Chemo treatment. She was told this is usually a side effect of the treatment. She wanted to know if she should miss her next treatment if she's not feeling well. Patient was encouraged to keep her appointment and if anything changes to make sure she calls within 24hrs. She is encouraged to continue to hydrate herself drink the gatorade and eat the broth. She did not complain of any increased pain. She was reminded to follow up with office if there are any questions or concerns.

## 2017-07-31 NOTE — TELEPHONE ENCOUNTER
Patient is calling requesting help. States she has not felt well all weekend and today. Feels terrible. States she feels \"Unwell\",  States she has no fever just feels bad. She has had diarrhea and nausea. She had chemo on July 20 and is due for next on Aug 3. Advised pt nurse would call to discuss.

## 2017-08-03 NOTE — PROGRESS NOTES
1000 Pt admit to [facility] for C2D1 Abraxane/Gemzar  ambulatory in stable condition. Assessment completed. No new concerns voiced. Port accessed and flushed with positive blood return, labs drawn per order and sent for processing. Pt to MD appointment.          Chemotherapy Flowsheet 8/3/2017   Cycle C2D1   Date 8/3/2017   Drug / Regimen abraxane/gemzar   Pre Meds given   Notes given       Medications:  Normal Saline KVO  Decadron IV Push  Abraxane  Gemzar  Heparin Flush        1500 Pt tolerated treatment well. Port maintained positive blood return throughout treatment, flushed with positive blood return at conclusion, port flushed and de-accessed per protocol. D/c home ambulatory in no distress. Pt aware of next appointment scheduled for 08/17     Patient Vitals for the past 12 hrs:   Temp Pulse Resp BP SpO2   08/03/17 1500 98 °F (36.7 °C) 78 16 122/70 99 %   08/03/17 0942 96.6 °F (35.9 °C) 71 18 139/83 99 %     Recent Results (from the past 12 hour(s))   CBC WITH AUTOMATED DIFF    Collection Time: 08/03/17  9:45 AM   Result Value Ref Range    WBC 6.2 3.6 - 11.0 K/uL    RBC 3.71 (L) 3.80 - 5.20 M/uL    HGB 11.2 (L) 11.5 - 16.0 g/dL    HCT 34.0 (L) 35.0 - 47.0 %    MCV 91.6 80.0 - 99.0 FL    MCH 30.2 26.0 - 34.0 PG    MCHC 32.9 30.0 - 36.5 g/dL    RDW 15.9 (H) 11.5 - 14.5 %    PLATELET 074 (L) 361 - 400 K/uL    NEUTROPHILS 57 32 - 75 %    LYMPHOCYTES 30 12 - 49 %    MONOCYTES 10 5 - 13 %    EOSINOPHILS 2 0 - 7 %    BASOPHILS 1 0 - 1 %    ABS. NEUTROPHILS 3.6 1.8 - 8.0 K/UL    ABS. LYMPHOCYTES 1.8 0.8 - 3.5 K/UL    ABS. MONOCYTES 0.6 0.0 - 1.0 K/UL    ABS. EOSINOPHILS 0.1 0.0 - 0.4 K/UL    ABS.  BASOPHILS 0.0 0.0 - 0.1 K/UL   METABOLIC PANEL, BASIC    Collection Time: 08/03/17  9:45 AM   Result Value Ref Range    Sodium 134 (L) 136 - 145 mmol/L    Potassium 3.8 3.5 - 5.1 mmol/L    Chloride 100 97 - 108 mmol/L    CO2 27 21 - 32 mmol/L    Anion gap 7 5 - 15 mmol/L    Glucose 119 (H) 65 - 100 mg/dL    BUN 9 6 - 20 MG/DL Creatinine 0.57 0.55 - 1.02 MG/DL    BUN/Creatinine ratio 16 12 - 20      GFR est AA >60 >60 ml/min/1.73m2    GFR est non-AA >60 >60 ml/min/1.73m2    Calcium 8.9 8.5 - 10.1 MG/DL   HEPATIC FUNCTION PANEL    Collection Time: 08/03/17  9:45 AM   Result Value Ref Range    Protein, total 6.5 6.4 - 8.2 g/dL    Albumin 3.6 3.5 - 5.0 g/dL    Globulin 2.9 2.0 - 4.0 g/dL    A-G Ratio 1.2 1.1 - 2.2      Bilirubin, total 0.9 0.2 - 1.0 MG/DL    Bilirubin, direct 0.2 0.0 - 0.2 MG/DL    Alk.  phosphatase 81 45 - 117 U/L    AST (SGOT) 23 15 - 37 U/L    ALT (SGPT) 60 12 - 78 U/L

## 2017-08-03 NOTE — PROGRESS NOTES
Hematology/Oncology follow up    REASON FOR VISIT: Stage III pancreatic adenocarcinoma - unresectable. Palliative Gemcitabine+ Abraxane cycle 2, day 1    HISTORY OF PRESENT ILLNESS: Ms. Ernestene Phalen is a 60 y.o. female with unresectable pancreatic cancer. She declined chemotherapy initially and now presents to start after reconsideration    Oncologic history  She presented to the ED on 5/8/17 with severe abdominal pain. She had it for 4-5 months in the lower abdomen. Pain radiated to the back/shoulder for which she had been trying to go to Physical Therapy to manage. This provided little relief for her. Medications over the counter had also provided little relief. Symptoms associated with fatigue and  unintentional weight loss of approximately 20 pounds since December 2017    CT abdomen/pelvis completed in the ED revealed a 2.5cm pancreatic body lesion that involved the splenic artery and celiac artery. Dr. Ramu Frances with GI performed an EUS 5/9/17 with pathology showing adenocarcinoma. LN biopsy was non diagnostic. Seen by Dr. Kati Gómez with Surgery and considered unresectable at the time. CT chest 5/11/17 showed a 4 mm lung nodule in the left upper lobe that was non specific.   CT CAP 6/20/17 stable    Palliative Chemotherapy  CA 19-9 285  7/6/17: Cycle 1 [place] Abraxane  7/20/17: Kapil 2 [place]/Abraxane    Past Medical History:   Diagnosis Date    Allergic cough 10/5/2010    AR (allergic rhinitis)     cough    GERD (gastroesophageal reflux disease)     Tx FOR \"REALLY BAD HEARTBURN\" IN 2013    Nausea & vomiting     PMS (premenstrual syndrome) 12/26/14    IN PAST, WAS SEVERE; MENOPAUSE 10 YRS AGO, PT SAYS    Postmenopause, LMP 49yo, NO HRT 10/5/2010    S/P normal colonoscopy 2004 10/5/2010    Unspecified sleep apnea 12/26/14    USES CPAP       Past Surgical History:   Procedure Laterality Date    DEXA BONE DENSITY STUDY AXIAL  2/2004    Normal; Dr Bert Marquez (prev Gyn)   [id number] [facility]  30yo    benign AUB; negative    ENDOSCOPY, COLON, DIAGNOSTIC  12/26/14    LAST COLONOSCOPY JAN 2014; PRIOR SCOPE 2004    HX RHINOPLASTY  1986    w/ chin implant (mentoplasty)    HX WISDOM TEETH EXTRACTION      [place] OUTER EAR SURGERY PROC UNLISTED  1981    excision benign tumor behind right ear       Allergies   Allergen Reactions    Zofran [Ondansetron Hcl (Pf)] Other (comments)     Doesn't work per pt       Current Outpatient Prescriptions   Medication Sig Dispense Refill    polyethylene glycol (MIRALAX) 17 gram/dose powder Take 17 g by mouth as needed.  lactobacillus rhamnosus gg 10 billion cell (CULTURELLE) 10 billion cell capsule Take 2 Caps by mouth daily.  diazePAM (VALIUM) 5 mg tablet Take 1 Tab by mouth every eight (8) hours as needed (spasm). Max Daily Amount: 15 mg. 30 Tab 0    multivitamin (ONE A DAY) tablet Take 1 Tab by mouth daily.  loratadine (CLARITIN) 10 mg tablet Take 10 mg by mouth.  CRANBERRY FRUIT CONCENTRATE (RE-AZO CRANBERRY PO) Take  by mouth.  oxyCODONE IR (OXY-IR) 15 mg immediate release tablet Take 1 Tab by mouth every four (4) hours as needed. Max Daily Amount: 90 mg. 180 Tab 0    [START ON 8/6/2017] morphine CR (MS CONTIN) 15 mg CR tablet Take 1 Tab by mouth three (3) times daily. Max Daily Amount: 45 mg. 90 Tab 0    dexamethasone (DECADRON) 2 mg tablet Take 0.5 Tabs by mouth daily (after breakfast). 30 Tab 1    prochlorperazine (COMPAZINE) 10 mg tablet Take 5 mg by mouth every six (6) hours as needed.  senna-docusate (SENNA PLUS) 8.6-50 mg per tablet Take 2 Tabs by mouth three (3) times daily.  lactulose (CHRONULAC) 10 gram/15 mL solution Take 30 mL by mouth three (3) times daily. 480 mL 1    bisacodyl (DULCOLAX) 10 mg suppository Insert 10 mg into rectum daily.  30 Suppository 2     Facility-Administered Medications Ordered in Other Visits   Medication Dose Route Frequency Provider Last Rate Last Dose    sodium chloride 0.9 % injection 10 mL  10 mL IntraVENous PRN Nataly Gómez MD   10 mL at 08/03/17 0957    heparin (porcine) pf 500 Units  500 Units IntraVENous PRN Nataly Gómez MD        sodium chloride (NS) flush 10-40 mL  10-40 mL IntraVENous PRN Nataly Gómez MD   10 mL at 08/03/17 0957    ondansetron (ZOFRAN) injection 8 mg  8 mg IntraVENous ONCE Nataly Gómez MD        gemcitabine (GEMZAR) 1,600 mg in 0.9% sodium chloride 250 mL, overfill volume 25 mL chemo infusion  1,600 mg IntraVENous ONCE Jeannine Lam MD        PACLitaxel-Protein Bound (ABRAXANE) 200 mg chemo infusion  200 mg IntraVENous ONCE Nataly Gómez MD        0.9% sodium chloride infusion  25 mL/hr IntraVENous CONTINUOUS Jeannine Lam MD        dexamethasone (DECADRON) 4 mg/mL injection 8 mg  8 mg IntraVENous ONCE Nataly Gómez MD        prochlorperazine (COMPAZINE) with saline injection 10 mg  10 mg IntraVENous Q6H PRN Nataly Gómez MD           Social History     Social History    Marital status:      Spouse name: N/A    Number of children: 0    Years of education: N/A     Occupational History    Homemaker; 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 [address] Alcohol use No      Comment: very very rare    Drug use: No    Sexual activity: Not Currently     Other Topics Concern    Occupational Exposure No     down to 1 cup of cooffee qam; was up to 3-4 cups up unitl ~ the past few weeks    Weight Concern Yes     happy she has lost 20 lbs since strict diet and exercise    Special Diet Yes     since beginning of August went on strict diet of carb cuonting, low fat, protein and calorie counts; well balanced    Exercise Yes     swims a lot and since August started walking about 2-5 x a week x 45 minutes     Social History Narrative Family History   Problem Relation Age of Onset   Lolita London Stroke Mother     Heart Disease Mother     Heart Disease Father     Arthritis-rheumatoid Sister     Cancer Brother      skin cancer    HIV/AIDS Brother     Stroke Brother 79     TIA's    Heart Disease Brother 79     CABG    Stroke Maternal Grandmother     Heart Attack Maternal Grandfather     Heart Attack Paternal Grandmother     Heart Attack Paternal Grandfather     Colon Cancer Sister     Anesth Problems Neg Hx        ROS  Friday, Sat, Sunday a week after treatment felt terrible with weakness and diarrhea. Her hair fell out during this time frame. Unable to make her beach trip. Headachy during this time. Slept a lot during the weekend. Called on Monday, thought to be due to chemotherapy side effects. She did change her diet to liquid diet and soft foods during this time and feels that this improved her symptoms. Didn't take any OTC medication for the diarrhea. Pain has been controlled. No current issue with nausea or vomiting. Has been feeling great in the last 2-3 days. ECOG PS is 1. Emotional well being addressed. Patient reiterates that she prefers quality over quantity.     Physical Examination:   Visit Vitals    /77    Pulse 74    Temp 98.1 °F (36.7 °C)    Resp 16    Ht 5' 7\" (1.702 m)    Wt 193 lb (87.5 kg)    LMP 10/01/2005 (Approximate)    SpO2 98%    BMI 30.23 kg/m2     General appearance - alert, pleasant  Mental status - oriented to person, place, and time  Mouth - mucous membranes moist, pharynx normal without lesions  Neck - supple, no significant adenopathy  Chest - clear to auscultation, no wheezes, rales or rhonchi, symmetric air entry  Heart - normal rate, regular rhythm, normal S1, S2, no murmurs, rubs, clicks or gallops  Abdomen - soft, non-tender to light palpation, +bowel sounds  Extremities - peripheral pulses normal, no pedal edema      LABS  Lab Results   Component Value Date/Time    WBC 6.2 08/03/2017 09:45 AM    HGB 11.2 08/03/2017 09:45 AM    HCT 34.0 08/03/2017 09:45 AM    PLATELET 841 17/80/1281 09:45 AM    MCV 91.6 08/03/2017 09:45 AM    ABS. NEUTROPHILS 3.6 08/03/2017 09:45 AM     Lab Results   Component Value Date/Time    Sodium 134 08/03/2017 09:45 AM    Potassium 3.8 08/03/2017 09:45 AM    Chloride 100 08/03/2017 09:45 AM    CO2 27 08/03/2017 09:45 AM    Glucose 119 08/03/2017 09:45 AM    BUN 9 08/03/2017 09:45 AM    Creatinine 0.57 08/03/2017 09:45 AM    GFR est AA >60 08/03/2017 09:45 AM    GFR est non-AA >60 08/03/2017 09:45 AM    Calcium 8.9 08/03/2017 09:45 AM     Lab Results   Component Value Date/Time    AST (SGOT) 23 08/03/2017 09:45 AM    Alk. phosphatase 81 08/03/2017 09:45 AM    Protein, total 6.5 08/03/2017 09:45 AM    Albumin 3.6 08/03/2017 09:45 AM    Globulin 2.9 08/03/2017 09:45 AM    A-G Ratio 1.2 08/03/2017 09:45 AM     6/20/17: CA 19-9--- 286    IMAGING    CT CAP 6/23/17  IMPRESSION: Low-attenuation mass in the body of the pancreas with obstruction of  the main pancreatic duct and atrophy of the tail of the pancreas. There is  vascular encasement of the celiac axis and splenic and hepatic arteries with  invasion and narrowing of the hepatic artery. There are lymph nodes in the  gastrohepatic ligament. The appearance is essentially unchanged in comparison  with the examination 5 weeks ago. ASSESSMENT  Ms. Naomy Brice is a 60 y.o. female with  pancreatic adenocarcinoma on CT imaging 5/8/17, T3N0 (celiac involvement) and currently unresectable as per Tumor board recommendations. Questionable 4 mm left upper lobe nodule and no clear evidence of metastasis. She presented with pancreatitis and abdominal pain, s/p Celiac block. She declined chemotherapy initially but returned 5-6 weeks after diagnosis for reconsideration. CTCAP 6/20/17 stable    Palliative Gemcitabine and abraxane 7/6/17    DISCUSSION/PLAN  1. Pancreatic adenocarcinoma Stage III unresectable.   Tolerating dose reduced Gemcitabine (800mg/m2)/Abraxane (100mg/m2). Feeling better overall. Add Ca 19.9 to labs today. Monitor disease with repeat scans after cycle 3/4.      2. Pain: Back pain from pancreatic primary, s/p celiac plx block. Doing very well. Palliative care notes reviewed. Pain is improved/stable. 3. Myalgias:  Improved. 4. Chemotherapy induced nausea without vomiting. Add Compazine premedication today. Continue Dexamethasone at home dosing. Follow up in 2 weeks with cycle 2, day 15.      Patient was seen with Rachell Hanley NP      Signed by: Farida Ochoa MD                     August 4, 2017

## 2017-08-14 NOTE — TELEPHONE ENCOUNTER
Called patient to advise/confirm upcoming appt with Dr. Anita Urbina on   8/16/17   at  10:30am  At [facility]. No answer so left voicemail. Also advised to please bring in your Drivers License and Insurance Card with you to appointment as well as any prescription pain medication in the original container with you to appt.

## 2017-08-16 NOTE — MR AVS SNAPSHOT
Visit Information Date & Time Provider Department Dept. Phone Encounter #  
 8/16/2017 10:30 AM Anthony Estrella MD Palliative [address] [id number] Your Appointments 8/31/2017  9:30 AM  
Follow Up with Darylene Pike, MD  
[address] [facility] Oncology at [facility]) Appt Note: infusion day, follow up; infusion day, follow up [address] [employer] [address] [address], [place]  
[phone]  
  
   
 [address] [address]  
  
    
 9/14/2017 10:15 AM  
Any with Abhijit Rod  [facility] Oncology [facility]) Appt Note: infusion ay, follow up [address] [address] [address], [place]  
[phone]  
  
   
 [address] [place] [address]  
  
    
 9/28/2017 10:15 AM  
Any with Abhijit Rod  [facility]) Appt Note: infusion day follow up [address] [employer] [address] [address], [place]  
[phone]  
  
    
 10/12/2017 10:15 AM  
Any with Abhijit Rod  [facility]cology [facility]) Appt Note: Infusion day, follow up [address] [employer] [address] [address], [place]  
[phone] Upcoming Health Maintenance Date Due Hepatitis C Screening 1955 Pneumococcal 19-64 Highest Risk (1 of 3 - PCV13) 1/20/1973 FOBT Q 1 YEAR AGE 50-75 1/20/2004 PAP AKA CERVICAL CYTOLOGY 10/12/2013 ZOSTER VACCINE AGE 60> 11/20/2013 INFLUENZA AGE 9 TO ADULT 8/1/2017 BREAST CANCER SCRN MAMMOGRAM 12/15/2018 DTaP/Tdap/Td series (2 - Td) 10/5/2020 Allergies as of 8/16/2017  Review Complete On: 8/16/2017 By: Andrew Wilson LPN No Known Allergies Current Immunizations  Reviewed on 8/3/2017 Name Date TDAP Vaccine 10/5/2010 Not reviewed this visit Vitals BP Pulse Temp Resp Height(growth percentile) Weight(growth percentile) 131/66 (BP 1 Location: Left arm, BP Patient Position: Sitting) 67 96.2 °F (35.7 °C) (Oral) 16 5' 7\" (1.702 m) 196 lb (88.9 kg) LMP SpO2 BMI OB Status Smoking Status 10/01/2005 (Approximate) 97% 30.7 kg/m2 Postmenopausal Former Smoker BMI and BSA Data Body Mass Index Body Surface Area 30.7 kg/m 2 2.05 m 2 Preferred Pharmacy Pharmacy Name Phone [id number] [address]/ [address] [facility] [phone] Your Updated Medication List  
  
   
This list is accurate as of: 8/16/17 12:51 PM.  Always use your most recent med list.  
  
  
  
  
 bisacodyl 10 mg suppository Commonly known as:  DULCOLAX Insert 10 mg into rectum daily. CLARITIN 10 mg tablet Generic drug:  loratadine Take 10 mg by mouth. CULTURELLE 10 billion cell capsule Generic drug:  lactobacillus rhamnosus gg 10 billion cell Take 2 Caps by mouth daily. dexamethasone 2 mg tablet Commonly known as:  DECADRON Take 0.5 Tabs by mouth daily (after breakfast). diazePAM 5 mg tablet Commonly known as:  VALIUM Take 1 Tab by mouth every eight (8) hours as needed (spasm). Max Daily Amount: 15 mg.  
  
 lactulose 10 gram/15 mL solution Commonly known as:  Mayte [place] Take 30 mL by mouth three (3) times daily. MIRALAX 17 gram/dose powder Generic drug:  polyethylene glycol Take 17 g by mouth as needed. morphine CR 15 mg CR tablet Commonly known as:  MS CONTIN Take 1 Tab by mouth three (3) times daily. Max Daily Amount: 45 mg.  
  
 multivitamin tablet Commonly known as:  ONE A DAY Take 1 Tab by mouth daily. oxyCODONE IR 15 mg immediate release tablet Commonly known as:  OXY-IR Take 1 Tab by mouth every four (4) hours as needed. Max Daily Amount: 90 mg.  
  
 prochlorperazine 10 mg tablet Commonly known as:  COMPAZINE Take 5 mg by mouth every six (6) hours as needed.   
  
 RE-AZO CRANBERRY PO  
 Take  by mouth. SENNA PLUS 8.6-50 mg per tablet Generic drug:  senna-docusate Take 2 Tabs by mouth three (3) times daily. Patient Instructions Dear Rehana Kahn , It was a pleasure seeing you in the Palliative Medicine Office today. This is what we talked about:  
 
1. Your abdominal pain 
-Your pain is under good control 
-Continue long-acting morphine 15-mg every 12 hours  and oxycodone 15-mg every 4 hours as needed for breakthrough pain. You take about 4 of these a day 
-Continue dexamethasone 2-mg daily in the morning after breakfast. 
 
2. Your nausea 
-This hasn't been a problem for you since you left the hospital 
-Continue ondansetron 4-mg every 8 hours as needed 
-I prescribed Phenergan 25-mg suppositories every 6 hours as needed as an additional anti-nausea medication 3. Your constipation 
-You're moving your bowels regularly with 2 senna three times a day and Miralax or lactulose as needed 
-You take pro-biotics twice a day 4. Your appetite 
-Your appetite is good 
-You had a lot of side effects from Creon 5. Your fatigue 
-You've had less energy since you started chemotherapy 
-You sleep several hours ar night, then wake up to eat a little something and to take a pain pill and may be up for awhile doing housework or the like 
-You are OK with this sleep pattern 6. Your mood 
-You've had a hard time with chemo with side effects that have affected your quality of life 
-You met today with one of our clinical social workers, [employer], to talk through your feelings 
-You plan on seeing her on an as needed basis 
-It's important to you to stay positive and to live each day fully 7. Your cancer 
-You've decided to stop chemo to concentrate on your quality of life This is what you have shared with us about Advance Care Planning Advance Care Planning 6/14/2017 Patient's Healthcare Decision Maker is: Named in scanned ACP document Primary Decision Maker Name Kenan Moreno Primary Decision Maker Phone Number [phone] Primary Decision Maker Relationship to Patient Spouse Secondary Decision Maker Name - Secondary Decision Maker Relationship to Patient -  
Confirm Advance Directive Yes, on file Does the patient have other document types Do Not Resuscitate The Palliative Medicine Team is here to support you and your family. We will see you again in 4 weeks. Our Nurse Navigator Lenora Rees or Daryl Pelletier will check in with you by phone before that time. If there are any concerns before that time, such as medication questions, worsening symptoms or a need to see a physician for an urgent or emergent situation; please call [phone] (COPE). A physician is also on call after our normal business hours of 8am to 5pm.  
 
In order to serve you better, please allow up to 48 hours for prescription refills to be processed. Certain medications may require more paperwork or a written prescription that you may need to  from the office. We appreciate you letting us know of any refill requests as soon as possible. We also would like you to sign up for [email or web address] as well. Sincerely, Eliceo Estrada MD and the Palliative Medicine Team 
 
 
  
Introducing [facility]! Dear Kaylen Goodrich: Thank you for requesting a [email or web address] account. Our records indicate that you already have an active [email or web address] account. You can access your account anytime at [email or web address] Did you know that you can access your hospital and ER discharge instructions at any time in [email or web address]? You can also review all of your test results from your hospital stay or ER visit. Additional Information If you have questions, please visit the Frequently Asked Questions section of the [email or web address] website at [email or web address] Remember, [email or web address] is NOT to be used for urgent needs. For medical emergencies, dial 911. Now available from your iPhone and Android! Please provide this summary of care documentation to your next provider. Your primary care clinician is listed as Maddy Chairez. If you have any questions after today's visit, please call [phone].

## 2017-08-16 NOTE — PATIENT INSTRUCTIONS
Jony Alvarado ,    It was a pleasure seeing you in the Palliative Medicine Office today. This is what we talked about:     1. Your abdominal pain  -Your pain is under good control  -Continue long-acting morphine 15-mg every 12 hours  and oxycodone 15-mg every 4 hours as needed for breakthrough pain. You take about 4 of these a day  -Continue dexamethasone 2-mg daily in the morning after breakfast.    2. Your nausea  -This hasn't been a problem for you since you left the hospital  -Continue ondansetron 4-mg every 8 hours as needed  -I prescribed Phenergan 25-mg suppositories every 6 hours as needed as an additional anti-nausea medication    3. Your constipation  -You're moving your bowels regularly with 2 senna three times a day and Miralax or lactulose as needed  -You take pro-biotics twice a day    4. Your appetite  -Your appetite is good  -You had a lot of side effects from Creon     5. Your fatigue  -You've had less energy since you started chemotherapy  -You sleep several hours ar night, then wake up to eat a little something and to take a pain pill and may be up for awhile doing housework or the like  -You are OK with this sleep pattern    6. Your mood  -You've had a hard time with chemo with side effects that have affected your quality of life  -You met today with one of our clinical social workers, Javier Magaña, to talk through your feelings  -You plan on seeing her on an as needed basis  -It's important to you to stay positive and to live each day fully     7.  Your cancer  -You've decided to stop chemo to concentrate on your quality of life    This is what you have shared with us about [address]. Planning 6/14/2017   Patient's Healthcare Decision Maker is: Named in scanned ACP document   Primary Decision Maker Name Annabelle Jiménez   Primary Decision Maker Phone Number [phone]   Primary Decision Maker Relationship to Patient Spouse   Secondary Decision Maker Name - Secondary Decision Maker Relationship to Patient -   Confirm Advance Directive Yes, on file   Does the patient have other document types Do Not Resuscitate       The Palliative Medicine Team is here to support you and your family. We will see you again in 4 weeks. Our Nurse Navigator Evan Min or Donovan Callaway will check in with you by phone before that time. If there are any concerns before that time, such as medication questions, worsening symptoms or a need to see a physician for an urgent or emergent situation; please call [phone] (COPE). A physician is also on call after our normal business hours of 8am to 5pm.     In order to serve you better, please allow up to 48 hours for prescription refills to be processed. Certain medications may require more paperwork or a written prescription that you may need to  from the office. We appreciate you letting us know of any refill requests as soon as possible. We also would like you to sign up for [employer] as well.     Sincerely,      Alyse Peralta MD and the Palliative Medicine Team

## 2017-08-16 NOTE — TELEPHONE ENCOUNTER
Patient called and cancelled appointment for tomorrow and stated she no longer wanted to have chemo.

## 2017-08-16 NOTE — TELEPHONE ENCOUNTER
Can keep scheduled appt with Dr. Kathleen Esquivel on 8/31/17. [address] [facility] office: please cancel chemo f/u appointments with me.

## 2017-08-16 NOTE — TELEPHONE ENCOUNTER
Call placed to patient, HIPAA verified. Patient tearful on the phone, patient verbalized that she does not wish to continue any more chemotherapy. She had contacted [facility] center and cancelled all future appointments at this time. Patient states that she \"cannot take it anymore, I cannot take the way that it makes me feel, I don't want to go to any more doctors appointments, I don't want to do this, I should have never started. \"  Support provided, writer encouraged patient to come in to office to discuss with provider, have labs drawn, speak about feelings and options. Patient states that she does not wish to come in at all at this time. \"maybe in the future I can come in and discuss, I do not with to talk about it anymore at this time\". Further support provided. Patient agreed at end of call to come in to office to speak with provider next week to discuss her feelings about discontinuing chemotherapy. Thanked for support, encouraged patient to call with any needs.

## 2017-08-16 NOTE — PROGRESS NOTES
Palliative Medicine Outpatient Services  [place]: [phone] ([phone]    Patient Name: Erica Taylor  YOB: 1955    Date of Current Visit: 08/16/17  Location of Current Visit:    [x] [facility] Office  [] [facility] Office  [] [facility] Office  [] Home  [] Other:      Date of Initial Visit: 5/12/17  Requesting Physician: Katie Briggs MD  Primary Care Physician: Angelica Aquino DO      SUMMARY:   Erica Taylor is a 60y.o. year old with a past history of pancreatic cancer, who was referred to Palliative Medicine by Dr. Kathleen Esquivel for symptom management and supportive care. She was diagnosed in 5/2017 after months of progressive abdominal pain, fatigue and weight loss. She's currently being treated with gemcitabine and abraxane. The patients social history includes: she's been  to [place] for 36 years. They have no children. She is a . She has a brother living in [place] and a sister in [place]. She has a personal relationship with God. She loves reading, biking, pool, documentaries, and history. Palliative Medicine is addressing the following current patient/family concerns: abdominal pain, nausea, constipation, poor appetite with early satiety, fatigue. PALLIATIVE DIAGNOSES:       ICD-10-CM ICD-9-CM    1. Abdominal pain, generalized R10.84 789.07    2. Drug-induced constipation K59.03 564.09      E980.5    3. Nausea without vomiting R11.0 787.02    4. Poor appetite R63.0 783.0    5. Depression, unspecified depression type F32.9 311    6. Malignant neoplasm of body of pancreas (HCC) C25.1 157.1           PLAN:     Patient Instructions     Dear Erica Taylor ,    It was a pleasure seeing you in the Palliative Medicine Office today. This is what we talked about:     1. Your abdominal pain  -Your pain is under good control  -Continue long-acting morphine 15-mg every 12 hours  and oxycodone 15-mg every 4 hours as needed for breakthrough pain.  You take about 4 of these a day  -Continue dexamethasone 2-mg daily in the morning after breakfast.    2. Your nausea  -This hasn't been a problem for you since you left the hospital  -Continue ondansetron 4-mg every 8 hours as needed  -I prescribed Phenergan 25-mg suppositories every 6 hours as needed as an additional anti-nausea medication    3. Your constipation  -You're moving your bowels regularly with 2 senna three times a day and Miralax or lactulose as needed  -You take pro-biotics twice a day    4. Your appetite  -Your appetite is good  -You had a lot of side effects from Creon     5. Your fatigue  -You've had less energy since you started chemotherapy  -You sleep several hours ar night, then wake up to eat a little something and to take a pain pill and may be up for awhile doing housework or the like  -You are OK with this sleep pattern    6. Your mood  -You've had a hard time with chemo with side effects that have affected your quality of life  -You met today with one of our clinical social workers, Lieutenant Mckeon, to talk through your feelings  -You plan on seeing her on an as needed basis  -It's important to you to stay positive and to live each day fully     7. Your cancer  -You've decided to stop chemo to concentrate on your quality of life    This is what you have shared with us about [address]. Planning 6/14/2017   Patient's Healthcare Decision Maker is: Named in scanned ACP document   Primary Decision Maker Name Mariaa Ramey   Primary Decision Maker Phone Number [phone]   Primary Decision Maker Relationship to Patient Spouse   Secondary Decision Maker Name -   Secondary Decision Maker Relationship to Patient -   Confirm Advance Directive Yes, on file   Does the patient have other document types Do Not Resuscitate       The Palliative Medicine Team is here to support you and your family. We will see you again in 4 weeks. Our Nurse Navigator Brayan Robb or Khoa Palmer will check in with you by phone before that time. If there are any concerns before that time, such as medication questions, worsening symptoms or a need to see a physician for an urgent or emergent situation; please call [phone] ([phone]). A physician is also on call after our normal business hours of 8am to 5pm.     In order to serve you better, please allow up to 48 hours for prescription refills to be processed. Certain medications may require more paperwork or a written prescription that you may need to  from the office. We appreciate you letting us know of any refill requests as soon as possible. We also would like you to sign up for PayLease as well. Sincerely,      Charmayne Rick, MD and the Palliative Medicine Team      Counseling and Coordination: see plan       GOALS OF CARE / TREATMENT PREFERENCES:   [====Goals of Care====]  GOALS OF CARE:  Patient / health care proxy stated goals: maintain quality of life      TREATMENT PREFERENCES:   Code Status:  [] Attempt Resuscitation       [x] Do Not Attempt Resuscitation    Advance Care Planning:  Advance Care Planning 6/14/2017   Patient's Healthcare Decision Maker is: Named in scanned ACP document   Primary Decision Maker Name Robyn Rowley   Primary Decision Maker Phone Number [phone]   Primary Decision Maker Relationship to Patient Spouse   Secondary Decision Maker Name -   Secondary Decision Maker Relationship to Patient -   Confirm Advance Directive Yes, on file   Does the patient have other document types Do Not Resuscitate       Other:  (If patient appropriate for POST, consider using PALLPOST smart phrase here)    The palliative care team has discussed with patient / health care proxy about goals of care / treatment preferences for patient.  [====Goals of Care====]         PRESCRIPTIONS GIVEN:     No orders of the defined types were placed in this encounter.           FOLLOW UP:     Future Appointments  Date Time Provider Ben Borja   8/31/2017 9:30 AM Jose Cid MD [facility] [address]   9/13/2017 11:30 AM Steven Valdez MD [address]   9/14/2017 10:15 AM SIMI Padilla [address]   9/28/2017 10:15 AM SIMI Padilla 6199   10/12/2017 10:15 AM SIMI Padilla 6199           PHYSICIANS INVOLVED IN CARE:   Patient Care Team:  Eulalio Muro DO as PCP - General (Family Practice)  Lucien Dang RN as Nurse Navigator (Oncology)  Hao Dean MD (Hematology and Oncology)  Steven Valdez MD as Physician (Palliative Medicine)       HISTORY:   Nursing documentation from date of visit reviewed. Reviewed patient-completed ESAS and advance care planning form. Reviewed patient record in prescription monitoring program.    CHIEF COMPLAINT: abdominal pain, nausea, constipation, poor appetite    HPI/SUBJECTIVE:    The patient is: [x] Verbal / [] Nonverbal     She's feeling very sad today. She's been crying a lot over the past few weeks. She felt terrible after her last cycle of chemo. She felt nauseated and very fatigued. Food din't taste good. She wasn't able to go on a beach trip she and her  had planned. She's just starting to feel better now. She's decided not to have any more chemo, it's having too much of an effect on her quality of life. She has no pain. She takes her long-acting morphine 3 times a day and an oxycodone tab about every 6 hours. She's no longer having the muscle spasms in her hands and legs. She still feels fatigued but not as much as after chemo. She sleeps for about 3 hours, then wakes up to eat a little bit and usually takes a pain pill. She then stays up for awhile, doing laundry or housework and goes back to sleep for another 3 to 4 hours. She gets up in the late morning and feels rested. Her appetite is better. She's no longer nauseated. She takes pro-biotics twice daily. She took Creon for awhile but it just made her feel sock so she stopped.       She's moving her bowels using 2 senna three times a day with Miralax or lactulose as needed. Sometimes her stools look funny. She has to watch what she eats as certain foods cause gas. She's feeling a little depressed today. Sometimes her  gets a little negative which gets in her nerves. She tries to stay positive and to live fully while she can. Clinical Pain Assessment (nonverbal scale for nonverbal patients):   [++++ Clinical Pain Assessment++++]  [++++Pain Severity++++]: Pain: 0  [++++Pain Character++++]: aching abdominal pain; hurts to wear a bra but its not skin pain  [++++Pain Duration++++]: months with increasing severity  [++++Pain Effect++++]: affects energy  [++++Pain Factors++++]: unable to name provoking factors; morphine and oxycodone help  [++++Pain Frequency++++]: constant  [++++Pain Location++++]: lower back across abdomen and underneath both breasts  [++++ Clinical Pain Assessment++++]       FUNCTIONAL ASSESSMENT:     Palliative Performance Scale (PPS):  PPS: 80       PSYCHOSOCIAL/SPIRITUAL SCREENING:     Any spiritual / Baptism concerns:  [x] Yes /  [] No    Caregiver Burnout:  [] Yes /  [] No /  [x] No Caregiver Present      Anticipatory grief assessment:   [x] Normal  / [] Maladaptive       ESAS Anxiety: Anxiety: 0    ESAS Depression: Depression: 5       REVIEW OF SYSTEMS:     The following systems were [x] reviewed / [] unable to be reviewed  Systems: constitutional, ears/nose/mouth/throat, respiratory, gastrointestinal, genitourinary, musculoskeletal, integumentary, neurologic, psychiatric, endocrine. Positive findings noted below.   Modified ESAS Completed by: provider   Fatigue: 3 Drowsiness: 0   Depression: 5 Pain: 0   Anxiety: 0 Nausea: 0   Anorexia: 0 Dyspnea: 0   Best Well-Bein Constipation: No   Other Problem (Comment): 0          PHYSICAL EXAM:     Wt Readings from Last 3 Encounters:   17 196 lb (88.9 kg)   17 193 lb (87.5 kg)   17 193 lb 12.8 oz (87.9 kg)     Blood pressure 131/66, pulse 67, temperature 96.2 °F (35.7 °C), temperature source Oral, resp. rate 16, height 5' 7\" (1.702 m), weight 196 lb (88.9 kg), last menstrual period 10/01/2005, SpO2 97 %.   Last bowel movement: See Nursing Note    Constitutional: sitting in chair, tearful, wearing wig  Eyes: pupils equal, anicteric  ENMT: no nasal discharge, moist mucous membranes  Cardiovascular: regular rhythm, distal pulses intact  Respiratory: breathing not labored, symmetric  Gastrointestinal: soft non-tender, +bowel sounds  Musculoskeletal: no deformity, no tenderness to palpation  Skin: warm, dry  Neurologic: following commands, moving all extremities  Psychiatric: full affect, no hallucinations  Other:       HISTORY:     Past Medical History:   Diagnosis Date    Allergic cough 10/5/2010    AR (allergic rhinitis)     cough    GERD (gastroesophageal reflux disease)     Tx FOR \"REALLY BAD HEARTBURN\" IN 2013    Nausea & vomiting     PMS (premenstrual syndrome) 12/26/14    IN PAST, WAS SEVERE; MENOPAUSE 10 YRS AGO, PT SAYS    Postmenopause, LMP 49yo, NO HRT 10/5/2010    S/P normal colonoscopy 2004 10/5/2010    Unspecified sleep apnea 12/26/14    USES CPAP      Past Surgical History:   Procedure Laterality Date    DEXA BONE DENSITY STUDY AXIAL  2/2004    Normal; Dr Rox Barthel (prev Gyn)    DILATION AND CURETTAGE  32yo    benign AUB; negative    ENDOSCOPY, COLON, DIAGNOSTIC  12/26/14    LAST COLONOSCOPY JAN 2014; PRIOR SCOPE 2004    HX RHINOPLASTY  1986    w/ chin implant (mentoplasty)    HX WISDOM TEETH EXTRACTION      AK OUTER EAR SURGERY PROC UNLISTED  1981    excision benign tumor behind right ear      Family History   Problem Relation Age of Onset    Stroke Mother     Heart Disease Mother     Heart Disease Father     Arthritis-rheumatoid Sister     Cancer Brother      skin cancer    HIV/AIDS Brother     Stroke Brother 79     TIA's    Heart Disease Brother 79     CABG    Stroke Maternal Grandmother     Heart Attack Maternal Grandfather     Heart Attack Paternal Grandmother     Heart Attack Paternal Grandfather     Colon Cancer Sister     Anesth Problems Neg Hx       History reviewed, no pertinent family history. Social History   Substance Use Topics    Smoking status: Former Smoker     Years: 6.00     Quit date: 9/27/1980    Smokeless tobacco: Never Used      Comment: used to smoke about 2 packs a week from college 1974 through [address] Alcohol use No      Comment: very very rare     No Known Allergies   Current Outpatient Prescriptions   Medication Sig    polyethylene glycol (MIRALAX) 17 gram/dose powder Take 17 g by mouth as needed.  multivitamin (ONE A DAY) tablet Take 1 Tab by mouth daily.  loratadine (CLARITIN) 10 mg tablet Take 10 mg by mouth.  CRANBERRY FRUIT CONCENTRATE (RE-AZO CRANBERRY PO) Take  by mouth.  oxyCODONE IR (OXY-IR) 15 mg immediate release tablet Take 1 Tab by mouth every four (4) hours as needed. Max Daily Amount: 90 mg.    morphine CR (MS CONTIN) 15 mg CR tablet Take 1 Tab by mouth three (3) times daily. Max Daily Amount: 45 mg.    dexamethasone (DECADRON) 2 mg tablet Take 0.5 Tabs by mouth daily (after breakfast). (Patient taking differently: Take 2 mg by mouth daily (after breakfast). )    prochlorperazine (COMPAZINE) 10 mg tablet Take 5 mg by mouth every six (6) hours as needed.  lactobacillus rhamnosus gg 10 billion cell (CULTURELLE) 10 billion cell capsule Take 2 Caps by mouth daily.  diazePAM (VALIUM) 5 mg tablet Take 1 Tab by mouth every eight (8) hours as needed (spasm). Max Daily Amount: 15 mg.    senna-docusate (SENNA PLUS) 8.6-50 mg per tablet Take 2 Tabs by mouth three (3) times daily.  bisacodyl (DULCOLAX) 10 mg suppository Insert 10 mg into rectum daily.  lactulose (CHRONULAC) 10 gram/15 mL solution Take 30 mL by mouth three (3) times daily. No current facility-administered medications for this visit.       Facility-Administered Medications Ordered in Other Visits   Medication Dose Route Frequency    [START ON 8/17/2017] gemcitabine (GEMZAR) 1,600 mg in 0.9% sodium chloride 250 mL, overfill volume 25 mL chemo infusion  1,600 mg IntraVENous ONCE    [START ON 8/17/2017] PACLitaxel-Protein Bound (ABRAXANE) 200 mg chemo infusion  200 mg IntraVENous ONCE    [START ON 8/17/2017] 0.9% sodium chloride infusion  25 mL/hr IntraVENous CONTINUOUS    [START ON 8/17/2017] dexamethasone (DECADRON) 4 mg/mL injection 8 mg  8 mg IntraVENous ONCE    [START ON 8/17/2017] prochlorperazine (COMPAZINE) with saline injection 10 mg  10 mg IntraVENous Q6H PRN    [START ON 8/17/2017] prochlorperazine (COMPAZINE) with saline injection 10 mg  10 mg IntraVENous ONCE          LAB DATA REVIEWED:     Lab Results   Component Value Date/Time    WBC 6.2 08/03/2017 09:45 AM    HGB 11.2 08/03/2017 09:45 AM    PLATELET 447 17/34/0357 09:45 AM     Lab Results   Component Value Date/Time    Sodium 134 08/03/2017 09:45 AM    Potassium 3.8 08/03/2017 09:45 AM    Chloride 100 08/03/2017 09:45 AM    CO2 27 08/03/2017 09:45 AM    BUN 9 08/03/2017 09:45 AM    Creatinine 0.57 08/03/2017 09:45 AM    Calcium 8.9 08/03/2017 09:45 AM    Magnesium 2.4 07/17/2017 02:42 PM      Lab Results   Component Value Date/Time    AST (SGOT) 23 08/03/2017 09:45 AM    Alk.  phosphatase 81 08/03/2017 09:45 AM    Protein, total 6.5 08/03/2017 09:45 AM    Albumin 3.6 08/03/2017 09:45 AM    Globulin 2.9 08/03/2017 09:45 AM     No results found for: INR, PTMR, PTP, PT1, PT2, APTT   No results found for: IRON, FE, TIBC, IBCT, PSAT, FERR        CONTROLLED SUBSTANCES SAFETY ASSESSMENT (IF ON CONTROLLED SUBSTANCES):     Reviewed opioid safety handout:  [x] Yes   [] No  24 hour opioid dose >150mg morphine equivalent/day:  [] Yes   [x] No  Benzodiazepines:  [] Yes   [x] No  Sleep apnea:  [x] Yes   [] No  Urine Toxicology Testing within last 6 months:  [] Yes   [x] No  History of or new aberrant medication taking behaviors:  [] Yes [] No            Total time:   Counseling / coordination time:   > 50% counseling / coordination?:

## 2017-08-16 NOTE — PROGRESS NOTES
Palliative Medicine Office Visit  Palliative Medicine Nurse Check In  [phone]    Patient Name: Lesa Perry  YOB: 1955      Date of Office Visit:  8/16/2017      Patient states:  She is here for a follow up appointment and review of her pain medication. From Check In Sheet (scanned in Media):  Has a medical provider talked with you about cardiopulmonary resuscitation (CPR)? [x] Yes   [] No   [] Unable to obtain    Nurse reminder to complete or update ACP FlowSheet:    Is ACP on the Problem List?    [x] Yes    [] No  IF ACP Document is ON FILE; Nurse to place ACP on Problem List     Is there an ACP Note in Chart Review/Note? [x] Yes    [] No   If NO: [address] 6/14/2017   Patient's Healthcare Decision Maker is: Named in scanned ACP document   Primary Decision Maker Name Rg Remy   Primary Decision Maker Phone Number [phone]   Primary Decision Maker Relationship to Patient Spouse   Secondary Decision Maker Name -   Secondary Decision Maker Relationship to Patient -   Confirm Advance Directive Yes, on file   Does the patient have other document types Do Not Resuscitate       Is there anything that we should know about you as a person in order to provide you the best care possible? She stated she no longer wants the Chemo treatment and would like to see the . She expressed she really needs to talk to someone cause she has increased anxiety. Have you been to the ER, urgent care clinic since your last visit? [] Yes   [x] No   [] Unable to obtain    Have you been hospitalized since your last visit? [] Yes   [x] No   [] Unable to obtain    Have you seen or consulted any other health care providers outside of the [address] [place] [address] since your last visit?    [] Yes   [x] No   [] Unable to obtain    Functional status (describe):          Last BM:  8/16/2017       accessed (date): 8/15/2017    Bottle review (for opioid pain medication):  Medication 1:  Morphine ER 15mg tab  Date filled: 8/6/2017  Directions: 1 tab by mouth 3x day  # filled:   90  # left:  78  # pills taking per day: 3  Last dose taken: 4:30AM    Medication 2:  Oxycodone 15mg tab  Date filled: 7/21/2017  Directions: 1 tab by mouth every 4hrs as needed  # filled: 180  # left:  96  # pills taking per day: 4  Last dose taken: 7:30AM    Medication 3:   Date filled:   Directions:   # filled:   # left:   # pills taking per day:  Last dose taken:    Medication 4:   Date filled:   Directions:   # filled:   # left:   # pills taking per day:  Last dose taken:

## 2017-08-17 NOTE — PROGRESS NOTES
Palliative Medicine Social Work Note    This LCSW met with pt on 17 when in [address] for scheduled appt with MD (Caleb Mane). Initial social work visit. Saw pt at request of MD for purpose of psychosocial support. Pt experiencing emotional distress, extreme sadness, and anticipatory grief. Presented as tearful soon followed by sobbing and weeping. Allowed pt time and space to express emotions. Provided empathic presence. Over period of time, pt recounted despair with diagnosis; isolation within [facility]; and significantly the lack of support of her  (Zacarias [place]) of 36 years. Described in detail their life together. She focused on their home, creating an environment that was beautiful and welcoming. Feels the house and gardens were her life's work. Describes self as an introvert. Spends days reading, watching movies, working around the house. Spouse is more socially engaged; passionate about blue grass music, attending many festivals throughout the year. She used to go with him, but no longer does so. Stated he has a very successful career in the field of nuclear energy. Job took them from their home state of [place] to [place], then to [place]. Describes him as a systematic planner. Through tears, andre Ulloa has already made my  arrangements\", including details such as where the  will be, the service itself, etc. [Pt's brother in [place] is a ; he will conduct the service.] As she described these details, she suddenly realized that much of her grief arises from the fact that her  has \"already moved on in his life---without me. \" With profound emotion, she said he told her he always hated their house, plans to sell it after she dies, will buy a motor home, and visit places they had talked about going. She used to accompany him to look at motor homes and to travel shows, but she no longer does that.  \"It was too painful, but the catalogues and brochures still come to the house. \" [facility] they are a constant reminder that he has moved on without me. Support: Very thin support system. No children. Is close to her brother in [place]. Has a sister in [place], who has committed to caring for her when she needs help. Lost a sister to cancer. No friends in [place]. Believes in God, but is not affiliated with a Quaker or formal Episcopalian. SW provided information on the [employer]. Offered to accompany pt at next PM visit. Also provided information on community resources for people with cancer. Pt said feels better. Appreciates being able to release emotions and begin to process feelings about her dx and her relationship with . Ambivalent about continuing counseling, but welcomes a follow up call for LCSW. Contact information provided. Plan:  1. Call pt in one week to f/u with this visit. 2. Reach out to [address] [place] [address] to collaborate and maximize support for pt. 3. Provide ongoing support and assistance. Jose Aragon., LCSW    Work: [phone] ([phone])  Cell:   [phone]

## 2017-08-24 NOTE — PROGRESS NOTES
Telephone Call    Purpose of Call: Follow up to Palliative Medicine Visit, 8/17/17    This LCSW called pt to check in following supportive counseling session during pt's PM visit on 8/17/17. Left voice mail message on pt's cell phone ([phone]). SW to f/u by end of week. Jaden Cat, LCSW  [phone]

## 2017-08-27 PROBLEM — Z71.89 ACP (ADVANCE CARE PLANNING): Status: ACTIVE | Noted: 2017-01-01

## 2017-08-30 NOTE — PROGRESS NOTES
Telephone Call    Purpose of Call: Follow up to Palliative Visit (8/17/17)    This LCSW called pt as a follow up to supportive counseling session during last Palliative visit. (See SW note, 8/17/17.) Pt stated is doing better. Appreciates opportunity to release emotions that had been building up for a long time. As a result, she said she is making an effort to get out more. Went out to dinner with her ; they have made plans for the upcoming Labor Day weekend. Does not feel need to meet further with LCSW at this time. Understands she can call to talk and/or schedule an appt any time in the future. Pt RTC on 9/13/17. SW to check in at this time. Bernard Munoz., [facility]  [phone]

## 2017-08-31 NOTE — PROGRESS NOTES
Hematology/Oncology follow up    REASON FOR VISIT: Stage III pancreatic adenocarcinoma - unresectable. Palliative Gemcitabine+ Abraxane - Patient declined this after 2 cycles    HISTORY OF PRESENT ILLNESS: Ms. Cam Calderon is a 60 y.o. female with unresectable pancreatic cancer. She decided to quit chemotherapy after C2 D1. She did this because of side effects, because of infusion center wait time and this left her with a lot of worry. She has had on and off nausea, fatigue. Not having emesis. Has had some epigastric pain that goes to her back, she is taking oxycodone every 4 hours. Appetite has been somewhat decreased. She is taking 1 tab (2 mg) of Dexamethasone on the morning,   She has no CP, SOB, HA, fall, bleeding. Past Sunday she noted a sudden R hip \" pop\" , this was sore for a day and had a bruise for a few days. This has now resolved. ECOG PS is 1. Emotional well being addressed. Patient reiterates that she prefers quality over quantity. Oncologic history  She presented to the ED on 5/8/17 with severe abdominal pain. She had it for 4-5 months in the lower abdomen. Pain radiated to the back/shoulder for which she had been trying to go to Physical Therapy to manage. This provided little relief for her. Medications over the counter had also provided little relief. Symptoms associated with fatigue and  unintentional weight loss of approximately 20 pounds since December 2017    CT abdomen/pelvis completed in the ED revealed a 2.5cm pancreatic body lesion that involved the splenic artery and celiac artery. Dr. Yosvany Whittington with GI performed an EUS 5/9/17 with pathology showing adenocarcinoma. LN biopsy was non diagnostic. Seen by Dr. Girish Latif with Surgery and considered unresectable at the time. CT chest 5/11/17 showed a 4 mm lung nodule in the left upper lobe that was non specific.   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, PT SAYS    Postmenopause, LMP 49yo, NO HRT 10/5/2010    S/P normal colonoscopy 2004 10/5/2010    Unspecified sleep apnea 12/26/14    USES CPAP       Past Surgical History:   Procedure Laterality Date    DEXA BONE DENSITY STUDY AXIAL  2/2004    Normal; Dr Daley Monday (prev Gyn)   [address]  99XB    benign AUB; negative    ENDOSCOPY, COLON, DIAGNOSTIC  12/26/14    LAST COLONOSCOPY JAN 2014; PRIOR SCOPE 2004    HX RHINOPLASTY  1986    w/ chin implant (mentoplasty)    HX WISDOM TEETH EXTRACTION      CO OUTER EAR SURGERY PROC UNLISTED  1981    excision benign tumor behind right ear       No Known Allergies    Current Outpatient Prescriptions   Medication Sig Dispense Refill    polyethylene glycol (MIRALAX) 17 gram/dose powder Take 17 g by mouth as needed.  lactobacillus rhamnosus gg 10 billion cell (CULTURELLE) 10 billion cell capsule Take 2 Caps by mouth daily.  diazePAM (VALIUM) 5 mg tablet Take 1 Tab by mouth every eight (8) hours as needed (spasm). Max Daily Amount: 15 mg. 30 Tab 0    multivitamin (ONE A DAY) tablet Take 1 Tab by mouth daily.  loratadine (CLARITIN) 10 mg tablet Take 10 mg by mouth.  CRANBERRY FRUIT CONCENTRATE (RE-AZO CRANBERRY PO) Take  by mouth.  oxyCODONE IR (OXY-IR) 15 mg immediate release tablet Take 1 Tab by mouth every four (4) hours as needed. Max Daily Amount: 90 mg. 180 Tab 0    morphine CR (MS CONTIN) 15 mg CR tablet Take 1 Tab by mouth three (3) times daily. Max Daily Amount: 45 mg. 90 Tab 0    dexamethasone (DECADRON) 2 mg tablet Take 0.5 Tabs by mouth daily (after breakfast).  (Patient taking differently: Take 2 mg by mouth daily (after breakfast). ) 30 Tab 1    prochlorperazine (COMPAZINE) 10 mg tablet Take 5 mg by mouth every six (6) hours as needed.  senna-docusate (SENNA PLUS) 8.6-50 mg per tablet Take 2 Tabs by mouth three (3) times daily.  lactulose (CHRONULAC) 10 gram/15 mL solution Take 30 mL by mouth three (3) times daily. 480 mL 1    bisacodyl (DULCOLAX) 10 mg suppository Insert 10 mg into rectum daily. 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 [address] Alcohol use No      Comment: very very rare    Drug use: No    Sexual activity: Not Currently     Other Topics Concern    Occupational Exposure No     down to 1 cup of cooffee qam; was up to 3-4 cups up unitl ~ the past few weeks    Weight Concern Yes     happy she has lost 20 lbs since strict diet and exercise    Special Diet Yes     since beginning of August went on strict diet of carb cuonting, low fat, protein and calorie counts; well balanced    Exercise Yes     swims a lot and since August started walking about 2-5 x a week x 45 minutes     Social History Narrative       Family History   Problem Relation Age of Onset    Stroke Mother     Heart Disease Mother     Heart Disease Father     Arthritis-rheumatoid Sister     Cancer Brother      skin cancer    HIV/AIDS Brother     Stroke Brother 79     TIA's    Heart Disease Brother 79     CABG    Stroke Maternal Grandmother     Heart Attack Maternal Grandfather     Heart Attack Paternal Grandmother     Heart Attack Paternal Grandfather     Colon Cancer Sister  Anesth Problems Neg Hx        ROS  As in the HPI, all others negative      Physical Examination:   Visit Vitals    /77    Pulse 67    Temp 97.9 °F (36.6 °C)    Resp 16    Ht 5' 7\" (1.702 m)    Wt 197 lb 9.6 oz (89.6 kg)    LMP 10/01/2005 (Approximate)  Comment: 47years old    SpO2 98%    BMI 30.95 kg/m2     General appearance - alert, pleasant  Mental status - oriented to person, place, and time  Mouth - mucous membranes moist, pharynx normal without lesions  Neck - supple, no significant adenopathy  Chest - clear to auscultation, no wheezes, rales or rhonchi, symmetric air entry  Heart - normal rate, regular rhythm, normal S1, S2, no murmurs, rubs, clicks or gallops  Abdomen - soft, non-tender to light palpation, +bowel sounds  Extremities - peripheral pulses normal, no pedal edema      LABS  Lab Results   Component Value Date/Time    WBC 6.2 08/03/2017 09:45 AM    HGB 11.2 08/03/2017 09:45 AM    HCT 34.0 08/03/2017 09:45 AM    PLATELET 039 62/66/8156 09:45 AM    MCV 91.6 08/03/2017 09:45 AM    ABS. NEUTROPHILS 3.6 08/03/2017 09:45 AM     Lab Results   Component Value Date/Time    Sodium 134 08/03/2017 09:45 AM    Potassium 3.8 08/03/2017 09:45 AM    Chloride 100 08/03/2017 09:45 AM    CO2 27 08/03/2017 09:45 AM    Glucose 119 08/03/2017 09:45 AM    BUN 9 08/03/2017 09:45 AM    Creatinine 0.57 08/03/2017 09:45 AM    GFR est AA >60 08/03/2017 09:45 AM    GFR est non-AA >60 08/03/2017 09:45 AM    Calcium 8.9 08/03/2017 09:45 AM     Lab Results   Component Value Date/Time    AST (SGOT) 23 08/03/2017 09:45 AM    Alk.  phosphatase 81 08/03/2017 09:45 AM    Protein, total 6.5 08/03/2017 09:45 AM    Albumin 3.6 08/03/2017 09:45 AM    Globulin 2.9 08/03/2017 09:45 AM    A-G Ratio 1.2 08/03/2017 09:45 AM     6/20/17: CA 19-9--- 286    IMAGING    CT CAP 6/23/17  IMPRESSION: Low-attenuation mass in the body of the pancreas with obstruction of  the main pancreatic duct and atrophy of the tail of the pancreas. There is  vascular encasement of the celiac axis and splenic and hepatic arteries with  invasion and narrowing of the hepatic artery. There are lymph nodes in the  gastrohepatic ligament. The appearance is essentially unchanged in comparison  with the examination 5 weeks ago. ASSESSMENT  Ms. Jennie Pennington is a 60 y.o. female with  pancreatic adenocarcinoma on CT imaging 5/8/17, T3N0 (celiac involvement) and currently unresectable as per Tumor board recommendations. Questionable 4 mm left upper lobe nodule and no clear evidence of metastasis. She presented with pancreatitis and abdominal pain, s/p Celiac block. She declined chemotherapy initially but returned 5-6 weeks after diagnosis for reconsideration. CTCAP 6/20/17 stable    Started Gemcitabine and abraxane 7/6/17, but has decided tos top after cycle 2 da 1    DISCUSSION/PLAN  1. Pancreatic adenocarcinoma Stage III unresectable. Stop chemotherapy. I reviewed that getting a scan at this point may not help as she neither wants to have surgery (in case of a response) nor would switch to a different palliative chemotherapy regimen if she has progressed  We also discussed palliative Xeloda alone so she is not having to spend time in the infusion center- she declined this as well. Flush port today, schedule for removal    2. Pain: Back pain from pancreatic primary, s/p celiac plx block. Doing very well. ay. Denies pain today.      Follow up with Dr. Ulysses Kugel    RCT prn    Signed by: Andrea Kohler MD                     August 31, 2017

## 2017-08-31 NOTE — PROGRESS NOTES
Palliative Medicine  Nursing Note  [phone]  Fax [phone]      Patient Name: Sanaz Lockwood  YOB: [birth year]      Advance Care Planning 6/14/2017   Patient's Healthcare Decision Maker is: Named in scanned ACP document   Primary Decision Maker Name Paresh Lopez   Primary Decision Maker Phone Number [phone]   Primary Decision Maker Relationship to Patient Spouse   Secondary Decision Maker Name -   Secondary Decision Maker Relationship to Patient -   Confirm Advance Directive Yes, on file   Does the patient have other document types Do Not Resuscitate     Called placed to Ms. Bennett to discuss medication request from her [address] email which is below,  left message for her to return call. \"Message from Sanaz Lockwood to Jaimie Nieves MD sent at 8/30/2017  5:35 AM -----   Just submitted request to refill 3 prescriptions that will run out before I see Dr. Mary Lou Tanner on Sept. 13th.  Currently, I have  18 pills left on Dexamethasone, 41 pills left of the Oxycodone, and 39 pills left of morphine.  I am taking about 5 Oxydocone pills per day and 3 Morphine per day.  I may need to increase Dexamethasone to two pills a day as my appetite is poor lately.  I will call office today to discuss the increase in Dexamethasone. \"    8/31/17- Spoke with Dr. Kali Hearn who will have Mrs. Nato Jacobo prescriptions available at [facility] on Friday 9/1/17. She is ok with increasing the dexamethasone from 2mg to 4 mg daily. This will be called into the pharmacy. Call placed to Mrs. Bennett. Left message regarding prescription  and refill for Dexamethasone at pharmacy. Advised to call for any questions or concerns. Call placed to pharmacy and ordered prescription for Dexamethasone 2mg- take 2 tabs (4mg total) daily #60 with 1 refill.       GAL TamN, RN, [facility]  Palliative Medicine

## 2017-08-31 NOTE — TELEPHONE ENCOUNTER
Patient walked into [facility] office this morning advising she forgot that Dr Nava Stanton was at [facility] today, informs she can wait until tomorrow to  her prescription at [facility] when Dr Nava Stanton returns . She was in the area for another appointment and was hoping she could . Patient advises she will await return call for rx when ready for .

## 2017-08-31 NOTE — TELEPHONE ENCOUNTER
Triage for Controlled Substance Refill Request    Pain Diagnosis: _Malignant neoplasm of body of pancreas [facility])    Last Outpatient Visit: _8/16/2017     Next Outpatient Visit: _9/13/2017     Reason for refill needed outside of office visit? Will run out of medication before f/u appointment.     -Pain escalation requiring increased medication- no   -Appointment not scheduled prior to need for scheduled refill- no   -Appointment scheduled but missed or moved prior to need for scheduled refill- no     Pharmacy: _[facility]PH[facility]ACY [id number] [address] AT [facility]    Medication:oxyCODONE IR (OXY-IR) 15 mg  Dose and directions: Take 1 Tab by mouth every four (4) hours as needed. Number dispensed:180  Date filled ( or Pharmacy): 7/21/2017   # left: 36(takes 5 a day)    Medication:morphine CR (MS CONTIN) 15 mg  Dose and directions: Take 1 Tab by mouth three (3) times daily. Number dispensed:90   Date filled ( or Pharmacy): 8/6/2017   #left: 36( takes three daily)    Medications: dexamethasone (DECADRON) 2 mg  Dose and Directions: Patient taking differently: Take 2 mg by mouth daily (after breakfast).   Number Dispensed: 30  Date Filled:7/19/2017   #left:16 (patient wants to take up to 2 a day)       reviewed: _yes     Date of Urine Drug Screen:  _n/a     Opioid Safety Handout given:  _yes     Appropriate for refill:  _yes     Action:  _

## 2017-08-31 NOTE — TELEPHONE ENCOUNTER
From: Andrea Colon  To: Heather Ritter MD  Sent: 8/30/2017 5:30 AM EDT  Subject: Medication Renewal Request    Original authorizing provider: Heather Ritter MD    Amanda Bennett would like a refill of the following medications:  oxyCODONE IR (OXY-IR) 15 mg immediate release tablet [Irena Messer MD]  morphine CR (MS CONTIN) 15 mg CR tablet [Irena Messer MD]  dexamethasone (DECADRON) 2 mg tablet Heather Ritter MD]    Preferred pharmacy: [facility]/PHARMACY #[id number] - [place], [place] - [facility] [address]/ Andrea Singh [facility]    Comment:

## 2017-08-31 NOTE — PROGRESS NOTES
Outpatient Infusion Center Note:  Arrived - 1025    Visit Vitals    /76    Pulse 68    Temp 98 °F (36.7 °C)    Resp 18    LMP 10/01/2005 (Approximate)  Comment: 47years old    SpO2 99%       Assessment - unchanged. Port accessed & flushed per protocol w/o difficulty. Reyes needle removed. 1035 - Tolerated well. Pt denies any acute problems/changes. Discharged from [facility] ambulatory.

## 2017-08-31 NOTE — TELEPHONE ENCOUNTER
Patient will be by to  prescriptions today.  Wanted to make sure all 3 are ready for  requesting call back to confirm

## 2017-09-11 NOTE — TELEPHONE ENCOUNTER
Called patient to advise/confirm upcoming appt with Dr. Dashawn Klein on  9/13/17    at 11:30am at [facility] office. No answer so left voicemail. Also advised to please bring in your Drivers License and Insurance Card with you to appointment as well as any prescription pain medication in the original container with you to appt.

## 2017-09-13 NOTE — PROGRESS NOTES
Palliative Medicine Outpatient Services  [place]: [phone] ([phone])    Patient Name: Flora Toth  YOB: 1955    Date of Current Visit: 09/13/17  Location of Current Visit:    [x] [facility] Office  [] [facility] Office  [] [id number] [place] Replaced by [facility] Office  [] Home  [] Other:      Date of Initial Visit: 5/12/17  Requesting Physician: Nataly Gómez MD  Primary Care Physician: Argelia Blanchard DO      SUMMARY:   Flora Toth is a 60y.o. year old with a past history of pancreatic cancer, who was referred to Palliative Medicine by Dr. Faisal Pepper for symptom management and supportive care. She was diagnosed in 5/2017 after months of progressive abdominal pain, fatigue and weight loss. She's currently being treated with gemcitabine and abraxane. The patients social history includes: she's been  to Ivonne Bojorquez for 36 years. They have no children. She is a . She has a brother living in [place] and a sister in [place]. She has a personal relationship with God. She loves reading, biking, pool, documentaries, and history. Palliative Medicine is addressing the following current patient/family concerns: abdominal pain, nausea, constipation, poor appetite with early satiety, fatigue. PALLIATIVE DIAGNOSES:       ICD-10-CM ICD-9-CM    1. Abdominal pain, generalized R10.84 789.07    2. Drug-induced constipation K59.03 564.09      E980.5    3. Nausea without vomiting R11.0 787.02    4. Poor appetite R63.0 783.0    5. Depression, unspecified depression type F32.9 311    6. Neoplastic malignant related fatigue R53.0 780.79    7. Malignant neoplasm of body of pancreas (HCC) C25.1 157.1           PLAN:     Patient Instructions     Dear Flora Toth ,    It was a pleasure seeing you in the Palliative Medicine Office today. This is what we talked about:     1. Your abdominal pain  -You've been having more pain  -I'm increasing long-acting morphine to 30-mg every 8 hours.  You can take 2 of the morphine 15-mg every 8 hours until you finish up the pills you have    -Continue oxycodone 15-mg: take this every 3 to 4 hours as needed  -Continue dexamethasone 2-mg daily in the morning twice daily  -Start Prevacid 15-mg daily to protect your stomach lining from irritation due to the dexamethasone    2. Your nausea  -This hasn't been a problem for you since you left the hospital  -Continue ondansetron 4-mg every 8 hours as needed  -I prescribed Phenergan 25-mg suppositories every 6 hours as needed as an additional anti-nausea medication    3. Your constipation  -You're moving your bowels regularly with 1 senna three times a day and Miralax or an enema as needed  -You take pro-biotics twice a day    4. Your appetite  -Your appetite is improved after increasing the dose of dexamethasone  -You had a lot of side effects from Creon     5. Your fatigue  -You continue to feel fatigued but this is unchanged since we last saw you in clinic    6. Your mood  -You are feeling better today than you felt when you were here last month  -You met again today with one of our clinical social workers, Vidal Casanova  -You plan on seeing her on an as needed basis  -It's important to you to stay positive and to live each day fully     7. Your cancer  -You've decided to stop chemo to concentrate on your quality of life    This is what you have shared with us about [address]. Planning 6/14/2017   Patient's Healthcare Decision Maker is: Named in scanned ACP document   Primary Decision Maker Name Michelle James   Primary Decision Maker Phone Number [phone]   Primary Decision Maker Relationship to Patient Spouse   Secondary Decision Maker Name -   Secondary Decision Maker Relationship to Patient -   Confirm Advance Directive Yes, on file   Does the patient have other document types Do Not Resuscitate       The Palliative Medicine Team is here to support you and your family. We will see you again in 4 weeks.     Our Nurse Navigator Harley Marin will check in with you by phone before that time. If there are any concerns before that time, such as medication questions, worsening symptoms or a need to see a physician for an urgent or emergent situation; please call [phone] ([facility]). A physician is also on call after our normal business hours of 8am to 5pm.     In order to serve you better, please allow up to 48 hours for prescription refills to be processed. Certain medications may require more paperwork or a written prescription that you may need to  from the office. We appreciate you letting us know of any refill requests as soon as possible. We also would like you to sign up for [employer] as well.     Sincerely,      Katy Aguirre MD and the Palliative Medicine Team      Counseling and Coordination: see plan       GOALS OF CARE / TREATMENT PREFERENCES:   [====Goals of Care====]  GOALS OF CARE:  Patient / health care proxy stated goals: maintain quality of life      TREATMENT PREFERENCES:   Code Status:  [] Attempt Resuscitation       [x] Do Not Attempt Resuscitation    Advance Care Planning:  Advance Care Planning 6/14/2017   Patient's Healthcare Decision Maker is: Named in scanned ACP document   Primary Decision Maker Name Supa Pettit   Primary Decision Maker Phone Number [phone]   Primary Decision Maker Relationship to Patient Spouse   Secondary Decision Maker Name -   Secondary Decision Maker Relationship to Patient -   Confirm Advance Directive Yes, on file   Does the patient have other document types Do Not Resuscitate       Other:  (If patient appropriate for POST, consider using PALLPOST smart phrase here)    The palliative care team has discussed with patient / health care proxy about goals of care / treatment preferences for patient.  [====Goals of Care====]         PRESCRIPTIONS GIVEN:     Medications Ordered Today   Medications    oxyCODONE IR (OXY-IR) 15 mg immediate release tablet     Sig: Take 1 tab every 3 to 4 hours as needed for pain     Dispense:  180 Tab     Refill:  0     Patient with pancreatic cancer  C25.9    morphine CR (MS CONTIN) 30 mg CR tablet     Sig: Take 1 Tab by mouth every eight (8) hours. Max Daily Amount: 90 mg. Dispense:  90 Tab     Refill:  0    diazePAM (VALIUM) 5 mg tablet     Sig: Take 1 Tab by mouth every eight (8) hours as needed (spasm). Max Daily Amount: 15 mg. Dispense:  30 Tab     Refill:  0    lansoprazole (PREVACID 24HR) 15 mg capsule     Sig: Take 1 Cap by mouth daily. Dispense:  30 Cap     Refill:  3           FOLLOW UP:     Future Appointments  Date Time Provider Ben Borja   9/20/2017 9:00 AM [facility] ANGIO 1 [facility]           PHYSICIANS INVOLVED IN CARE:   Patient Care Team:  Eulalio Muro DO as PCP - General (Family Practice)  Lucien Dang RN as Nurse Navigator (Oncology)  Hao Dean MD (Hematology and Oncology)  Steven Valdez MD as Physician (Palliative Medicine)       HISTORY:   Nursing documentation from date of visit reviewed. Reviewed patient-completed ESAS and advance care planning form. Reviewed patient record in prescription monitoring program.    CHIEF COMPLAINT: abdominal pain, nausea, constipation, poor appetite    HPI/SUBJECTIVE:    The patient is: [x] Verbal / [] Nonverbal     She's been having more pain. She's been taking 6 oxycodone a day. It helps with the pain but wears off after 2 to 3 hours. She sleeps maybe 6 hours a night, then wakes up in more pain. She's increased her dexamethasone to twice a day and this helps a lot with her appetite. She's had no nausea. She continues to take pro-biotics twice daily. She took Creon for awhile but it just made her feel sock so she stopped. She had bad heartburn last night which she thinks was from eating a hamburger casserole. She took Tums and Pepto-Bismol and they helped. She used to have bad heartburn but not in years.     She's moving her bowels regularly with senna and occasional use of Miralax or an enema. She's feeling better today. Things are better at home. Clinical Pain Assessment (nonverbal scale for nonverbal patients):   [++++ Clinical Pain Assessment++++]  [++++Pain Severity++++]: Pain: 2  [++++Pain Character++++]: aching abdominal pain; hurts to wear a bra but its not skin pain  [++++Pain Duration++++]: months with increasing severity  [++++Pain Effect++++]: affects energy  [++++Pain Factors++++]: unable to name provoking factors; morphine and oxycodone help  [++++Pain Frequency++++]: constant  [++++Pain Location++++]: lower back across abdomen and underneath both breasts  [++++ Clinical Pain Assessment++++]       FUNCTIONAL ASSESSMENT:     Palliative Performance Scale (PPS):  PPS: 80       PSYCHOSOCIAL/SPIRITUAL SCREENING:     Any spiritual / Hinduism concerns:  [x] Yes /  [] No    Caregiver Burnout:  [] Yes /  [] No /  [x] No Caregiver Present      Anticipatory grief assessment:   [x] Normal  / [] Maladaptive       ESAS Anxiety: Anxiety: 0    ESAS Depression: Depression: 0       REVIEW OF SYSTEMS:     The following systems were [x] reviewed / [] unable to be reviewed  Systems: constitutional, ears/nose/mouth/throat, respiratory, gastrointestinal, genitourinary, musculoskeletal, integumentary, neurologic, psychiatric, endocrine. Positive findings noted below. Modified ESAS Completed by: provider   Fatigue: 6 Drowsiness: 0   Depression: 0 Pain: 2   Anxiety: 0 Nausea: 0   Anorexia: 0 Dyspnea: 0   Best Well-Being: 3 Constipation: No   Other Problem (Comment): 5          PHYSICAL EXAM:     Wt Readings from Last 3 Encounters:   09/13/17 197 lb 4.8 oz (89.5 kg)   08/31/17 197 lb 9.6 oz (89.6 kg)   08/16/17 196 lb (88.9 kg)     Blood pressure 143/84, pulse 60, temperature 97.4 °F (36.3 °C), temperature source Oral, resp. rate 16, height 5' 7\" (1.702 m), weight 197 lb 4.8 oz (89.5 kg), last menstrual period 10/01/2005, SpO2 98 %.   Last bowel movement: See Nursing Note    Constitutional: sitting in chair, tearful, wearing wig  Eyes: pupils equal, anicteric  ENMT: no nasal discharge, moist mucous membranes  Cardiovascular: regular rhythm, distal pulses intact  Respiratory: breathing not labored, symmetric  Gastrointestinal: soft non-tender, +bowel sounds  Musculoskeletal: no deformity, no tenderness to palpation, trace bilateral lower extremity edema  Skin: warm, dry  Neurologic: following commands, moving all extremities  Psychiatric: full affect, no hallucinations  Other:       HISTORY:     Past Medical History:   Diagnosis Date    Allergic cough 10/5/2010    AR (allergic rhinitis)     cough    GERD (gastroesophageal reflux disease)     Tx FOR \"REALLY BAD HEARTBURN\" IN 2013    Nausea & vomiting     PMS (premenstrual syndrome) 12/26/14    IN PAST, WAS SEVERE; MENOPAUSE 10 YRS AGO, PT SAYS    Postmenopause, LMP 51yo, NO HRT 10/5/2010    S/P normal colonoscopy 2004 10/5/2010    Unspecified sleep apnea 12/26/14    USES CPAP      Past Surgical History:   Procedure Laterality Date    DEXA BONE DENSITY STUDY AXIAL  2/2004    Normal; Dr Margret Jack (prev Gyn)    DILATION AND CURETTAGE  32yo    benign AUB; negative    ENDOSCOPY, COLON, DIAGNOSTIC  12/26/14    LAST COLONOSCOPY JAN 2014; PRIOR SCOPE 2004    HX RHINOPLASTY  1986    w/ chin implant (mentoplasty)    HX WISDOM TEETH EXTRACTION      SD OUTER EAR SURGERY PROC UNLISTED  1981    excision benign tumor behind right ear      Family History   Problem Relation Age of Onset    Stroke Mother     Heart Disease Mother     Heart Disease Father     Arthritis-rheumatoid Sister     Cancer Brother      skin cancer    HIV/AIDS Brother     Stroke Brother 79     TIA's    Heart Disease Brother 79     CABG    Stroke Maternal Grandmother     Heart Attack Maternal Grandfather     Heart Attack Paternal Grandmother     Heart Attack Paternal Grandfather     Colon Cancer Sister     Anesth Problems Neg Hx       History reviewed, no pertinent family history. Social History   Substance Use Topics    Smoking status: Former Smoker     Years: 6.00     Quit date: 9/27/1980    Smokeless tobacco: Never Used      Comment: used to smoke about 2 packs a week from college 1974 through . [address] Alcohol use No      Comment: very very rare     No Known Allergies   Current Outpatient Prescriptions   Medication Sig    oxyCODONE IR (OXY-IR) 15 mg immediate release tablet Take 1 Tab by mouth every four (4) hours as needed. Max Daily Amount: 90 mg.    morphine CR (MS CONTIN) 15 mg CR tablet Take 1 Tab by mouth two (2) times a day. Max Daily Amount: 30 mg.    dexamethasone (DECADRON) 2 mg tablet 2 tabs by mouth daily.  polyethylene glycol (MIRALAX) 17 gram/dose powder Take 17 g by mouth as needed.  lactobacillus rhamnosus gg 10 billion cell (CULTURELLE) 10 billion cell capsule Take 2 Caps by mouth daily.  diazePAM (VALIUM) 5 mg tablet Take 1 Tab by mouth every eight (8) hours as needed (spasm). Max Daily Amount: 15 mg.    multivitamin (ONE A DAY) tablet Take 1 Tab by mouth daily.  loratadine (CLARITIN) 10 mg tablet Take 10 mg by mouth.  CRANBERRY FRUIT CONCENTRATE (RE-AZO CRANBERRY PO) Take  by mouth.  prochlorperazine (COMPAZINE) 10 mg tablet Take 5 mg by mouth every six (6) hours as needed.  senna-docusate (SENNA PLUS) 8.6-50 mg per tablet Take 2 Tabs by mouth three (3) times daily.  bisacodyl (DULCOLAX) 10 mg suppository Insert 10 mg into rectum daily.  lactulose (CHRONULAC) 10 gram/15 mL solution Take 30 mL by mouth three (3) times daily. No current facility-administered medications for this visit.            LAB DATA REVIEWED:     Lab Results   Component Value Date/Time    WBC 6.2 08/03/2017 09:45 AM    HGB 11.2 08/03/2017 09:45 AM    PLATELET 532 58/37/2246 09:45 AM     Lab Results   Component Value Date/Time    Sodium 134 08/03/2017 09:45 AM    Potassium 3.8 08/03/2017 09:45 AM    Chloride 100 08/03/2017 09:45 AM    CO2 27 08/03/2017 09:45 AM    BUN 9 08/03/2017 09:45 AM    Creatinine 0.57 08/03/2017 09:45 AM    Calcium 8.9 08/03/2017 09:45 AM    Magnesium 2.4 07/17/2017 02:42 PM      Lab Results   Component Value Date/Time    AST (SGOT) 23 08/03/2017 09:45 AM    Alk.  phosphatase 81 08/03/2017 09:45 AM    Protein, total 6.5 08/03/2017 09:45 AM    Albumin 3.6 08/03/2017 09:45 AM    Globulin 2.9 08/03/2017 09:45 AM     No results found for: INR, PTMR, PTP, PT1, PT2, APTT   No results found for: IRON, FE, TIBC, IBCT, PSAT, FERR        CONTROLLED SUBSTANCES SAFETY ASSESSMENT (IF ON CONTROLLED SUBSTANCES):     Reviewed opioid safety handout:  [x] Yes   [] No  24 hour opioid dose >150mg morphine equivalent/day:  [] Yes   [x] No  Benzodiazepines:  [] Yes   [x] No  Sleep apnea:  [x] Yes   [] No  Urine Toxicology Testing within last 6 months:  [] Yes   [x] No  History of or new aberrant medication taking behaviors:  [] Yes   [] No            Total time:   Counseling / coordination time:   > 50% counseling / coordination?:

## 2017-09-13 NOTE — MR AVS SNAPSHOT
Visit Information Date & Time Provider Department Dept. Phone Encounter #  
 9/13/2017 11:30 AM Zelalem Johnston MD [place] [id number] Upcoming Health Maintenance Date Due Hepatitis C Screening 1955 Pneumococcal 19-64 Highest Risk (1 of 3 - PCV13) 1/20/1973 FOBT Q 1 YEAR AGE 50-75 1/20/2004 PAP AKA CERVICAL CYTOLOGY 10/12/2013 ZOSTER VACCINE AGE 60> 11/20/2013 INFLUENZA AGE 9 TO ADULT 8/1/2017 BREAST CANCER SCRN MAMMOGRAM 12/15/2018 DTaP/Tdap/Td series (2 - Td) 10/5/2020 Allergies as of 9/13/2017  Review Complete On: 9/13/2017 By: Ana Cooper LPN No Known Allergies Current Immunizations  Reviewed on 8/3/2017 Name Date TDAP Vaccine 10/5/2010 Not reviewed this visit Vitals BP Pulse Temp Resp Height(growth percentile) Weight(growth percentile) 143/84 (BP 1 Location: Left arm, BP Patient Position: Sitting) 60 97.4 °F (36.3 °C) (Oral) 16 5' 7\" (1.702 m) 197 lb 4.8 oz (89.5 kg) LMP SpO2 BMI OB Status Smoking Status 10/01/2005 (Approximate) 98% 30.9 kg/m2 Postmenopausal Former Smoker BMI and BSA Data Body Mass Index Body Surface Area 30.9 kg/m 2 2.06 m 2 Preferred Pharmacy Pharmacy Name Phone [id number] [address] [phone] Your Updated Medication List  
  
   
This list is accurate as of: 9/13/17 12:40 PM.  Always use your most recent med list.  
  
  
  
  
 bisacodyl 10 mg suppository Commonly known as:  DULCOLAX Insert 10 mg into rectum daily. CLARITIN 10 mg tablet Generic drug:  loratadine Take 10 mg by mouth. CULTURELLE 10 billion cell capsule Generic drug:  lactobacillus rhamnosus gg 10 billion cell Take 2 Caps by mouth daily. dexamethasone 2 mg tablet Commonly known as:  DECADRON  
2 tabs by mouth daily. diazePAM 5 mg tablet Commonly known as:  VALIUM Take 1 Tab by mouth every eight (8) hours as needed (spasm). Max Daily Amount: 15 mg.  
  
 lactulose 10 gram/15 mL solution Commonly known as:  Richrd Shadow Take 30 mL by mouth three (3) times daily. lansoprazole 15 mg capsule Commonly known as:  PREVACID 24HR Take 1 Cap by mouth daily. MIRALAX 17 gram/dose powder Generic drug:  polyethylene glycol Take 17 g by mouth as needed. morphine CR 30 mg CR tablet Commonly known as:  MS CONTIN Take 1 Tab by mouth every eight (8) hours. Max Daily Amount: 90 mg.  
  
 multivitamin tablet Commonly known as:  ONE A DAY Take 1 Tab by mouth daily. oxyCODONE IR 15 mg immediate release tablet Commonly known as:  OXY-IR Take 1 tab every 3 to 4 hours as needed for pain  
  
 prochlorperazine 10 mg tablet Commonly known as:  COMPAZINE Take 5 mg by mouth every six (6) hours as needed. RE-AZO CRANBERRY PO Take  by mouth. SENNA PLUS 8.6-50 mg per tablet Generic drug:  senna-docusate Take 2 Tabs by mouth three (3) times daily. Prescriptions Printed Refills  
 oxyCODONE IR (OXY-IR) 15 mg immediate release tablet 0 Sig: Take 1 tab every 3 to 4 hours as needed for pain  
 Class: Print  
 morphine CR (MS CONTIN) 30 mg CR tablet 0 Sig: Take 1 Tab by mouth every eight (8) hours. Max Daily Amount: 90 mg.  
 Class: Print Route: Oral  
 diazePAM (VALIUM) 5 mg tablet 0 Sig: Take 1 Tab by mouth every eight (8) hours as needed (spasm). Max Daily Amount: 15 mg.  
 Class: Print Route: Oral  
  
Prescriptions Sent to Pharmacy Refills  
 lansoprazole (PREVACID 24HR) 15 mg capsule 3 Sig: Take 1 Cap by mouth daily. Class: Normal  
 Pharmacy: [facility]/pharmacy #[id number] - [address] Ph #: [phone]  Route: Oral  
  
To-Do List   
 09/20/2017 9:00 AM  
 Appointment with Andres Matt MD; [facility] ANGIO 1 at [facility] RAD [address] [address] ([phone]) DIET RESTRICTIONS NPO, do not eat or drink 8 hours prior to test. Take all medications not requiring food with sip of water, excluding blood thinners and diabetic medications. GENERAL INSTRUCTIONS 1. Bring any non [facility] films/images pertaining to the area of interest with you on the day of appointment. 2. Bring a list of all medications you are currently taking, including over the counter medications. 3. A valid order with Physicians signature is required for all scheduled tests. 4. Blood thinners and platelet inhibitors must be stopped 3-5 days prior to procedure. Consult your ordering physician prior to stopping them. 5. Time given is ARRIVAL time, not procedure time. 6. You must have a  present before a procedure is started. 7. The procedure may last 1-1 ½ hours. You may be required to stay 4-6 hours after the procedure for observation. If you have any questions please direct them to your ordering physician. Patient Instructions Dear Blaze Voss , It was a pleasure seeing you in the Palliative Medicine Office today. This is what we talked about:  
 
1. Your abdominal pain 
-You've been having more pain 
-I'm increasing long-acting morphine to 30-mg every 8 hours. You can take 2 of the morphine 15-mg every 8 hours until you finish up the pills you have   
-Continue oxycodone 15-mg: take this every 3 to 4 hours as needed 
-Continue dexamethasone 2-mg daily in the morning twice daily 
-Start Prevacid 15-mg daily to protect your stomach lining from irritation due to the dexamethasone 2. Your nausea 
-This hasn't been a problem for you since you left the hospital 
-Continue ondansetron 4-mg every 8 hours as needed 
-I prescribed Phenergan 25-mg suppositories every 6 hours as needed as an additional anti-nausea medication 3. Your constipation -You're moving your bowels regularly with 1 senna three times a day and Miralax or an enema as needed 
-You take pro-biotics twice a day 4. Your appetite 
-Your appetite is improved after increasing the dose of dexamethasone 
-You had a lot of side effects from Creon 5. Your fatigue 
-You continue to feel fatigued but this is unchanged since we last saw you in clinic 6. Your mood 
-You are feeling better today than you felt when you were here last month 
-You met again today with one of our clinical social workers, Roselia Cowan 
-You plan on seeing her on an as needed basis 
-It's important to you to stay positive and to live each day fully 7. Your cancer 
-You've decided to stop chemo to concentrate on your quality of life This is what you have shared with us about Advance Care Planning Advance Care Planning 6/14/2017 Patient's Healthcare Decision Maker is: Named in scanned ACP document Primary Decision Maker Name Ana Paula Acevedo Primary Decision Maker Phone Number [phone] Primary Decision Maker Relationship to Patient Spouse Secondary Decision Maker Name - Secondary Decision Maker Relationship to Patient -  
Confirm Advance Directive Yes, on file Does the patient have other document types Do Not Resuscitate The Palliative Medicine Team is here to support you and your family. We will see you again in 4 weeks. Our Nurse Navigator Samantha Espinoza or Ondina Toscano will check in with you by phone before that time. If there are any concerns before that time, such as medication questions, worsening symptoms or a need to see a physician for an urgent or emergent situation; please call [phone] ([facility]). A physician is also on call after our normal business hours of 8am to 5pm.  
 
In order to serve you better, please allow up to 48 hours for prescription refills to be processed.  Certain medications may require more paperwork or a written prescription that you may need to  from the office. We appreciate you letting us know of any refill requests as soon as possible. We also would like you to sign up for [employer] as well. Sincerely, Elin Justice MD and the Palliative Medicine Team 
 
 
  
Introducing [facility]! Dear Ruthy Jimenez: Thank you for requesting a [employer] account. Our records indicate that you already have an active [employer] account. You can access your account anytime at [email or web address] Did you know that you can access your hospital and ER discharge instructions at any time in [employer]? You can also review all of your test results from your hospital stay or ER visit. Additional Information If you have questions, please visit the Frequently Asked Questions section of the [employer] website at [email or web address] Remember, [employer] is NOT to be used for urgent needs. For medical emergencies, dial 911. Now available from your iPhone and Android! Please provide this summary of care documentation to your next provider. Your primary care clinician is listed as Ivone Estrella. If you have any questions after today's visit, please call [phone].

## 2017-09-13 NOTE — PROGRESS NOTES
Palliative Medicine Office Visit  Palliative Medicine Nurse Check In  [phone]    Patient Name: Flora Toth  YOB: 1955      Date of Office Visit:  9/13/2017      Patient states: She is here for a follow up and review of pain medication. From Check In Sheet (scanned in Media):  Has a medical provider talked with you about cardiopulmonary resuscitation (CPR)? [x] Yes   [] No   [] Unable to obtain    Nurse reminder to complete or update ACP FlowSheet:    Is ACP on the Problem List?    [x] Yes    [] No  IF ACP Document is ON FILE; Nurse to place ACP on Problem List     Is there an ACP Note in Chart Review/Note? [x] Yes    [] No   If NO: [address] 6/14/2017   Patient's Healthcare Decision Maker is: Named in scanned ACP document   Primary Decision Maker Name Karoline Galvez   Primary Decision Maker Phone Number [phone]   Primary Decision Maker Relationship to Patient Spouse   Secondary Decision Maker Name -   Secondary Decision Maker Relationship to Patient -   Confirm Advance Directive Yes, on file   Does the patient have other document types Do Not Resuscitate       Is there anything that we should know about you as a person in order to provide you the best care possible? ' I need to have my pain medication reviewed, I have a loss of appetite. \"    Have you been to the ER, urgent care clinic since your last visit? [] Yes   [x] No   [] Unable to obtain    Have you been hospitalized since your last visit? [] Yes   [x] No   [] Unable to obtain    Have you seen or consulted any other health care providers outside of the [address] [place] [address] since your last visit?    [] Yes   [x] No   [] Unable to obtain    Functional status (describe):      Last BM: 9/13/2017       accessed (date): 9/12/17    Bottle review (for opioid pain medication):  Medication 1:  Diazepam 5mg tab   Date filled: 7/24/17  Directions: 1 tab by mouth every 8hrs as needed  # filled: 30  # left: 11  # pills taking per day:1  Last dose taken: 8:30PM    Medication 2:  Oxycodone IR 15mg tab  Date filled: 9/6/17  Directions: 1 tab by mouth every 4hrs as needed  # filled: 180  # left: 139  # pills taking per day: 4 tabs  Last dose taken: 8:30AM    Medication 3:  Morphine Sulf ER 15mg tab  Date filled: 9/6/17  Directions: 1 tab  by mouth 2x day  # filled:  60  # left: 52  # pills taking per day: 3 tabs  Last dose taken: 4:30PM    Medication 4:   Date filled:   Directions:   # filled:   # left:   # pills taking per day:  Last dose taken:

## 2017-09-13 NOTE — PROGRESS NOTES
Palliative Medicine Social Work Note    This LCSW met with pt when in [address] for scheduled appt with MD (Rosey Al). Purpose of SW visit: Follow up on supportive counseling on 8/16/17. (See SW note, 8/17/17.) Pt presents as composed, smiling. Stated interactions with  have improved since sharing thoughts and feelings during previous SW visit. Stated she feels better. Shared details of that session with her sister-in-law. Believes YUMIKO told pt's  about their conversation because he no longer talks to her about his plans after her death. Also more aware of how his activities affect her. Stated for now this is enough. Declined further meetings with LCSW; prefers no phone calls. These serve only to remind her that she is sick. At this point, she said she is striving to feel normal and establish a routine that is nourishing. Will reach out, if needed. SW affirmed pt establishing boundaries and making decisions that are in her best interest.    Plan:  1. Remain available to provide psychosocial support to pt, as needed. Natacha Barbosa., [facility]    Work: [phone]  Cell:   [phone]

## 2017-09-13 NOTE — PATIENT INSTRUCTIONS
Dear Blaze Voss ,    It was a pleasure seeing you in the Palliative Medicine Office today. This is what we talked about:     1. Your abdominal pain  -You've been having more pain  -I'm increasing long-acting morphine to 30-mg every 8 hours. You can take 2 of the morphine 15-mg every 8 hours until you finish up the pills you have    -Continue oxycodone 15-mg: take this every 3 to 4 hours as needed  -Continue dexamethasone 2-mg daily in the morning twice daily  -Start Prevacid 15-mg daily to protect your stomach lining from irritation due to the dexamethasone    2. Your nausea  -This hasn't been a problem for you since you left the hospital  -Continue ondansetron 4-mg every 8 hours as needed  -I prescribed Phenergan 25-mg suppositories every 6 hours as needed as an additional anti-nausea medication    3. Your constipation  -You're moving your bowels regularly with 1 senna three times a day and Miralax or an enema as needed  -You take pro-biotics twice a day    4. Your appetite  -Your appetite is improved after increasing the dose of dexamethasone  -You had a lot of side effects from Creon     5. Your fatigue  -You continue to feel fatigued but this is unchanged since we last saw you in clinic    6. Your mood  -You are feeling better today than you felt when you were here last month  -You met again today with one of our clinical social workers, Darel Harada  -You plan on seeing her on an as needed basis  -It's important to you to stay positive and to live each day fully     7.  Your cancer  -You've decided to stop chemo to concentrate on your quality of life    This is what you have shared with us about [address]. Planning 6/14/2017   Patient's Healthcare Decision Maker is: Named in scanned ACP document   Primary Decision Maker Name Robyn Rowley   Primary Decision Maker Phone Number [phone]   Primary Decision Maker Relationship to Patient Spouse   Secondary Decision Maker Name - Secondary Decision Maker Relationship to Patient -   Confirm Advance Directive Yes, on file   Does the patient have other document types Do Not Resuscitate       The Palliative Medicine Team is here to support you and your family. We will see you again in 4 weeks. Our Nurse Navigator Brayan Robb or Khoa Palmer will check in with you by phone before that time. If there are any concerns before that time, such as medication questions, worsening symptoms or a need to see a physician for an urgent or emergent situation; please call [phone] ([phone]). A physician is also on call after our normal business hours of 8am to 5pm.     In order to serve you better, please allow up to 48 hours for prescription refills to be processed. Certain medications may require more paperwork or a written prescription that you may need to  from the office. We appreciate you letting us know of any refill requests as soon as possible. We also would like you to sign up for [employer] as well.     Sincerely,      Ashok Bingham MD and the Palliative Medicine Team

## 2017-09-20 NOTE — DISCHARGE INSTRUCTIONS
[address] [facility]  Department of Interventional Radiology  [facility]    PORT REMOVAL  DISCHARGE INSTRUCTIONS    General Information:     Your doctor has ordered for us to remove your drain, port, or catheter. This could be that you do not need it anymore, it is not doing its job, your physician has decided on another plan for your treatment and/or it may need replacing. Home Care Instructions: You can resume your regular diet and medication regimen. Do not drink alcohol, drive, or make any important legal decisions in the next 24 hours. Do not lift anything heavier than a gallon of milk, or do anything strenuous for the next 24 hours. You will notice a dressing over the site of the removal. The dressing may be removed in 3 days. You have dermabond surgical glue closing your incision. This glue will peel and fall off on its own. Resume your normal level of activity slowly. You may shower after 24 hours, but do not take a bath, swim or immerse yourself in water until the site has healed, and keep the dressing dry with plastic wrap while showering. The site may ooze for a couple of days. This drainage should lessen with each passing day. Call If:     You should call your Physician and/or the Radiology Nurse if you have any bleeding other than a small spot on your bandage. Call if you have any signs of infection, fever, or increased pain at the site. . Call if the oozing increases, if it changes color, or begins to have an odor. Follow-Up Instructions: Please see your ordering doctor as he/she has requested. To Reach Us:    Side effects of sedation medications and other medications used today have been reviewed. Notify us of nausea, itching, hives, dizziness, or anything else out of the ordinary.        Should you experience any of these significant changes, please call [phone] between the hours of 7:30 am and 10 pm or [phone] after hours.  After hours, ask the  to page the [address] Technologist, and describe the problem to the technologist.         Patient Signature:  Date: 9/20/2017  Discharging Nurse: Leah Murrieta RN

## 2017-09-20 NOTE — IP AVS SNAPSHOT
[facility] [facility] [address] [place] [address] 
[phone] Patient: Blaze Voss MRN: [id number] OLZ:[id number] You are allergic to the following No active allergies Recent Documentation Height Weight BMI OB Status Smoking Status 1.702 m 88 kg 30.38 kg/m2 Postmenopausal Former Smoker Emergency Contacts Name Discharge Info Relation Home Work Mobile Iván Gtz DISCHARGE CAREGIVER [3] Spouse [3]   [phone] About your hospitalization You were admitted on:  September 20, 2017 You last received care in the:  [facility] IR You were discharged on:  September 20, 2017 Unit phone number:  [phone] Why you were hospitalized Your primary diagnosis was:  Not on File Providers Seen During Your Hospitalizations Provider Role Specialty Primary office phone Jose Cid MD Attending Provider Hematology and Oncology [phone] Your Primary Care Physician (PCP) Primary Care Physician Office Phone Office Fax Carlos Andres [phone] Follow-up Information None Your Appointments Wednesday October 11, 2017  9:30 AM EDT Follow Up with Charmayne Rick, MD  
[address] 1200 [facility] [address] [place] [address]  
[phone] Current Discharge Medication List  
  
ASK your doctor about these medications Dose & Instructions Dispensing Information Comments Morning Noon Evening Bedtime  
 bisacodyl 10 mg suppository Commonly known as:  DULCOLAX Your last dose was: Your next dose is:    
   
   
 Dose:  10 mg Insert 10 mg into rectum daily. Quantity:  30 Suppository Refills:  2 CLARITIN 10 mg tablet Generic drug:  loratadine Your last dose was:     
   
Your next dose is:    
   
   
 Dose:  10 mg  
 Take 10 mg by mouth. Refills:  0  
     
   
   
   
  
 CULTURELLE 10 billion cell capsule Generic drug:  lactobacillus rhamnosus gg 10 billion cell Your last dose was: Your next dose is:    
   
   
 Dose:  2 Cap Take 2 Caps by mouth daily. Refills:  0  
     
   
   
   
  
 dexamethasone 2 mg tablet Commonly known as:  DECADRON Your last dose was: Your next dose is:    
   
   
 2 tabs by mouth daily. Quantity:  60 Tab Refills:  1  
     
   
   
   
  
 diazePAM 5 mg tablet Commonly known as:  VALIUM Your last dose was: Your next dose is:    
   
   
 Dose:  5 mg Take 1 Tab by mouth every eight (8) hours as needed (spasm). Max Daily Amount: 15 mg. Quantity:  30 Tab Refills:  0  
     
   
   
   
  
 lactulose 10 gram/15 mL solution Commonly known as:  Kathryn Carine Your last dose was: Your next dose is:    
   
   
 Dose:  20 g Take 30 mL by mouth three (3) times daily. Quantity:  480 mL Refills:  1  
     
   
   
   
  
 lansoprazole 15 mg capsule Commonly known as:  PREVACID 24HR Your last dose was: Your next dose is:    
   
   
 Dose:  15 mg Take 1 Cap by mouth daily. Quantity:  30 Cap Refills:  3 MIRALAX 17 gram/dose powder Generic drug:  polyethylene glycol Your last dose was: Your next dose is:    
   
   
 Dose:  17 g Take 17 g by mouth as needed. Refills:  0  
     
   
   
   
  
 morphine CR 30 mg CR tablet Commonly known as:  MS CONTIN Your last dose was: Your next dose is:    
   
   
 Dose:  30 mg Take 1 Tab by mouth every eight (8) hours. Max Daily Amount: 90 mg. Quantity:  90 Tab Refills:  0  
     
   
   
   
  
 multivitamin tablet Commonly known as:  ONE A DAY Your last dose was: Your next dose is:    
   
   
 Dose:  1 Tab Take 1 Tab by mouth daily. Refills:  0 oxyCODONE IR 15 mg immediate release tablet Commonly known as:  OXY-IR Your last dose was: Your next dose is: Take 1 tab every 3 to 4 hours as needed for pain Quantity:  180 Tab Refills:  0  
 - Patient with pancreatic cancer - C25.9  
    
   
   
   
  
 prochlorperazine 10 mg tablet Commonly known as:  COMPAZINE Your last dose was: Your next dose is:    
   
   
 Dose:  5 mg Take 5 mg by mouth every six (6) hours as needed. Refills:  0  
     
   
   
   
  
 RE-AZO CRANBERRY PO Your last dose was: Your next dose is: Take  by mouth. Refills:  0 SENNA PLUS 8.6-50 mg per tablet Generic drug:  senna-docusate Your last dose was: Your next dose is:    
   
   
 Dose:  2 Tab Take 2 Tabs by mouth three (3) times daily. Refills:  0 Discharge Instructions [address] [facility] 
Department of Interventional Radiology [address] [place] [address] PORT REMOVAL DISCHARGE INSTRUCTIONS General Information: 
   Your doctor has ordered for us to remove your drain, port, or catheter. This could be that you do not need it anymore, it is not doing its job, your physician has decided on another plan for your treatment and/or it may need replacing. Home Care Instructions: You can resume your regular diet and medication regimen. Do not drink alcohol, drive, or make any important legal decisions in the next 24 hours. Do not lift anything heavier than a gallon of milk, or do anything strenuous for the next 24 hours. You will notice a dressing over the site of the removal. The dressing may be removed in 3 days. You have dermabond surgical glue closing your incision. This glue will peel and fall off on its own. Resume your normal level of activity slowly.  You may shower after 24 hours, but do not take a bath, swim or immerse yourself in water until the site has healed, and keep the dressing dry with plastic wrap while showering. The site may ooze for a couple of days. This drainage should lessen with each passing day. Call If: 
   You should call your Physician and/or the Radiology Nurse if you have any bleeding other than a small spot on your bandage. Call if you have any signs of infection, fever, or increased pain at the site. . Call if the oozing increases, if it changes color, or begins to have an odor. Follow-Up Instructions: Please see your ordering doctor as he/she has requested. To Reach Us:   
Side effects of sedation medications and other medications used today have been reviewed. Notify us of nausea, itching, hives, dizziness, or anything else out of the ordinary. Should you experience any of these significant changes, please call [phone] between the hours of 7:30 am and 10 pm or [phone] after hours. After hours, ask the  to page the [address] [place] [address] Way Technologist, and describe the problem to the technologist.  
 
 
 
Patient Signature: 
Date: 9/20/2017 Discharging Nurse: Cam Mulligan RN Discharge Orders None Introducing [facility]! Dear Anupam Tinoco: Thank you for requesting a [employer] account. Our records indicate that you already have an active [employer] account. You can access your account anytime at [email or web address] Did you know that you can access your hospital and ER discharge instructions at any time in [employer]? You can also review all of your test results from your hospital stay or ER visit. Additional Information If you have questions, please visit the Frequently Asked Questions section of the [employer] website at [email or web address] Remember, [employer] is NOT to be used for urgent needs. For medical emergencies, dial 911. Now available from your iPhone and Android! General Information Please provide this summary of care documentation to your next provider. Patient Signature:  ____________________________________________________________ Date:  ____________________________________________________________  
  
Gearldine Moulds Provider Signature:  ____________________________________________________________ Date:  ____________________________________________________________

## 2017-09-20 NOTE — PROCEDURES
PROCEDURE:Port removal.  INDICATION:nonfunction. ANESTHESIA:sedation and local.  COMPLICATION:NONE. SPECIMENS REMOVED:none. BLOOD LOSS:NONE. /ASSISTANT:SIXTO Harper RECOMMENDATIONS:none. CONSENT OBTAINED:YES.  NOTES:none.

## 2017-09-20 NOTE — H&P
Radiology History and Physical    Patient: Charles Gonzalez 60 y.o. female     Chief Complaint: No chief complaint on file. History of Present Illness: Port removal.    History:    Past Medical History:   Diagnosis Date    Allergic cough 10/5/2010    AR (allergic rhinitis)     cough    GERD (gastroesophageal reflux disease)     Tx FOR \"REALLY BAD HEARTBURN\" IN 2013    Nausea & vomiting     PMS (premenstrual syndrome) 12/26/14    IN PAST, WAS SEVERE; MENOPAUSE 10 YRS AGO, PT SAYS    Postmenopause, LMP 49yo, NO HRT 10/5/2010    S/P normal colonoscopy 2004 10/5/2010    Unspecified sleep apnea 12/26/14    USES CPAP     Family History   Problem Relation Age of Onset    Stroke Mother     Heart Disease Mother     Heart Disease Father     Arthritis-rheumatoid Sister     Cancer Brother      skin cancer    HIV/AIDS Brother     Stroke Brother 79     TIA's    Heart Disease Brother 79     CABG    Stroke Maternal Grandmother     Heart Attack Maternal Grandfather     Heart Attack Paternal Grandmother     Heart Attack Paternal Grandfather     Colon Cancer Sister     Anesth Problems Neg Hx      Social History     Social History    Marital status:      Spouse name: N/A    Number of children: 0    Years of education: N/A     Occupational History    Homemaker; 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 [address] Alcohol use No      Comment: very very rare    Drug use: No    Sexual activity: Not Currently     Other Topics Concern    Occupational Exposure No     down to 1 cup of cooffee qam; was up to 3-4 cups up unitl ~ the past few weeks    Weight Concern Yes     happy she has lost 20 lbs since strict diet and exercise    Special Diet Yes     since beginning of August went on strict diet of carb cuonting, low fat, protein and calorie counts; well balanced    Exercise Yes     swims a lot and since August started walking about 2-5 x a week x 45 minutes     Social History Narrative       Allergies: No Known Allergies    Current Medications:  Current Facility-Administered Medications   Medication Dose Route Frequency    0.9% sodium chloride infusion 500 mL  500 mL IntraVENous CONTINUOUS    saline peripheral flush soln 10 mL  10 mL InterCATHeter PRN    midazolam (VERSED) injection 5 mg  5 mg IntraVENous Multiple    fentaNYL citrate (PF) injection 200 mcg  200 mcg IntraVENous Multiple        Physical Exam:  Blood pressure 149/62, pulse (!) 54, resp. rate 16, height 5' 7\" (1.702 m), weight 88 kg (194 lb), last menstrual period 10/01/2005, SpO2 100 %. GENERAL: alert, cooperative, no distress, appears stated age, LUNG: clear to auscultation bilaterally, HEART: regular rate and rhythm      Alerts:    Hospital Problems  Date Reviewed: 8/31/2017    None          Laboratory:    No results for input(s): HGB, HCT, WBC, PLT, INR, BUN, CREA, K, CRCLT, HGBEXT, HCTEXT, PLTEXT in the last 72 hours. No lab exists for component: PTT, PT, INREXT      Plan of Care/Planned Procedure:  Risks, benefits, and alternatives reviewed with patient and she agrees to proceed with the procedure.

## 2017-10-09 NOTE — TELEPHONE ENCOUNTER
Called patient to advise/confirm upcoming appt with Dr. Sharla Arzate on 10/11/17     at 9:30am at [facility]. No answer so left voicemail. Also advised to please bring in your Drivers License and Insurance Card with you to appointment as well as any prescription pain medication in the original container with you to appt.

## 2017-10-11 NOTE — MR AVS SNAPSHOT
Visit Information Date & Time Provider Department Dept. Phone Encounter #  
 10/11/2017  9:30 AM Keisha Bennett MD [address] [id number] Upcoming Health Maintenance Date Due Hepatitis C Screening 1955 Pneumococcal 19-64 Highest Risk (1 of 3 - PCV13) 1/20/1973 FOBT Q 1 YEAR AGE 50-75 1/20/2004 PAP AKA CERVICAL CYTOLOGY 10/12/2013 ZOSTER VACCINE AGE 60> 11/20/2013 INFLUENZA AGE 9 TO ADULT 8/1/2017 BREAST CANCER SCRN MAMMOGRAM 12/15/2018 DTaP/Tdap/Td series (2 - Td) 10/5/2020 Allergies as of 10/11/2017  Review Complete On: 10/11/2017 By: Breonna Rubalcava LPN No Known Allergies Current Immunizations  Reviewed on 8/3/2017 Name Date TDAP Vaccine 10/5/2010 Not reviewed this visit Vitals BP Pulse Temp Resp Height(growth percentile) Weight(growth percentile) 142/61 (BP 1 Location: Left arm, BP Patient Position: Sitting) 60 96.3 °F (35.7 °C) (Oral) 16 5' 7\" (1.702 m) 197 lb 9.6 oz (89.6 kg) LMP SpO2 BMI OB Status Smoking Status 10/01/2005 (Approximate) 97% 30.95 kg/m2 Postmenopausal Former Smoker BMI and BSA Data Body Mass Index Body Surface Area 30.95 kg/m 2 2.06 m 2 Preferred Pharmacy Pharmacy Name Phone [id number] [place]/ Andrea Singh [facility] [phone] Your Updated Medication List  
  
   
This list is accurate as of: 10/11/17 10:53 AM.  Always use your most recent med list.  
  
  
  
  
 bisacodyl 10 mg suppository Commonly known as:  DULCOLAX Insert 10 mg into rectum daily. CLARITIN 10 mg tablet Generic drug:  loratadine Take 10 mg by mouth. CULTURELLE 10 billion cell capsule Generic drug:  lactobacillus rhamnosus gg 10 billion cell Take 2 Caps by mouth daily. dexamethasone 2 mg tablet Commonly known as:  DECADRON  
2 tabs by mouth daily. diazePAM 5 mg tablet Commonly known as:  VALIUM Take 1 Tab by mouth every eight (8) hours as needed (spasm). Max Daily Amount: 15 mg.  
  
 lactulose 10 gram/15 mL solution Commonly known as:  Jackolyn Cool Take 30 mL by mouth three (3) times daily. lansoprazole 15 mg capsule Commonly known as:  PREVACID 24HR Take 1 Cap by mouth daily. MIRALAX 17 gram/dose powder Generic drug:  polyethylene glycol Take 17 g by mouth as needed. morphine CR 30 mg CR tablet Commonly known as:  MS CONTIN Take 1 Tab by mouth every eight (8) hours. Max Daily Amount: 90 mg. Start taking on:  10/20/2017  
  
 multivitamin tablet Commonly known as:  ONE A DAY Take 1 Tab by mouth daily. oxyCODONE IR 15 mg immediate release tablet Commonly known as:  OXY-IR Take 1 tab every 3 to 4 hours as needed for pain Start taking on:  11/3/2017  
  
 prochlorperazine 10 mg tablet Commonly known as:  COMPAZINE Take 5 mg by mouth every six (6) hours as needed. RE-AZO CRANBERRY PO Take  by mouth. SENNA PLUS 8.6-50 mg per tablet Generic drug:  senna-docusate Take 2 Tabs by mouth three (3) times daily. Prescriptions Printed Refills  
 oxyCODONE IR (OXY-IR) 15 mg immediate release tablet 0 Starting on: 11/3/2017 Sig: Take 1 tab every 3 to 4 hours as needed for pain  
 Class: Print  
 morphine CR (MS CONTIN) 30 mg CR tablet 0 Starting on: 10/20/2017 Sig: Take 1 Tab by mouth every eight (8) hours. Max Daily Amount: 90 mg.  
 Class: Print Route: Oral  
 diazePAM (VALIUM) 5 mg tablet 0 Sig: Take 1 Tab by mouth every eight (8) hours as needed (spasm). Max Daily Amount: 15 mg.  
 Class: Print Route: Oral  
  
Patient Instructions Dear Praveena Black , It was a pleasure seeing you in the Palliative Medicine Office today. This is what we talked about:  
 
1.  Your abdominal pain 
-Your pain is under good control and you are happy with your current pain medication regimen 
-Continue long-acting morphine 30-mg every 8 hours 
-Continue oxycodone 15-mg every 3 to 4 hours as needed. You typically take 6 a day which works well for you. 
-Continue dexamethasone 2-mg daily in the morning. 
-Start Prevacid 15-mg daily to protect your stomach lining from irritation due to the dexamethasone 2. Your nausea 
-This hasn't been a problem for you since you left the hospital 
-Continue ondansetron 4-mg every 8 hours as needed 
-I prescribed Phenergan 25-mg suppositories every 6 hours as needed as an additional anti-nausea medication 3. Your constipation 
-You're moving your bowels regularly with 1 senna three times a day and Miralax or an enema as needed 
-You take pro-biotics twice a day 4. Your appetite 
-You are eating well and maintaining your weight 
-You are not taking Creon because of unacceptable side effects 5. Your fatigue 
-You continue to feel fatigued but this is unchanged since we last saw you in clinic 
-You've been more active, enjoying going to craft festivals on the weekends with your  6. Your mood 
-You are feeling good! 
-It's important to you to stay positive and to live each day fully 7. Your cancer 
-You've decided to stop chemo to concentrate on your quality of life This is what you have shared with us about Advance Care Planning Advance Care Planning 6/14/2017 Patient's Healthcare Decision Maker is: Named in scanned ACP document Primary Decision Maker Name Didier Redding Primary Decision Maker Phone Number [phone] Primary Decision Maker Relationship to Patient Spouse Secondary Decision Maker Name - Secondary Decision Maker Relationship to Patient -  
Confirm Advance Directive Yes, on file Does the patient have other document types Do Not Resuscitate The Palliative Medicine Team is here to support you and your family. We will see you again in 4 weeks. Our Nurse Navigator Melba Lovell or Ortiz Vaughn will check in with you by phone before that time. If there are any concerns before that time, such as medication questions, worsening symptoms or a need to see a physician for an urgent or emergent situation; please call [phone] (MEMO). A physician is also on call after our normal business hours of 8am to 5pm.  
 
In order to serve you better, please allow up to 48 hours for prescription refills to be processed. Certain medications may require more paperwork or a written prescription that you may need to  from the office. We appreciate you letting us know of any refill requests as soon as possible. We also would like you to sign up for [employer] as well. Sincerely, Glenis Harvey MD and the Palliative Medicine Team 
 
 
  
Introducing [facility]! Dear Mary Alice Dias: Thank you for requesting a [employer] account. Our records indicate that you already have an active [employer] account. You can access your account anytime at [email or web address] Did you know that you can access your hospital and ER discharge instructions at any time in [employer]? You can also review all of your test results from your hospital stay or ER visit. Additional Information If you have questions, please visit the Frequently Asked Questions section of the [employer] website at [email or web address] Remember, [facility] [facility] is NOT to be used for urgent needs. For medical emergencies, dial 911. Now available from your iPhone and Android! Please provide this summary of care documentation to your next provider. Your primary care clinician is listed as Elana Helm. If you have any questions after today's visit, please call [phone].

## 2017-10-11 NOTE — PROGRESS NOTES
Palliative Medicine Outpatient Services  [place]: [phone] ([phone]    Patient Name: Mali Graham  YOB: 1955    Date of Current Visit: 10/11/17  Location of Current Visit:    [x] [facility] Office  [] [facility] Office  [] [facility] Office  [] Home  [] Other:      Date of Initial Visit: 5/12/17  Requesting Physician: Michele Lorenz MD  Primary Care Physician: Braeden Klein DO      SUMMARY:   Mali Graham is a 60y.o. year old with a past history of pancreatic cancer, who was referred to Palliative Medicine by Dr. Celine Dickerson for symptom management and supportive care. She was diagnosed in 5/2017 after months of progressive abdominal pain, fatigue and weight loss. She was treated with gemcitabine and abraxane which was stopped early due to patient preference. She declines further chemotherapy and is focusing upon maintaining quality of life. The patients social history includes: she's been  to [place] for 36 years. They have no children. She is a . She has a brother living in [place] and a sister in [place]. She has a personal relationship with God. She loves reading, biking, pool, documentaries, and history. Palliative Medicine is addressing the following current patient/family concerns: abdominal pain, nausea, constipation, poor appetite with early satiety, fatigue. PALLIATIVE DIAGNOSES:       ICD-10-CM ICD-9-CM    1. Abdominal pain, generalized R10.84 789.07    2. Nausea without vomiting R11.0 787.02    3. Drug-induced constipation K59.03 564.09      E980.5    4. Poor appetite R63.0 783.0    5. Neoplastic malignant related fatigue R53.0 780.79    6. Depression, unspecified depression type F32.9 311    7. Malignant neoplasm of body of pancreas (HCC) C25.1 157.1           PLAN:     Patient Instructions     Dear Mali Graham ,    It was a pleasure seeing you in the Palliative Medicine Office today. This is what we talked about:     1.  Your abdominal pain  -Your pain is under good control and you are happy with your current pain medication regimen  -Continue long-acting morphine 30-mg every 8 hours  -Continue oxycodone 15-mg every 3 to 4 hours as needed. You typically take 6 a day which works well for you.  -Continue dexamethasone 2-mg daily in the morning.  -Start Prevacid 15-mg daily to protect your stomach lining from irritation due to the dexamethasone    2. Your nausea  -This hasn't been a problem for you since you left the hospital  -Continue ondansetron 4-mg every 8 hours as needed  -I prescribed Phenergan 25-mg suppositories every 6 hours as needed as an additional anti-nausea medication    3. Your constipation  -You're moving your bowels regularly with 1 senna three times a day and Miralax or an enema as needed  -You take pro-biotics twice a day    4. Your appetite  -You are eating well and maintaining your weight  -You are not taking Creon because of unacceptable side effects    5. Your fatigue  -You continue to feel fatigued but this is unchanged since we last saw you in clinic  -You've been more active, enjoying going to craft festivals on the weekends with your     6. Your mood  -You are feeling good!  -It's important to you to stay positive and to live each day fully     7. Your cancer  -You've decided to stop chemo to concentrate on your quality of life    This is what you have shared with us about Noediann Temo Fraser. Planning 6/14/2017   Patient's Healthcare Decision Maker is: Named in scanned ACP document   Primary Decision Maker Name James Pop   Primary Decision Maker Phone Number [phone]   Primary Decision Maker Relationship to Patient Spouse   Secondary Decision Maker Name -   Secondary Decision Maker Relationship to Patient -   Confirm Advance Directive Yes, on file   Does the patient have other document types Do Not Resuscitate       The Palliative Medicine Team is here to support you and your family. We will see you again in 4 weeks.     Our Nurse Navigator Rupinder Kong or Tomas will check in with you by phone before that time. If there are any concerns before that time, such as medication questions, worsening symptoms or a need to see a physician for an urgent or emergent situation; please call [phone] (COPE). A physician is also on call after our normal business hours of 8am to 5pm.     In order to serve you better, please allow up to 48 hours for prescription refills to be processed. Certain medications may require more paperwork or a written prescription that you may need to  from the office. We appreciate you letting us know of any refill requests as soon as possible. We also would like you to sign up for [employer] as well.     Sincerely,      Matthew Toro MD and the Palliative Medicine Team      Counseling and Coordination: see plan       GOALS OF CARE / TREATMENT PREFERENCES:   [====Goals of Care====]  GOALS OF CARE:  Patient / health care proxy stated goals: maintain quality of life and keep a positive outlook      TREATMENT PREFERENCES:   Code Status:  [] Attempt Resuscitation       [x] Do Not Attempt Resuscitation    Advance Care Planning:  Advance Care Planning 6/14/2017   Patient's Healthcare Decision Maker is: Named in scanned ACP document   Primary Decision Maker Name Kaela Cintron   Primary Decision Maker Phone Number [phone]   Primary Decision Maker Relationship to Patient Spouse   Secondary Decision Maker Name -   Secondary Decision Maker Relationship to Patient -   Confirm Advance Directive Yes, on file   Does the patient have other document types Do Not Resuscitate       Other:  (If patient appropriate for POST, consider using PALLPOST smart phrase here)    The palliative care team has discussed with patient / health care proxy about goals of care / treatment preferences for patient.  [====Goals of Care====]         PRESCRIPTIONS GIVEN:     Medications Ordered Today   Medications    oxyCODONE IR (OXY-IR) 15 mg immediate release tablet     Sig: Take 1 tab every 3 to 4 hours as needed for pain     Dispense:  180 Tab     Refill:  0     Patient with pancreatic cancer  C25.9    morphine CR (MS CONTIN) 30 mg CR tablet     Sig: Take 1 Tab by mouth every eight (8) hours. Max Daily Amount: 90 mg. Dispense:  90 Tab     Refill:  0    diazePAM (VALIUM) 5 mg tablet     Sig: Take 1 Tab by mouth every eight (8) hours as needed (spasm). Max Daily Amount: 15 mg. Dispense:  60 Tab     Refill:  0           FOLLOW UP:     No future appointments. PHYSICIANS INVOLVED IN CARE:   Patient Care Team:  Eugene Zuñiga DO as PCP - General (Family Practice)  Yifan Oliver RN as Nurse Navigator (Oncology)  Carmen Braun MD (Hematology and Oncology)  Renetta Patton MD as Physician (Palliative Medicine)       HISTORY:   Nursing documentation from date of visit reviewed. Reviewed patient-completed ESAS and advance care planning form. Reviewed patient record in prescription monitoring program.    CHIEF COMPLAINT: abdominal pain, nausea, constipation, poor appetite    HPI/SUBJECTIVE:    The patient is: [x] Verbal / [] Nonverbal     Her pain is so much better on the higher dose of morphine. She's taking the oxycodone every 4 hours, getting up a few times during the night to take it. She's happy with this regimen. She decreased her dexamethasone back to once daily. She's had no nausea. She continues to take pro-biotics twice daily. She's had no heartburn. .  She's moving her bowels regularly with senna and occasional use of Miralax or an enema. Her appetite is good. Her energy isn't as high as it once was but she's getting out and doing more. She and her  have gone to a few weekend craft festivals. She's feeling better today. She and her  are getting along better. She overall feels so much better since she stopped chemo which just didn't agree with her.       Clinical Pain Assessment (nonverbal scale for nonverbal patients):   [++++ Clinical Pain Assessment++++]  [++++Pain Severity++++]: Pain: 0  [++++Pain Character++++]: aching abdominal pain; hurts to wear a bra but its not skin pain  [++++Pain Duration++++]: months with increasing severity  [++++Pain Effect++++]: affects energy  [++++Pain Factors++++]: unable to name provoking factors; morphine and oxycodone help  [++++Pain Frequency++++]: constant  [++++Pain Location++++]: lower back across abdomen and underneath both breasts  [++++ Clinical Pain Assessment++++]       FUNCTIONAL ASSESSMENT:     Palliative Performance Scale (PPS):  PPS: 80       PSYCHOSOCIAL/SPIRITUAL SCREENING:     Any spiritual / Catholic concerns:  [] Yes /  [x] No    Caregiver Burnout:  [] Yes /  [] No /  [x] No Caregiver Present      Anticipatory grief assessment:   [x] Normal  / [] Maladaptive       ESAS Anxiety: Anxiety: 0    ESAS Depression: Depression: 0       REVIEW OF SYSTEMS:     The following systems were [x] reviewed / [] unable to be reviewed  Systems: constitutional, ears/nose/mouth/throat, respiratory, gastrointestinal, genitourinary, musculoskeletal, integumentary, neurologic, psychiatric, endocrine. Positive findings noted below. Modified ESAS Completed by: patient   Fatigue: 5 Drowsiness: 0   Depression: 0 Pain: 0   Anxiety: 0 Nausea: 0   Anorexia: 0 Dyspnea: 0   Best Well-Bein Constipation: No   Other Problem (Comment): 0          PHYSICAL EXAM:     Wt Readings from Last 3 Encounters:   10/11/17 197 lb 9.6 oz (89.6 kg)   17 194 lb (88 kg)   17 197 lb 4.8 oz (89.5 kg)     Blood pressure 142/61, pulse 60, temperature 96.3 °F (35.7 °C), temperature source Oral, resp. rate 16, height 5' 7\" (1.702 m), weight 197 lb 9.6 oz (89.6 kg), last menstrual period 10/01/2005, SpO2 97 %.   Last bowel movement: See Nursing Note    Constitutional: sitting in chair, appears relaxed and comfortable, upbeat  Eyes: pupils equal, anicteric  ENMT: no nasal discharge, moist mucous membranes  Cardiovascular: regular rhythm, distal pulses intact  Respiratory: breathing not labored, symmetric  Gastrointestinal: soft non-tender, +bowel sounds  Musculoskeletal: no deformity, no tenderness to palpation, trace bilateral lower extremity edema  Skin: warm, dry  Neurologic: following commands, moving all extremities  Psychiatric: full affect, no hallucinations  Other:       HISTORY:     Past Medical History:   Diagnosis Date    Allergic cough 10/5/2010    AR (allergic rhinitis)     cough    GERD (gastroesophageal reflux disease)     Tx FOR \"REALLY BAD HEARTBURN\" IN 2013    Nausea & vomiting     PMS (premenstrual syndrome) 12/26/14    IN PAST, WAS SEVERE; MENOPAUSE 10 YRS AGO, PT SAYS    Postmenopause, LMP 49yo, NO HRT 10/5/2010    S/P normal colonoscopy 2004 10/5/2010    Unspecified sleep apnea 12/26/14    USES CPAP      Past Surgical History:   Procedure Laterality Date    DEXA BONE DENSITY STUDY AXIAL  2/2004    Normal; Dr Kanika Adams (prev Gyn)    DILATION AND CURETTAGE  34yo    benign AUB; negative    ENDOSCOPY, COLON, DIAGNOSTIC  12/26/14    LAST COLONOSCOPY JAN 2014; PRIOR SCOPE 2004    HX RHINOPLASTY  1986    w/ chin implant (mentoplasty)    HX WISDOM TEETH EXTRACTION      NE OUTER EAR SURGERY PROC UNLISTED  1981    excision benign tumor behind right ear      Family History   Problem Relation Age of Onset    Stroke Mother     Heart Disease Mother     Heart Disease Father     Arthritis-rheumatoid Sister     Cancer Brother      skin cancer    HIV/AIDS Brother     Stroke Brother 79     TIA's    Heart Disease Brother 79     CABG    Stroke Maternal Grandmother     Heart Attack Maternal Grandfather     Heart Attack Paternal Grandmother     Heart Attack Paternal Grandfather     Colon Cancer Sister     Anesth Problems Neg Hx       History reviewed, no pertinent family history.   Social History   Substance Use Topics    Smoking status: Former Smoker     Years: 6.00     Quit date: 9/27/1980    Smokeless tobacco: Never Used      Comment: used to smoke about 2 packs a week from college 1974 through . [address]unn 38 Alcohol use No      Comment: very very rare     No Known Allergies   Current Outpatient Prescriptions   Medication Sig    oxyCODONE IR (OXY-IR) 15 mg immediate release tablet Take 1 tab every 3 to 4 hours as needed for pain    morphine CR (MS CONTIN) 30 mg CR tablet Take 1 Tab by mouth every eight (8) hours. Max Daily Amount: 90 mg.    diazePAM (VALIUM) 5 mg tablet Take 1 Tab by mouth every eight (8) hours as needed (spasm). Max Daily Amount: 15 mg.    dexamethasone (DECADRON) 2 mg tablet 2 tabs by mouth daily.  lactobacillus rhamnosus gg 10 billion cell (CULTURELLE) 10 billion cell capsule Take 2 Caps by mouth daily.  CRANBERRY FRUIT CONCENTRATE (RE-AZO CRANBERRY PO) Take  by mouth.  senna-docusate (SENNA PLUS) 8.6-50 mg per tablet Take 2 Tabs by mouth three (3) times daily.  lansoprazole (PREVACID 24HR) 15 mg capsule Take 1 Cap by mouth daily.  polyethylene glycol (MIRALAX) 17 gram/dose powder Take 17 g by mouth as needed.  multivitamin (ONE A DAY) tablet Take 1 Tab by mouth daily.  loratadine (CLARITIN) 10 mg tablet Take 10 mg by mouth.  prochlorperazine (COMPAZINE) 10 mg tablet Take 5 mg by mouth every six (6) hours as needed.  bisacodyl (DULCOLAX) 10 mg suppository Insert 10 mg into rectum daily.  lactulose (CHRONULAC) 10 gram/15 mL solution Take 30 mL by mouth three (3) times daily. No current facility-administered medications for this visit.            LAB DATA REVIEWED:     Lab Results   Component Value Date/Time    WBC 6.2 08/03/2017 09:45 AM    HGB 11.2 08/03/2017 09:45 AM    PLATELET 018 10/19/9294 09:45 AM     Lab Results   Component Value Date/Time    Sodium 134 08/03/2017 09:45 AM    Potassium 3.8 08/03/2017 09:45 AM    Chloride 100 08/03/2017 09:45 AM    CO2 27 08/03/2017 09:45 AM    BUN 9 08/03/2017 09:45 AM    Creatinine 0.57 08/03/2017 09:45 AM    Calcium 8.9 08/03/2017 09:45 AM    Magnesium 2.4 07/17/2017 02:42 PM      Lab Results   Component Value Date/Time    AST (SGOT) 23 08/03/2017 09:45 AM    Alk.  phosphatase 81 08/03/2017 09:45 AM    Protein, total 6.5 08/03/2017 09:45 AM    Albumin 3.6 08/03/2017 09:45 AM    Globulin 2.9 08/03/2017 09:45 AM     No results found for: INR, PTMR, PTP, PT1, PT2, APTT   No results found for: IRON, FE, TIBC, IBCT, PSAT, FERR        CONTROLLED SUBSTANCES SAFETY ASSESSMENT (IF ON CONTROLLED SUBSTANCES):     Reviewed opioid safety handout:  [x] Yes   [] No  24 hour opioid dose >150mg morphine equivalent/day:  [] Yes   [x] No  Benzodiazepines:  [] Yes   [x] No  Sleep apnea:  [x] Yes   [] No  Urine Toxicology Testing within last 6 months:  [] Yes   [x] No  History of or new aberrant medication taking behaviors:  [] Yes   [] No            Total time:   Counseling / coordination time:   > 50% counseling / coordination?:

## 2017-10-11 NOTE — PROGRESS NOTES
Palliative Medicine Office Visit  Palliative Medicine Nurse Check In  [phone]    Patient Name: Bakari Stephen  YOB: 1955      Date of Office Visit: 10/11/2017      Patient states: \" I am here for a follow up appointment, I feel a lot better since I stopped Chemo\". From Check In Sheet (scanned in Media):  Has a medical provider talked with you about cardiopulmonary resuscitation (CPR)? [x] Yes   [] No   [] Unable to obtain    Nurse reminder to complete or update ACP FlowSheet:    Is ACP on the Problem List?    [x] Yes    [] No  IF ACP Document is ON FILE; Nurse to place ACP on Problem List     Is there an ACP Note in Chart Review/Note? [x] Yes    [] No   If NO: [address] 6/14/2017   Patient's Healthcare Decision Maker is: Named in scanned ACP document   Primary Decision Maker Name Virginie Nielsen   Primary Decision Maker Phone Number [phone]   Primary Decision Maker Relationship to Patient Spouse   Secondary Decision Maker Name -   Secondary Decision Maker Relationship to Patient -   Confirm Advance Directive Yes, on file   Does the patient have other document types Do Not Resuscitate       Is there anything that we should know about you as a person in order to provide you the best care possible? \"I had my port removed on 9/20/17\". Have you been to the ER, urgent care clinic since your last visit? [] Yes   [x] No   [] Unable to obtain    Have you been hospitalized since your last visit? [] Yes   [x] No   [] Unable to obtain    Have you seen or consulted any other health care providers outside of the [address] [place] [address] since your last visit?    [] Yes   [x] No   [] Unable to obtain    Functional status (describe):          Last BM: 10/10/17     accessed (date):  10/10/17    Bottle review (for opioid pain medication):  Medication 1:   Oxycodone IR15mg tab  Date filled:  10/3/17  Directions: 1 tab by mouth every 3-4 hrs as needed  # filled:  180  # left:  161  # pills taking per day: 6  Last dose taken: 7AM  Medication 2:  Morphine Sulf ER 30mg  Date filled:9/14/17   Directions:  1 tab every 8hrs  # filled: 90  # left: 31  # pills taking per day: 3  Last dose taken: 7AM    Medication 3:   Date filled:   Directions:   # filled:   # left:   # pills taking per day:  Last dose taken:    Medication 4:   Date filled:   Directions:   # filled:   # left:   # pills taking per day:  Last dose taken:

## 2017-10-11 NOTE — PATIENT INSTRUCTIONS
Dear Cynthia Dunn ,    It was a pleasure seeing you in the Palliative Medicine Office today. This is what we talked about:     1. Your abdominal pain  -Your pain is under good control and you are happy with your current pain medication regimen  -Continue long-acting morphine 30-mg every 8 hours  -Continue oxycodone 15-mg every 3 to 4 hours as needed. You typically take 6 a day which works well for you.  -Continue dexamethasone 2-mg daily in the morning.  -Start Prevacid 15-mg daily to protect your stomach lining from irritation due to the dexamethasone    2. Your nausea  -This hasn't been a problem for you since you left the hospital  -Continue ondansetron 4-mg every 8 hours as needed  -I prescribed Phenergan 25-mg suppositories every 6 hours as needed as an additional anti-nausea medication    3. Your constipation  -You're moving your bowels regularly with 1 senna three times a day and Miralax or an enema as needed  -You take pro-biotics twice a day    4. Your appetite  -You are eating well and maintaining your weight  -You are not taking Creon because of unacceptable side effects    5. Your fatigue  -You continue to feel fatigued but this is unchanged since we last saw you in clinic  -You've been more active, enjoying going to craft festivals on the weekends with your     6. Your mood  -You are feeling good!  -It's important to you to stay positive and to live each day fully     7.  Your cancer  -You've decided to stop chemo to concentrate on your quality of life    This is what you have shared with us about [address] Planning 6/14/2017   Patient's Healthcare Decision Maker is: Named in scanned ACP document   Primary Decision Maker Name Adri Gonzáles   Primary Decision Maker Phone Number [phone]   Primary Decision Maker Relationship to Patient Spouse   Secondary Decision Maker Name -   Secondary Decision Maker Relationship to Patient -   Confirm Advance Directive Yes, on file   Does the patient have other document types Do Not Resuscitate       The Palliative Medicine Team is here to support you and your family. We will see you again in 4 weeks. Our Nurse Navigator Charly Richards or Joe Mcgowan will check in with you by phone before that time. If there are any concerns before that time, such as medication questions, worsening symptoms or a need to see a physician for an urgent or emergent situation; please call [phone] (COPE). A physician is also on call after our normal business hours of 8am to 5pm.     In order to serve you better, please allow up to 48 hours for prescription refills to be processed. Certain medications may require more paperwork or a written prescription that you may need to  from the office. We appreciate you letting us know of any refill requests as soon as possible. We also would like you to sign up for [employer] as well.     Sincerely,      Renetta Patton MD and the Palliative Medicine Team

## 2017-10-23 NOTE — PROGRESS NOTES
Palliative Medicine  Nursing Note  [phone]  Fax [phone]        Clinic Office Visit  Patient Name: Bk Mcqueen  YOB: 1955    10/23/2017      Advance Care Planning 6/14/2017   Patient's Healthcare Decision Maker is: Named in scanned ACP document   Primary Decision Maker Name Chetan Prakash   Primary Decision Maker Phone Number [phone]   Primary Decision Maker Relationship to Patient Spouse   Secondary Decision Maker Name -   Secondary Decision Maker Relationship to Patient -   Confirm Advance Directive Yes, on file   Does the patient have other document types Do Not Resuscitate       Return call place to [facility] regarding her question about her upcoming appointments and wanted to make sure she would have enough medication to last until she comes in to see Dr. Kath Massey on 11/8/2017. We asked that she call us back if she has anymore questions.         Amelia Salvador, GALN, RN, OCN, VIA [facility]  Palliative Medicine

## 2017-11-07 NOTE — TELEPHONE ENCOUNTER
Called patient to advise/confirm upcoming appt with Dr. Estelle Farias on   11/8/17   at 11:30am at [facility]. No answer so left voicemail. Also advised to please bring in your Drivers License and Insurance Card with you to appointment as well as any prescription pain medication in the original container with you to appt.

## 2017-11-08 NOTE — PROGRESS NOTES
Palliative Medicine Social Work Note    This LCSW met with pt when in [address] for scheduled appt with MD (Shay Ken). SW visit prior to MD exam and ROS. Purpose of SW visit: 1) Re-engagement and assessment of current psychosocial needs. Pt presents as energetic and animated. Affect appropriate; smiled, laughed, and shared tiffany of where she is at this time. Stated she did not think she would be alive at this point in the year. Appreciates the support of Palliative Medicine team. Interventions and support has helped her resolved some issues with her  that affected her quality of life. Said at this point, Cari Leal is a completely different person. \" More supportive of her. They have planned activities together, including a business trip to [place] later this month. They will stay in a hotel suite rather take their motor home. A hotel will allow for a more relaxing experience. Over Christmas, they plan to spend a week in [facility], a destination they both love. Recently she went on a tour of 8 homes in [place]. Loved seeing how each one was decorated. Found the walking and climbing stairs tiring, but was determined. Decided she would see 7 out of the 8, a reasonable compromise. SW affirmed pt's feelings and experiences. Encouraged her to reach out for support, if needed. Plan:  1. Provide support and assistance, upon request.      Janalyn Kanner.  Annabelle Yoon., [facility]    Work: [phone]  Cell:   [phone]

## 2017-11-08 NOTE — MR AVS SNAPSHOT
Visit Information Date & Time Provider Department Dept. Phone Encounter #  
 11/8/2017 11:30 AM Coral Dior MD Palliative [address] [id number] Your Appointments 12/4/2017  9:30 AM  
Follow Up with Coral Dior MD  
Palliative [place] ([address]) Appt Note: follow up, cp0.00, pb0.00, cnc 11/8/17 200 [facility] [address] [facility] [address]  
[phone]  
  
   
 [address] [address] Upcoming Health Maintenance Date Due Hepatitis C Screening 1955 Pneumococcal 19-64 Highest Risk (1 of 3 - PCV13) 1/20/1973 FOBT Q 1 YEAR AGE 50-75 1/20/2004 PAP AKA CERVICAL CYTOLOGY 10/12/2013 ZOSTER VACCINE AGE 60> 11/20/2013 Influenza Age 5 to Adult 8/1/2017 BREAST CANCER SCRN MAMMOGRAM 12/15/2018 DTaP/Tdap/Td series (2 - Td) 10/5/2020 Allergies as of 11/8/2017  Review Complete On: 10/11/2017 By: Coral Dior MD  
 No Known Allergies Current Immunizations  Reviewed on 8/3/2017 Name Date TDAP Vaccine 10/5/2010 Not reviewed this visit Vitals BP Pulse Temp Resp Height(growth percentile) Weight(growth percentile) 166/85 (BP 1 Location: Right arm, BP Patient Position: Sitting) (!) 59 96.6 °F (35.9 °C) (Oral) 16 5' 7\" (1.702 m) 205 lb 9.6 oz (93.3 kg) LMP SpO2 BMI OB Status Smoking Status 10/01/2005 (Approximate) 99% 32.2 kg/m2 Postmenopausal Former Smoker Vitals History BMI and BSA Data Body Mass Index Body Surface Area  
 32.2 kg/m 2 2.1 m 2 Preferred Pharmacy Pharmacy Name Phone [phone] Your Updated Medication List  
  
   
This list is accurate as of: 11/8/17 12:33 PM.  Always use your most recent med list.  
  
  
  
  
 bisacodyl 10 mg suppository Commonly known as:  DULCOLAX Insert 10 mg into rectum daily. CLARITIN 10 mg tablet Generic drug:  loratadine Take 10 mg by mouth. CULTURELLE 10 billion cell capsule Generic drug:  lactobacillus rhamnosus gg 10 billion cell Take 2 Caps by mouth daily. dexamethasone 2 mg tablet Commonly known as:  DECADRON  
2 tabs by mouth daily. diazePAM 5 mg tablet Commonly known as:  VALIUM Take 1 Tab by mouth every eight (8) hours as needed (spasm). Max Daily Amount: 15 mg.  
  
 lactulose 10 gram/15 mL solution Commonly known as:  Maximiano Ros Take 30 mL by mouth three (3) times daily. lansoprazole 15 mg capsule Commonly known as:  PREVACID 24HR Take 1 Cap by mouth daily. MIRALAX 17 gram/dose powder Generic drug:  polyethylene glycol Take 17 g by mouth as needed. morphine CR 30 mg CR tablet Commonly known as:  MS CONTIN Take 1 Tab by mouth every eight (8) hours. Max Daily Amount: 90 mg. Start taking on:  11/19/2017  
  
 multivitamin tablet Commonly known as:  ONE A DAY Take 1 Tab by mouth daily. oxyCODONE IR 15 mg immediate release tablet Commonly known as:  OXY-IR Take 1 tab every 3 to 4 hours as needed for pain  
  
 prochlorperazine 10 mg tablet Commonly known as:  COMPAZINE Take 5 mg by mouth every six (6) hours as needed. RE-AZO CRANBERRY PO Take  by mouth. SENNA PLUS 8.6-50 mg per tablet Generic drug:  senna-docusate Take 2 Tabs by mouth three (3) times daily. Prescriptions Printed Refills  
 diazePAM (VALIUM) 5 mg tablet 0 Sig: Take 1 Tab by mouth every eight (8) hours as needed (spasm). Max Daily Amount: 15 mg.  
 Class: Print Route: Oral  
 oxyCODONE IR (OXY-IR) 15 mg immediate release tablet 0 Sig: Take 1 tab every 3 to 4 hours as needed for pain  
 Class: Print  
 morphine CR (MS CONTIN) 30 mg CR tablet 0 Starting on: 11/19/2017 Sig: Take 1 Tab by mouth every eight (8) hours. Max Daily Amount: 90 mg.  
 Class: Print  Route: Oral  
  
 To-Do List   
 12/28/2017 9:30 AM  
  Appointment with [facility] AGNIESZKA 1 at [address] ([phone]) Shower or bathe using soap and water. Do not use deodorant, powder, perfumes, or lotion the day of your exam.  If your prior mammograms were not performed at [facility] 6 please bring films with you or forward prior images 2 days before your procedure. Check in at registration 15min before your appointment time unless you were instructed to do otherwise. A script is not necessary, but if you have one, please bring it on the day of the mammogram or have it faxed to the department. [facility] [phone] [facility]  [phone] [facility]  [phone] [facility] [phone] [address] [phone] Patient Instructions Dear Prema Roberto , It was a pleasure seeing you in the Palliative Medicine Office today. This is what we talked about:  
 
1. Your pain 
-Continue long-acting morphine 30-mg every 8 hours 
-Continue oxycodone 15-mg every 3 to 4 hours as needed. You typically take 6 a day which works well for you. 
-Continue dexamethasone 2-mg: decrease to 1/2 tab daily in the morning. 
-If you notice your pain increases with the lower dose of dexamethasone, you can go back to taking the whole pill 
-Start Prevacid 15-mg daily to protect your stomach lining from irritation due to the dexamethasone 2. Your fatigue 
-You continue to feel fatigued but this is unchanged since we last saw you in clinic 
-You've been more active, enjoying going to craft festivals on the weekends with your  3. Your appetite 
-You are eating well, enjoying foods that you haven't been able to eat for awhile 
-You've been gaining weight which is likely due to the dexamethasone 
-You are going to try to decrease the dexamethasone dose 4. Your mood 
-You're feeling great! 
-It's important to you to stay positive and to live each day fully 5. Your nausea -This hasn't been a recent problem 
-Continue ondansetron 4-mg every 8 hours as needed 
-I prescribed Phenergan 25-mg suppositories every 6 hours as needed as an additional anti-nausea medication 6. Your constipation 
-You're moving your bowels regularly with 1 senna three times a day and Miralax or an enema as needed 
-You take pro-biotics twice a day 7. Your cancer 
-You've decided to stop chemo to concentrate on your quality of life This is what you have shared with us about Advance Care Planning Advance Care Planning 6/14/2017 Patient's Healthcare Decision Maker is: Named in scanned ACP document Primary Decision Maker Name Barry Moody Primary Decision Maker Phone Number [phone] Primary Decision Maker Relationship to Patient Spouse Secondary Decision Maker Name - Secondary Decision Maker Relationship to Patient -  
Confirm Advance Directive Yes, on file Does the patient have other document types Do Not Resuscitate The Palliative Medicine Team is here to support you and your family. We will see you again in 4 weeks. Our Nurse Navigator Tao Tavarez or Andreas Guadarrama will check in with you by phone before that time. If there are any concerns before that time, such as medication questions, worsening symptoms or a need to see a physician for an urgent or emergent situation; please call [phone] (XEUY). A physician is also on call after our normal business hours of 8am to 5pm.  
 
In order to serve you better, please allow up to 48 hours for prescription refills to be processed. Certain medications may require more paperwork or a written prescription that you may need to  from the office. We appreciate you letting us know of any refill requests as soon as possible. We also would like you to sign up for [employer] as well. Sincerely, Victoria Armendariz MD and the Palliative Medicine Team 
 
 
  
Introducing [facility] SERVICES! Dear Huong Vargas: Thank you for requesting a [employer] account. Our records indicate that you already have an active [employer] account. You can access your account anytime at [email or web address]. [employer]/[facility] Did you know that you can access your hospital and ER discharge instructions at any time in [employer]? You can also review all of your test results from your hospital stay or ER visit. Additional Information If you have questions, please visit the Frequently Asked Questions section of the [employer] website at [email or web address] [employer]/[facility]/. Remember, [employer] is NOT to be used for urgent needs. For medical emergencies, dial 911. Now available from your iPhone and Android! Please provide this summary of care documentation to your next provider. Your primary care clinician is listed as Adele Schneider. If you have any questions after today's visit, please call [phone].

## 2017-11-08 NOTE — PATIENT INSTRUCTIONS
Dear Sharona Glass ,    It was a pleasure seeing you in the Palliative Medicine Office today. This is what we talked about:     1. Your pain  -Continue long-acting morphine 30-mg every 8 hours  -Continue oxycodone 15-mg every 3 to 4 hours as needed. You typically take 6 a day which works well for you.  -Continue dexamethasone 2-mg: decrease to 1/2 tab daily in the morning.  -If you notice your pain increases with the lower dose of dexamethasone, you can go back to taking the whole pill  -Start Prevacid 15-mg daily to protect your stomach lining from irritation due to the dexamethasone    2. Your fatigue  -You continue to feel fatigued but this is unchanged since we last saw you in clinic  -You've been more active, enjoying going to craft festivals on the weekends with your     3. Your appetite  -You are eating well, enjoying foods that you haven't been able to eat for awhile  -You've been gaining weight which is likely due to the dexamethasone  -You are going to try to decrease the dexamethasone dose    4. Your mood  -You're feeling great!  -It's important to you to stay positive and to live each day fully     5. Your nausea  -This hasn't been a recent problem  -Continue ondansetron 4-mg every 8 hours as needed  -I prescribed Phenergan 25-mg suppositories every 6 hours as needed as an additional anti-nausea medication    6. Your constipation  -You're moving your bowels regularly with 1 senna three times a day and Miralax or an enema as needed  -You take pro-biotics twice a day    7.  Your cancer  -You've decided to stop chemo to concentrate on your quality of life    This is what you have shared with us about [address]. Planning 6/14/2017   Patient's Healthcare Decision Maker is: Named in scanned ACP document   Primary Decision Maker Name Kaela Cintron   Primary Decision Maker Phone Number [phone]   Primary Decision Maker Relationship to Patient Spouse   Secondary Decision Maker Name -   Secondary Decision Maker Relationship to Patient -   Confirm Advance Directive Yes, on file   Does the patient have other document types Do Not Resuscitate       The Palliative Medicine Team is here to support you and your family. We will see you again in 4 weeks. Our Nurse Navigator David Mcdaniel or Trell Montalvo will check in with you by phone before that time. If there are any concerns before that time, such as medication questions, worsening symptoms or a need to see a physician for an urgent or emergent situation; please call [phone] (COPE). A physician is also on call after our normal business hours of 8am to 5pm.     In order to serve you better, please allow up to 48 hours for prescription refills to be processed. Certain medications may require more paperwork or a written prescription that you may need to  from the office. We appreciate you letting us know of any refill requests as soon as possible. We also would like you to sign up for [employer] as well.     Sincerely,      Bharati Taylor MD and the Palliative Medicine Team

## 2017-11-08 NOTE — PROGRESS NOTES
Palliative Medicine Office Visit  Palliative Medicine Nurse Check In  [phone]    Patient Name: Paula Carr  YOB: 1955      Date of Office Visit: 11/8/2017      Patient states: \" I am here for a follow up appointment I noticed that when I climb stairs I feel short winded otherwise I feel good\". From Check In Sheet (scanned in Media):  Has a medical provider talked with you about cardiopulmonary resuscitation (CPR)? [x] Yes   [] No   [] Unable to obtain    Nurse reminder to complete or update ACP FlowSheet:    Is ACP on the Problem List?    [x] Yes    [] No  IF ACP Document is ON FILE; Nurse to place ACP on Problem List     Is there an ACP Note in Chart Review/Note? [x] Yes    [] No   If NO: [address] 6/14/2017   Patient's Healthcare Decision Maker is: Named in scanned ACP document   Primary Decision Maker Name Alfredo Sharma   Primary Decision Maker Phone Number [phone]   Primary Decision Maker Relationship to Patient Spouse   Secondary Decision Maker Name -   Secondary Decision Maker Relationship to Patient -   Confirm Advance Directive Yes, on file   Does the patient have other document types Do Not Resuscitate       Is there anything that we should know about you as a person in order to provide you the best care possible? No    Have you been to the ER, urgent care clinic since your last visit? [] Yes   [x] No   [] Unable to obtain    Have you been hospitalized since your last visit? [] Yes   [x] No   [] Unable to obtain    Have you seen or consulted any other health care providers outside of the [address] [place] [address] since your last visit?    [] Yes   [x] No   [] Unable to obtain    Functional status (describe):       Last BM:  11/8/2017       accessed (date):  11/7/17    Bottle review (for opioid pain medication):  Medication 1: Morphine Sulf ER 30mg tab  Date filled: 10/20/17  Directions: 1 tab by mouth every 8hrs  # filled: 90  # left: 34 with 6 at home  # pills taking per day: 3  Last dose taken: 5AM    Medication 2:  Oxycodone IR 15mg tab  Date filled: 10/3/17  Directions: 1 tab by mouth every 3-4 hrs as needed  # filled: 180  # left: 19 with 6 at home  # pills taking per day: 6  Last dose taken: 11:30AM    Medication 3:   Date filled:   Directions:   # filled:   # left:   # pills taking per day:  Last dose taken:    Medication 4:   Date filled:   Directions:   # filled:   # left:   # pills taking per day:  Last dose taken:

## 2017-11-08 NOTE — PROGRESS NOTES
Palliative Medicine Outpatient Services  [place]: [phone] ([phone])    Patient Name: Mónica Mcmanus  YOB: 1955    Date of Current Visit: 11/08/17  Location of Current Visit:    [x] [facility] Office  [] [facility] Office  [] [facility] Office  [] Home  [] Other:      Date of Initial Visit: 5/12/17  Requesting Physician: Claudio Medeiros MD  Primary Care Physician: Kiran Guzman DO      SUMMARY:   Mónica Mcmanus is a 60y.o. year old with a past history of pancreatic cancer, who was referred to Palliative Medicine by Dr. Maru Alvarado for symptom management and supportive care. She was diagnosed in 5/2017 after months of progressive abdominal pain, fatigue and weight loss. She was treated with gemcitabine and abraxane which was stopped early due to patient preference. She declines further chemotherapy and is focusing upon maintaining quality of life. The patients social history includes: she's been  to [place] for 36 years. They have no children. She is a . She has a brother living in [place] and a sister in [place]. She has a personal relationship with God. She loves reading, biking, pool, documentaries, and history. Palliative Medicine is addressing the following current patient/family concerns: abdominal pain, nausea, constipation, poor appetite with early satiety, fatigue. PALLIATIVE DIAGNOSES:       ICD-10-CM ICD-9-CM    1. Abdominal pain, generalized R10.84 789.07    2. Neoplastic malignant related fatigue R53.0 780.79    3. Poor appetite R63.0 783.0    4. Nausea without vomiting R11.0 787.02    5. Drug-induced constipation K59.03 564.09      E980.5    6. Depression, unspecified depression type F32.9 311    7. Malignant neoplasm of body of pancreas (HCC) C25.1 157.1           PLAN:     Patient Instructions     Dear Mónica Mcmanus ,    It was a pleasure seeing you in the Palliative Medicine Office today. This is what we talked about:     1.  Your pain  -Continue long-acting morphine 30-mg every 8 hours  -Continue oxycodone 15-mg every 3 to 4 hours as needed. You typically take 6 a day which works well for you.  -Continue dexamethasone 2-mg: decrease to 1/2 tab daily in the morning.  -If you notice your pain increases with the lower dose of dexamethasone, you can go back to taking the whole pill  -Start Prevacid 15-mg daily to protect your stomach lining from irritation due to the dexamethasone    2. Your fatigue  -You continue to feel fatigued but this is unchanged since we last saw you in clinic  -You've been more active, enjoying going to craft festivals on the weekends with your     3. Your appetite  -You are eating well, enjoying foods that you haven't been able to eat for awhile  -You've been gaining weight which is likely due to the dexamethasone  -You are going to try to decrease the dexamethasone dose    4. Your mood  -You're feeling great!  -It's important to you to stay positive and to live each day fully     5. Your nausea  -This hasn't been a recent problem  -Continue ondansetron 4-mg every 8 hours as needed  -I prescribed Phenergan 25-mg suppositories every 6 hours as needed as an additional anti-nausea medication    6. Your constipation  -You're moving your bowels regularly with 1 senna three times a day and Miralax or an enema as needed  -You take pro-biotics twice a day    7.  Your cancer  -You've decided to stop chemo to concentrate on your quality of life    This is what you have shared with us about [address]. Planning 6/14/2017   Patient's Healthcare Decision Maker is: Named in scanned ACP document   Primary Decision Maker Name Segundo Perkins   Primary Decision Maker Phone Number [phone]   Primary Decision Maker Relationship to Patient Spouse   Secondary Decision Maker Name -   Secondary Decision Maker Relationship to Patient -   Confirm Advance Directive Yes, on file   Does the patient have other document types Do Not Resuscitate       The Palliative Medicine Team is here to support you and your family. We will see you again in 4 weeks. Our Nurse Navigator David Hernandez or Gonzalo Jo will check in with you by phone before that time. If there are any concerns before that time, such as medication questions, worsening symptoms or a need to see a physician for an urgent or emergent situation; please call [phone] (COPE). A physician is also on call after our normal business hours of 8am to 5pm.     In order to serve you better, please allow up to 48 hours for prescription refills to be processed. Certain medications may require more paperwork or a written prescription that you may need to  from the office. We appreciate you letting us know of any refill requests as soon as possible. We also would like you to sign up for [employer] as well.     Sincerely,      Annetta Bhat MD and the Palliative Medicine Team      Counseling and Coordination: see plan       GOALS OF CARE / TREATMENT PREFERENCES:   [====Goals of Care====]  GOALS OF CARE:  Patient / health care proxy stated goals: maintain quality of life and keep a positive outlook      TREATMENT PREFERENCES:   Code Status:  [] Attempt Resuscitation       [x] Do Not Attempt Resuscitation    Advance Care Planning:  Advance Care Planning 6/14/2017   Patient's Healthcare Decision Maker is: Named in scanned ACP document   Primary Decision Maker Name Hema Barreto   Primary Decision Maker Phone Number [phone]   Primary Decision Maker Relationship to Patient Spouse   Secondary Decision Maker Name -   Secondary Decision Maker Relationship to Patient -   Confirm Advance Directive Yes, on file   Does the patient have other document types Do Not Resuscitate       Other:  (If patient appropriate for POST, consider using PALLPOST smart phrase here)    The palliative care team has discussed with patient / health care proxy about goals of care / treatment preferences for patient.  [====Goals of Care====] PRESCRIPTIONS GIVEN:     No orders of the defined types were placed in this encounter. FOLLOW UP:     Future Appointments  Date Time Provider Ben Borja   12/4/2017 9:30 AM Renetta Patton MD [address]   12/28/2017 9:30 AM [facility]           PHYSICIANS INVOLVED IN CARE:   Patient Care Team:  Eugene Zuñiga DO as PCP - General (Family Practice)  Yifan Oliver RN as Nurse Navigator (Oncology)  Carmen Braun MD (Hematology and Oncology)  Renetta Patton MD as Physician (Palliative Medicine)       HISTORY:   Nursing documentation from date of visit reviewed. Reviewed patient-completed ESAS and advance care planning form. Reviewed patient record in prescription monitoring program.    CHIEF COMPLAINT: abdominal pain, nausea, constipation, poor appetite    HPI/SUBJECTIVE:    The patient is: [x] Verbal / [] Nonverbal     She's great! She has no pain as long as she keep up with her pain medicine. She's been more active. She and her  went [place] [employer]. She found she became more fatigued than she expected from climbing the stairs but otherwise her energy is OK. She's going with her  to [place] when he goes for business and hopes to swim in the indoor pool. She and her  may try to keep their Thanksgiving tradition of going to [facility] for the holidays. Her appetite is good. She's now enjoying some foods that she hasn't been able to eat for awhile. She's gaining a lot of weight, though. She's had no nausea. She continues to take pro-biotics twice daily. She occasionally has  heartburn. .  She's moving her bowels regularly with senna and occasional use of Miralax or an enema. Her spirits are good. She overall feels so much better since she stopped chemo which just didn't agree with her.       Clinical Pain Assessment (nonverbal scale for nonverbal patients):   [++++ Clinical Pain Assessment++++]  [++++Pain Severity++++]: Pain: 0  [++++Pain Character++++]: aching abdominal pain; hurts to wear a bra but its not skin pain  [++++Pain Duration++++]: months with increasing severity  [++++Pain Effect++++]: affects energy  [++++Pain Factors++++]: unable to name provoking factors; morphine and oxycodone help  [++++Pain Frequency++++]: constant  [++++Pain Location++++]: lower back across abdomen and underneath both breasts  [++++ Clinical Pain Assessment++++]       FUNCTIONAL ASSESSMENT:     Palliative Performance Scale (PPS):  PPS: 80       PSYCHOSOCIAL/SPIRITUAL SCREENING:     Any spiritual / Druze concerns:  [] Yes /  [x] No    Caregiver Burnout:  [] Yes /  [] No /  [x] No Caregiver Present      Anticipatory grief assessment:   [x] Normal  / [] Maladaptive       ESAS Anxiety: Anxiety: 0    ESAS Depression: Depression: 0       REVIEW OF SYSTEMS:     The following systems were [x] reviewed / [] unable to be reviewed  Systems: constitutional, ears/nose/mouth/throat, respiratory, gastrointestinal, genitourinary, musculoskeletal, integumentary, neurologic, psychiatric, endocrine. Positive findings noted below. Modified ESAS Completed by: patient   Fatigue: 3 Drowsiness: 0   Depression: 0 Pain: 0   Anxiety: 0 Nausea: 0   Anorexia: 0 Dyspnea: 0   Best Well-Bein Constipation: No   Other Problem (Comment): 0          PHYSICAL EXAM:     Wt Readings from Last 3 Encounters:   17 205 lb 9.6 oz (93.3 kg)   10/11/17 197 lb 9.6 oz (89.6 kg)   17 194 lb (88 kg)     Blood pressure 166/85, pulse (!) 59, temperature 96.6 °F (35.9 °C), temperature source Oral, resp. rate 16, height 5' 7\" (1.702 m), weight 205 lb 9.6 oz (93.3 kg), last menstrual period 10/01/2005, SpO2 99 %.   Last bowel movement: See Nursing Note    Constitutional: sitting in chair, appears relaxed and comfortable, upbeat  Eyes: pupils equal, anicteric  ENMT: no nasal discharge, moist mucous membranes  Cardiovascular: regular rhythm, distal pulses intact  Respiratory: breathing not labored, symmetric  Gastrointestinal: soft non-tender, +bowel sounds  Musculoskeletal: no deformity, no tenderness to palpation, trace bilateral lower extremity edema  Skin: warm, dry  Neurologic: following commands, moving all extremities  Psychiatric: full affect, no hallucinations  Other:       HISTORY:     Past Medical History:   Diagnosis Date    Allergic cough 10/5/2010    AR (allergic rhinitis)     cough    GERD (gastroesophageal reflux disease)     Tx FOR \"REALLY BAD HEARTBURN\" IN 2013    Nausea & vomiting     PMS (premenstrual syndrome) 12/26/14    IN PAST, WAS SEVERE; MENOPAUSE 10 YRS AGO, PT SAYS    Postmenopause, LMP 51yo, NO HRT 10/5/2010    S/P normal colonoscopy 2004 10/5/2010    Unspecified sleep apnea 12/26/14    USES CPAP      Past Surgical History:   Procedure Laterality Date    DEXA BONE DENSITY STUDY AXIAL  2/2004    Normal; Dr Tyler Vinson (prev Gyn)    DILATION AND CURETTAGE  34yo    benign AUB; negative    ENDOSCOPY, COLON, DIAGNOSTIC  12/26/14    LAST COLONOSCOPY JAN 2014; PRIOR SCOPE 2004    HX RHINOPLASTY  1986    w/ chin implant (mentoplasty)    HX WISDOM TEETH EXTRACTION      [place] OUTER EAR SURGERY PROC UNLISTED  1981    excision benign tumor behind right ear      Family History   Problem Relation Age of Onset    Stroke Mother     Heart Disease Mother     Heart Disease Father     Arthritis-rheumatoid Sister     Cancer Brother      skin cancer    HIV/AIDS Brother     Stroke Brother 79     TIA's    Heart Disease Brother 79     CABG    Stroke Maternal Grandmother     Heart Attack Maternal Grandfather     Heart Attack Paternal Grandmother     Heart Attack Paternal Grandfather     Colon Cancer Sister     Anesth Problems Neg Hx       History reviewed, no pertinent family history.   Social History   Substance Use Topics    Smoking status: Former Smoker     Years: 6.00     Quit date: 9/27/1980    Smokeless tobacco: Never Used      Comment: used to smoke about 2 packs a week from college 1974 through . [address] Alcohol use No      Comment: very very rare     No Known Allergies   Current Outpatient Prescriptions   Medication Sig    oxyCODONE IR (OXY-IR) 15 mg immediate release tablet Take 1 tab every 3 to 4 hours as needed for pain    morphine CR (MS CONTIN) 30 mg CR tablet Take 1 Tab by mouth every eight (8) hours. Max Daily Amount: 90 mg.    diazePAM (VALIUM) 5 mg tablet Take 1 Tab by mouth every eight (8) hours as needed (spasm). Max Daily Amount: 15 mg.    lansoprazole (PREVACID 24HR) 15 mg capsule Take 1 Cap by mouth daily.  dexamethasone (DECADRON) 2 mg tablet 2 tabs by mouth daily. (Patient taking differently: Take 2 mg by mouth daily. 2 tabs by mouth daily.)    lactobacillus rhamnosus gg 10 billion cell (CULTURELLE) 10 billion cell capsule Take 2 Caps by mouth daily.  CRANBERRY FRUIT CONCENTRATE (RE-AZO CRANBERRY PO) Take  by mouth.  polyethylene glycol (MIRALAX) 17 gram/dose powder Take 17 g by mouth as needed.  multivitamin (ONE A DAY) tablet Take 1 Tab by mouth daily.  loratadine (CLARITIN) 10 mg tablet Take 10 mg by mouth.  prochlorperazine (COMPAZINE) 10 mg tablet Take 5 mg by mouth every six (6) hours as needed.  senna-docusate (SENNA PLUS) 8.6-50 mg per tablet Take 2 Tabs by mouth three (3) times daily.  bisacodyl (DULCOLAX) 10 mg suppository Insert 10 mg into rectum daily.  lactulose (CHRONULAC) 10 gram/15 mL solution Take 30 mL by mouth three (3) times daily. No current facility-administered medications for this visit.            LAB DATA REVIEWED:     Lab Results   Component Value Date/Time    WBC 6.2 08/03/2017 09:45 AM    HGB 11.2 08/03/2017 09:45 AM    PLATELET 330 44/71/6382 09:45 AM     Lab Results   Component Value Date/Time    Sodium 134 08/03/2017 09:45 AM    Potassium 3.8 08/03/2017 09:45 AM    Chloride 100 08/03/2017 09:45 AM    CO2 27 08/03/2017 09:45 AM    BUN 9 08/03/2017 09:45 AM Creatinine 0.57 08/03/2017 09:45 AM    Calcium 8.9 08/03/2017 09:45 AM    Magnesium 2.4 07/17/2017 02:42 PM      Lab Results   Component Value Date/Time    AST (SGOT) 23 08/03/2017 09:45 AM    Alk.  phosphatase 81 08/03/2017 09:45 AM    Protein, total 6.5 08/03/2017 09:45 AM    Albumin 3.6 08/03/2017 09:45 AM    Globulin 2.9 08/03/2017 09:45 AM     No results found for: INR, PTMR, PTP, PT1, PT2, APTT   No results found for: IRON, FE, TIBC, IBCT, PSAT, FERR        CONTROLLED SUBSTANCES SAFETY ASSESSMENT (IF ON CONTROLLED SUBSTANCES):     Reviewed opioid safety handout:  [x] Yes   [] No  24 hour opioid dose >150mg morphine equivalent/day:  [] Yes   [x] No  Benzodiazepines:  [] Yes   [x] No  Sleep apnea:  [x] Yes   [] No  Urine Toxicology Testing within last 6 months:  [] Yes   [x] No  History of or new aberrant medication taking behaviors:  [] Yes   [] No            Total time:   Counseling / coordination time:   > 50% counseling / coordination?:

## 2017-11-30 NOTE — TELEPHONE ENCOUNTER
Patient left voicemail yesterday.  Legs and ankles swelling wanted to know if it was from eating too many sweets and what could she do about it

## 2017-11-30 NOTE — TELEPHONE ENCOUNTER
Returned call to the patient regarding the swelling to her legs and ankles. She was concerned whether her eating pound cake every morning will cause increased swelling. She stated that she has had increased swelling to the areas the past 3 weeks. She verbalized there is no pain as a result. She was told that the foods she consumes can in fact play a part in the swelling, foods high in Carbohydrates and Sodium. The Steroids can also cause some swelling. She was encouraged to elevate her lower extremities while resting and drink water. She was advised to call Palliative back if she feels the situation has not improved. She was appreciate of all.

## 2017-12-04 NOTE — PATIENT INSTRUCTIONS
Dear Susanne Gu ,    It was a pleasure seeing you in the Palliative Medicine Office today. This is what we talked about:     1. Your pain  -Your pain is under great control!  -Continue long-acting morphine 30-mg every 8 hours  -Continue oxycodone 15-mg every 3 to 4 hours as needed. You typically take 6 a day which works well for you.  -You have a new pain when you lie on either side but this goes away when you lie on your back or sit up or stand. You don't have this pain during the day. -You could try buying a body pillow to prop yourself up so you can lie on either side without putting all your body weight on that side to see if that helps  -We're continuing your dexamethasone taper with the goal of tapering you completely off over the next 3 to 4 weeks  -Start dexamethasone 1-mg: take 1/2 tab daily for 7 days, then take 1/2 tab every other day for 7 days, then take 1/2 tab on Monday, Wednesday and Friday for 1 week, then stop    2. Your fatigue  -You've been feeling more fatigued   -You've been more active, enjoying going to craft festivals on the weekends with your     3. Your appetite  -You are eating well, enjoying foods that you haven't been able to eat for awhile  -You've been gaining weight which is likely due to the dexamethasone    4. Your mood  -You're feeling great!  -It's important to you to stay positive and to live each day fully   -You are understandably worried about your 's spinal stenosis and possibility of surgery    5. Your nausea  -This hasn't been a recent problem  -Continue ondansetron 4-mg every 8 hours as needed  -I prescribed Phenergan 25-mg suppositories every 6 hours as needed as an additional anti-nausea medication    6. Your constipation  -You're moving your bowels regularly with 1 senna three times a day and Miralax or an enema as needed  -You take pro-biotics twice a day    7.  Your leg swelling  -You have symmetric swelling in your legs  -This may be due to or worsened by your dexamethasone so should improve as we taper you off this medication  -I'm ordering doppler ultrasounds of your legs to make sure you don't have blood clots  -You can try using compression stockings for comfort which you can buy at your local pharmacy    8. Your itching  -You have dry skin which is more bothersome to you over the winter when the air is dry  -Continue applying moisturizer consistently after you shower   -You are going to see your dermatologist for a routine skin exam in a few weeks    7. Your cancer  -You've decided to stop chemo to concentrate on your quality of life    This is what you have shared with us about [address]. Planning 6/14/2017   Patient's Healthcare Decision Maker is: Named in scanned ACP document   Primary Decision Maker Name Feli Baird   Primary Decision Maker Phone Number [phone]   Primary Decision Maker Relationship to Patient Spouse   Secondary Decision Maker Name -   Secondary Decision Maker Relationship to Patient -   Confirm Advance Directive Yes, on file   Does the patient have other document types Do Not Resuscitate       The Palliative Medicine Team is here to support you and your family. We will see you again in 4 weeks. Our Nurse Navigator Raj Baumann or Chandler Vazquez will check in with you by phone before that time. If there are any concerns before that time, such as medication questions, worsening symptoms or a need to see a physician for an urgent or emergent situation; please call [phone] (COPE). A physician is also on call after our normal business hours of 8am to 5pm.     In order to serve you better, please allow up to 48 hours for prescription refills to be processed. Certain medications may require more paperwork or a written prescription that you may need to  from the office. We appreciate you letting us know of any refill requests as soon as possible.     We also would like you to sign up for [employer] as well.    Sincerely,      Dougie Briggs MD and the Palliative Medicine Team

## 2017-12-04 NOTE — PROGRESS NOTES
Palliative Medicine Outpatient Services  Bart: [phone] ([phone])    Patient Name: Prema Roberto  YOB: 1955    Date of Current Visit: 12/04/17  Location of Current Visit:    [x] [facility] Office  [] [facility] Office  [] [facility] Office  [] Home  [] Other:      Date of Initial Visit: 5/12/17  Requesting Physician: Citlalli Calderon MD  Primary Care Physician: Leandra Welsh DO      SUMMARY:   Prema Roberto is a 60y.o. year old with a past history of pancreatic cancer, who was referred to Palliative Medicine by Dr. Ivana Mejia for symptom management and supportive care. She was diagnosed in 5/2017 after months of progressive abdominal pain, fatigue and weight loss. She was treated with gemcitabine and abraxane which was stopped early due to patient preference. She declines further chemotherapy and is focusing upon maintaining quality of life. The patients social history includes: she's been  to [place] for 36 years. They have no children. She is a . She has a brother living in [place] and a sister in [place]. She has a personal relationship with God. She loves reading, biking, pool, documentaries, and history. Palliative Medicine is addressing the following current patient/family concerns: abdominal pain, nausea, constipation, poor appetite with early satiety, fatigue. PALLIATIVE DIAGNOSES:       ICD-10-CM ICD-9-CM    1. Abdominal pain, generalized R10.84 789.07    2. Neoplastic malignant related fatigue R53.0 780.79    3. Poor appetite R63.0 783.0    4. Depression, unspecified depression type F32.9 311    5. Nausea without vomiting R11.0 787.02    6. Drug-induced constipation K59.03 564.09      E980.5    7. Leg swelling M79.89 729.81    8. Malignant neoplasm of body of pancreas (HCC) C25.1 157.1           PLAN:     Patient Instructions     Dear Prema Roberto ,    It was a pleasure seeing you in the Palliative Medicine Office today. This is what we talked about:     1.  Your pain  -Your pain is under great control!  -Continue long-acting morphine 30-mg every 8 hours  -Continue oxycodone 15-mg every 3 to 4 hours as needed. You typically take 6 a day which works well for you.  -You have a new pain when you lie on either side but this goes away when you lie on your back or sit up or stand. You don't have this pain during the day. -You could try buying a body pillow to prop yourself up so you can lie on either side without putting all your body weight on that side to see if that helps  -We're continuing your dexamethasone taper with the goal of tapering you completely off over the next 3 to 4 weeks  -Start dexamethasone 1-mg: take 1/2 tab daily for 7 days, then take 1/2 tab every other day for 7 days, then take 1/2 tab on Monday, Wednesday and Friday for 1 week, then stop    2. Your fatigue  -You've been feeling more fatigued   -You've been more active, enjoying going to craft festivals on the weekends with your     3. Your appetite  -You are eating well, enjoying foods that you haven't been able to eat for awhile  -You've been gaining weight which is likely due to the dexamethasone    4. Your mood  -You're feeling great!  -It's important to you to stay positive and to live each day fully   -You are understandably worried about your 's spinal stenosis and possibility of surgery    5. Your nausea  -This hasn't been a recent problem  -Continue ondansetron 4-mg every 8 hours as needed  -I prescribed Phenergan 25-mg suppositories every 6 hours as needed as an additional anti-nausea medication    6. Your constipation  -You're moving your bowels regularly with 1 senna three times a day and Miralax or an enema as needed  -You take pro-biotics twice a day    7.  Your leg swelling  -You have symmetric swelling in your legs  -This may be due to or worsened by your dexamethasone so should improve as we taper you off this medication  -I'm ordering doppler ultrasounds of your legs to make sure you don't have blood clots  -You can try using compression stockings for comfort which you can buy at your local pharmacy    8. Your itching  -You have dry skin which is more bothersome to you over the winter when the air is dry  -Continue applying moisturizer consistently after you shower   -You are going to see your dermatologist for a routine skin exam in a few weeks    7. Your cancer  -You've decided to stop chemo to concentrate on your quality of life    This is what you have shared with us about [address]. Planning 6/14/2017   Patient's Healthcare Decision Maker is: Named in scanned ACP document   Primary Decision Maker Name Barry Moody   Primary Decision Maker Phone Number [phone]   Primary Decision Maker Relationship to Patient Spouse   Secondary Decision Maker Name -   Secondary Decision Maker Relationship to Patient -   Confirm Advance Directive Yes, on file   Does the patient have other document types Do Not Resuscitate       The Palliative Medicine Team is here to support you and your family. We will see you again in 4 weeks. Our Nurse Navigator Tao Tavarez or Andreas Guadarrama will check in with you by phone before that time. If there are any concerns before that time, such as medication questions, worsening symptoms or a need to see a physician for an urgent or emergent situation; please call [phone] ([phone]). A physician is also on call after our normal business hours of 8am to 5pm.     In order to serve you better, please allow up to 48 hours for prescription refills to be processed. Certain medications may require more paperwork or a written prescription that you may need to  from the office. We appreciate you letting us know of any refill requests as soon as possible. We also would like you to sign up for [employer] as well.     Sincerely,      Victoria Armendariz MD and the Palliative Medicine Team      Counseling and Coordination: see plan       GOALS OF CARE / TREATMENT PREFERENCES:   [====Goals of Care====]  GOALS OF CARE:  Patient / health care proxy stated goals: maintain quality of life and keep a positive outlook      TREATMENT PREFERENCES:   Code Status:  [] Attempt Resuscitation       [x] Do Not Attempt Resuscitation    Advance Care Planning:  Advance Care Planning 6/14/2017   Patient's Healthcare Decision Maker is: Named in scanned ACP document   Primary Decision Maker Name Virginie Nielsen   Primary Decision Maker Phone Number [phone]   Primary Decision Maker Relationship to Patient Spouse   Secondary Decision Maker Name -   Secondary Decision Maker Relationship to Patient -   Confirm Advance Directive Yes, on file   Does the patient have other document types Do Not Resuscitate       Other:  (If patient appropriate for POST, consider using PALLPOST smart phrase here)    The palliative care team has discussed with patient / health care proxy about goals of care / treatment preferences for patient.  [====Goals of Care====]         PRESCRIPTIONS GIVEN:     Medications Ordered Today   Medications    dexamethasone (DECADRON) 1 mg tablet     Sig: Take 1/2 tab daily for 7 days, then take 1/2 tab every other day for 7 days, then take 1/2 day Monday, Wednesday and Friday for 7 days, then stop     Dispense:  20 Tab     Refill:  0    oxyCODONE IR (OXY-IR) 15 mg immediate release tablet     Sig: Take 1 tab every 3 to 4 hours as needed for pain     Dispense:  180 Tab     Refill:  0     Patient with pancreatic cancer  C25.9    morphine CR (MS CONTIN) 30 mg CR tablet     Sig: Take 1 Tab by mouth every eight (8) hours. Max Daily Amount: 90 mg. Dispense:  90 Tab     Refill:  0           FOLLOW UP:     Future Appointments  Date Time Provider Ben Borja   12/28/2017 9:30 AM [facility][note 3]           PHYSICIANS INVOLVED IN CARE:   Patient Care Team:  Bryanna Gentile DO as PCP - General (Family Practice)  Horacio Nielsen, RN as Nurse Navigator (Oncology)  Danae Dias Stuart Julio MD (Hematology and Oncology)  Hollie Rodney MD as Physician (Palliative Medicine)       HISTORY:   Nursing documentation from date of visit reviewed. Reviewed patient-completed ESAS and advance care planning form. Reviewed patient record in prescription monitoring program.    CHIEF COMPLAINT: abdominal pain, nausea, constipation, poor appetite    HPI/SUBJECTIVE:    The patient is: [x] Verbal / [] Nonverbal     She has 2 concerns today: she's had more swelling in her legs and ankles and feet. They've been more swollen over the past month and were really bad a few days ago but not so much today. She's also feeling more fatigued. She stooped down to pick something up from the floor the other day and needed help standing back up. She has new pain when she rolls on either side, the pain is on the side she's lying on. It feels like heartburn but it's in a different spot. The pain goes away when she lies on her back or when she sits or stands up. She doesn't have the pain during the day. Her  notices she sometimes sleeps on her side without having the pain. Her skin is itching and her  worried it might be due to her cancer. Her skin usually itches this time of year. She does OK if she uses lotion after right after showering and she hasn't been too good about doing that lately. She also has had a few itchy lesions on her legs since chemo which haven't changed at all. She's going to see her dermatologist for a routine skin check in a few weeks. Has new pain when she rolls on either side, pain is on the side she's lying on. Pain feels like heartburn but it's in a different spot. Goes away when she sits or stands up. Doesn't the pain when she sleeps flat on her back,  notices she sometimes sleeps on her side    She's been more active doing normal things like going to [employer] and other household activities. Her mood is good.  She's feeling a little stressed due to the upcoming holidays. Her appetite is good. She's had no nausea. She occasionally has  heartburn. .  She's moving her bowels regularly with senna and occasional use of Miralax or an enema. Her spirits are good. Clinical Pain Assessment (nonverbal scale for nonverbal patients):   [++++ Clinical Pain Assessment++++]  [++++Pain Severity++++]: Pain: 0  [++++Pain Character++++]: aching abdominal pain; hurts to wear a bra but its not skin pain  [++++Pain Duration++++]: months with increasing severity  [++++Pain Effect++++]: affects energy  [++++Pain Factors++++]: unable to name provoking factors; morphine and oxycodone help  [++++Pain Frequency++++]: constant  [++++Pain Location++++]: lower back across abdomen and underneath both breasts  [++++ Clinical Pain Assessment++++]       FUNCTIONAL ASSESSMENT:     Palliative Performance Scale (PPS):  PPS: 80       PSYCHOSOCIAL/SPIRITUAL SCREENING:     Any spiritual / Pentecostal concerns:  [] Yes /  [x] No    Caregiver Burnout:  [] Yes /  [] No /  [x] No Caregiver Present      Anticipatory grief assessment:   [x] Normal  / [] Maladaptive       ESAS Anxiety: Anxiety: 0    ESAS Depression: Depression: 0       REVIEW OF SYSTEMS:     The following systems were [x] reviewed / [] unable to be reviewed  Systems: constitutional, ears/nose/mouth/throat, respiratory, gastrointestinal, genitourinary, musculoskeletal, integumentary, neurologic, psychiatric, endocrine. Positive findings noted below. Modified ESAS Completed by: patient   Fatigue: 3 Drowsiness: 0   Depression: 0 Pain: 0   Anxiety: 0 Nausea: 0   Anorexia: 0 Dyspnea: 0   Best Well-Being: 3 Constipation: No   Other Problem (Comment): 3          PHYSICAL EXAM:     Wt Readings from Last 3 Encounters:   12/04/17 209 lb 11.2 oz (95.1 kg)   11/08/17 205 lb 9.6 oz (93.3 kg)   10/11/17 197 lb 9.6 oz (89.6 kg)     Blood pressure 149/82, pulse 65, temperature 97.5 °F (36.4 °C), temperature source Oral, resp.  rate 16, height 5' 7\" (1.702 m), weight 209 lb 11.2 oz (95.1 kg), last menstrual period 10/01/2005, SpO2 95 %.   Last bowel movement: See Nursing Note    Constitutional: sitting in chair, appears relaxed and comfortable, upbeat  Eyes: pupils equal, anicteric  ENMT: no nasal discharge, moist mucous membranes  Cardiovascular: regular rhythm, distal pulses intact  Respiratory: breathing not labored, symmetric  Gastrointestinal: soft non-tender, +bowel sounds  Musculoskeletal: no deformity, no tenderness to palpation; trace bilateral, symmetric lower extremity edema  Skin: warm, dry  Neurologic: following commands, moving all extremities  Psychiatric: full affect, no hallucinations  Other:       HISTORY:     Past Medical History:   Diagnosis Date    Allergic cough 10/5/2010    AR (allergic rhinitis)     cough    GERD (gastroesophageal reflux disease)     Tx FOR \"REALLY BAD HEARTBURN\" IN 2013    Nausea & vomiting     PMS (premenstrual syndrome) 12/26/14    IN PAST, WAS SEVERE; MENOPAUSE 10 YRS AGO, PT SAYS    Postmenopause, LMP 49yo, NO HRT 10/5/2010    S/P normal colonoscopy 2004 10/5/2010    Unspecified sleep apnea 12/26/14    USES CPAP      Past Surgical History:   Procedure Laterality Date    DEXA BONE DENSITY STUDY AXIAL  2/2004    Normal; Dr Domingo Hill (prev Gyn)   [address] [address]    benign AUB; negative    ENDOSCOPY, COLON, DIAGNOSTIC  12/26/14    LAST COLONOSCOPY JAN 2014; PRIOR SCOPE 2004    HX RHINOPLASTY  1986    w/ chin implant (mentoplasty)    HX WISDOM TEETH EXTRACTION      AR OUTER EAR SURGERY PROC UNLISTED  1981    excision benign tumor behind right ear      Family History   Problem Relation Age of Onset    Stroke Mother     Heart Disease Mother     Heart Disease Father     Arthritis-rheumatoid Sister     Cancer Brother      skin cancer    HIV/AIDS Brother     Stroke Brother 79     TIA's    Heart Disease Brother 79     CABG    Stroke Maternal Grandmother     Heart Attack Maternal Grandfather     Heart Attack Paternal Grandmother     Heart Attack Paternal Grandfather     Colon Cancer Sister     Anesth Problems Neg Hx       History reviewed, no pertinent family history. Social History   Substance Use Topics    Smoking status: Former Smoker     Years: 6.00     Quit date: 9/27/1980    Smokeless tobacco: Never Used      Comment: used to smoke about 2 packs a week from college 1974 through . [address] Alcohol use No      Comment: very very rare     No Known Allergies   Current Outpatient Prescriptions   Medication Sig    dexamethasone (DECADRON) 2 mg tablet Take 1 Tab by mouth daily. 2 tabs by mouth daily.  diazePAM (VALIUM) 5 mg tablet Take 1 Tab by mouth every eight (8) hours as needed (spasm). Max Daily Amount: 15 mg.    oxyCODONE IR (OXY-IR) 15 mg immediate release tablet Take 1 tab every 3 to 4 hours as needed for pain    morphine CR (MS CONTIN) 30 mg CR tablet Take 1 Tab by mouth every eight (8) hours. Max Daily Amount: 90 mg.    lansoprazole (PREVACID 24HR) 15 mg capsule Take 1 Cap by mouth daily.  lactobacillus rhamnosus gg 10 billion cell (CULTURELLE) 10 billion cell capsule Take 2 Caps by mouth daily.  loratadine (CLARITIN) 10 mg tablet Take 10 mg by mouth.  CRANBERRY FRUIT CONCENTRATE (RE-AZO CRANBERRY PO) Take  by mouth.  polyethylene glycol (MIRALAX) 17 gram/dose powder Take 17 g by mouth as needed.  multivitamin (ONE A DAY) tablet Take 1 Tab by mouth daily.  prochlorperazine (COMPAZINE) 10 mg tablet Take 5 mg by mouth every six (6) hours as needed.  senna-docusate (SENNA PLUS) 8.6-50 mg per tablet Take 2 Tabs by mouth three (3) times daily.  bisacodyl (DULCOLAX) 10 mg suppository Insert 10 mg into rectum daily.  lactulose (CHRONULAC) 10 gram/15 mL solution Take 30 mL by mouth three (3) times daily. No current facility-administered medications for this visit.            LAB DATA REVIEWED:     Lab Results   Component Value Date/Time    WBC 6.2 08/03/2017 09:45 AM HGB 11.2 08/03/2017 09:45 AM    PLATELET 145 28/26/0238 09:45 AM     Lab Results   Component Value Date/Time    Sodium 134 08/03/2017 09:45 AM    Potassium 3.8 08/03/2017 09:45 AM    Chloride 100 08/03/2017 09:45 AM    CO2 27 08/03/2017 09:45 AM    BUN 9 08/03/2017 09:45 AM    Creatinine 0.57 08/03/2017 09:45 AM    Calcium 8.9 08/03/2017 09:45 AM    Magnesium 2.4 07/17/2017 02:42 PM      Lab Results   Component Value Date/Time    AST (SGOT) 23 08/03/2017 09:45 AM    Alk.  phosphatase 81 08/03/2017 09:45 AM    Protein, total 6.5 08/03/2017 09:45 AM    Albumin 3.6 08/03/2017 09:45 AM    Globulin 2.9 08/03/2017 09:45 AM     No results found for: INR, PTMR, PTP, PT1, PT2, APTT   No results found for: IRON, FE, TIBC, IBCT, PSAT, FERR        CONTROLLED SUBSTANCES SAFETY ASSESSMENT (IF ON CONTROLLED SUBSTANCES):     Reviewed opioid safety handout:  [x] Yes   [] No  24 hour opioid dose >150mg morphine equivalent/day:  [] Yes   [x] No  Benzodiazepines:  [] Yes   [x] No  Sleep apnea:  [x] Yes   [] No  Urine Toxicology Testing within last 6 months:  [] Yes   [x] No  History of or new aberrant medication taking behaviors:  [] Yes   [] No            Total time:   Counseling / coordination time:   > 50% counseling / coordination?:

## 2017-12-04 NOTE — MR AVS SNAPSHOT
Visit Information Date & Time Provider Department Dept. Phone Encounter #  
 12/4/2017  9:30 AM Keisha Bennett MD [place] [id number] Upcoming Health Maintenance Date Due Hepatitis C Screening 1955 Pneumococcal 19-64 Highest Risk (1 of 3 - PCV13) 1/20/1973 FOBT Q 1 YEAR AGE 50-75 1/20/2004 PAP AKA CERVICAL CYTOLOGY 10/12/2013 ZOSTER VACCINE AGE 60> 11/20/2013 Influenza Age 5 to Adult 8/1/2017 BREAST CANCER SCRN MAMMOGRAM 12/15/2018 DTaP/Tdap/Td series (2 - Td) 10/5/2020 Allergies as of 12/4/2017  Review Complete On: 12/4/2017 By: Breonna Rubalcava LPN No Known Allergies Current Immunizations  Reviewed on 8/3/2017 Name Date TDAP Vaccine 10/5/2010 Not reviewed this visit Vitals BP Pulse Temp Resp Height(growth percentile) Weight(growth percentile) 149/82 (BP 1 Location: Left arm, BP Patient Position: Sitting) 65 97.5 °F (36.4 °C) (Oral) 16 5' 7\" (1.702 m) 209 lb 11.2 oz (95.1 kg) LMP SpO2 BMI OB Status Smoking Status 10/01/2005 (Approximate) 95% 32.84 kg/m2 Postmenopausal Former Smoker BMI and BSA Data Body Mass Index Body Surface Area  
 32.84 kg/m 2 2.12 m 2 Preferred Pharmacy Pharmacy Name Phone [id number] [place]/ [address] [facility] [phone] Your Updated Medication List  
  
   
This list is accurate as of: 12/4/17 10:29 AM.  Always use your most recent med list.  
  
  
  
  
 bisacodyl 10 mg suppository Commonly known as:  DULCOLAX Insert 10 mg into rectum daily. CLARITIN 10 mg tablet Generic drug:  loratadine Take 10 mg by mouth. CULTURELLE 10 billion cell capsule Generic drug:  lactobacillus rhamnosus gg 10 billion cell Take 2 Caps by mouth daily. dexamethasone 1 mg tablet Commonly known as:  DECADRON Take 1/2 tab daily for 7 days, then take 1/2 tab every other day for 7 days, then take 1/2 day Monday, Wednesday and Friday for 7 days, then stop  
  
 diazePAM 5 mg tablet Commonly known as:  VALIUM Take 1 Tab by mouth every eight (8) hours as needed (spasm). Max Daily Amount: 15 mg.  
  
 lactulose 10 gram/15 mL solution Commonly known as:  Vicente Barrio Take 30 mL by mouth three (3) times daily. lansoprazole 15 mg capsule Commonly known as:  PREVACID 24HR Take 1 Cap by mouth daily. MIRALAX 17 gram/dose powder Generic drug:  polyethylene glycol Take 17 g by mouth as needed. morphine CR 30 mg CR tablet Commonly known as:  MS CONTIN Take 1 Tab by mouth every eight (8) hours. Max Daily Amount: 90 mg. Start taking on:  12/18/2017  
  
 multivitamin tablet Commonly known as:  ONE A DAY Take 1 Tab by mouth daily. oxyCODONE IR 15 mg immediate release tablet Commonly known as:  OXY-IR Take 1 tab every 3 to 4 hours as needed for pain  
  
 prochlorperazine 10 mg tablet Commonly known as:  COMPAZINE Take 5 mg by mouth every six (6) hours as needed. RE-AZO CRANBERRY PO Take  by mouth. SENNA PLUS 8.6-50 mg per tablet Generic drug:  senna-docusate Take 2 Tabs by mouth three (3) times daily. Prescriptions Printed Refills  
 dexamethasone (DECADRON) 1 mg tablet 0 Sig: Take 1/2 tab daily for 7 days, then take 1/2 tab every other day for 7 days, then take 1/2 day Monday, Wednesday and Friday for 7 days, then stop Class: Print  
 oxyCODONE IR (OXY-IR) 15 mg immediate release tablet 0 Sig: Take 1 tab every 3 to 4 hours as needed for pain  
 Class: Print  
 morphine CR (MS CONTIN) 30 mg CR tablet 0 Starting on: 12/18/2017 Sig: Take 1 Tab by mouth every eight (8) hours. Max Daily Amount: 90 mg.  
 Class: Print Route: Oral  
  
To-Do List   
 12/28/2017 9:30 AM  
  Appointment with [facility] 1 at [address] [place] [address] ([phone]) Shower or bathe using soap and water. Do not use deodorant, powder, perfumes, or lotion the day of your exam.  If your prior mammograms were not performed at [facility] 6 please bring films with you or forward prior images 2 days before your procedure. Check in at registration 15min before your appointment time unless you were instructed to do otherwise. A script is not necessary, but if you have one, please bring it on the day of the mammogram or have it faxed to the department. [facility] [phone] [facility] [place]  [phone] [address] [place] [address]  [phone] [facility] [phone] [facility] 8148 [facility] [phone] Patient Instructions Dear Prema Roberto , It was a pleasure seeing you in the Palliative Medicine Office today. This is what we talked about:  
 
1. Your pain 
-Your pain is under great control! 
-Continue long-acting morphine 30-mg every 8 hours 
-Continue oxycodone 15-mg every 3 to 4 hours as needed. You typically take 6 a day which works well for you. 
-You have a new pain when you lie on either side but this goes away when you lie on your back or sit up or stand. You don't have this pain during the day. -You could try buying a body pillow to prop yourself up so you can lie on either side without putting all your body weight on that side to see if that helps 
-We're continuing your dexamethasone taper with the goal of tapering you completely off over the next 3 to 4 weeks 
-Start dexamethasone 1-mg: take 1/2 tab daily for 7 days, then take 1/2 tab every other day for 7 days, then take 1/2 tab on Monday, Wednesday and Friday for 1 week, then stop 2. Your fatigue 
-You've been feeling more fatigued  
-You've been more active, enjoying going to craft festivals on the weekends with your  3. Your appetite 
-You are eating well, enjoying foods that you haven't been able to eat for awhile 
-You've been gaining weight which is likely due to the dexamethasone 4. Your mood -You're feeling great! 
-It's important to you to stay positive and to live each day fully  
-You are understandably worried about your 's spinal stenosis and possibility of surgery 5. Your nausea 
-This hasn't been a recent problem 
-Continue ondansetron 4-mg every 8 hours as needed 
-I prescribed Phenergan 25-mg suppositories every 6 hours as needed as an additional anti-nausea medication 6. Your constipation 
-You're moving your bowels regularly with 1 senna three times a day and Miralax or an enema as needed 
-You take pro-biotics twice a day 7. Your leg swelling 
-You have symmetric swelling in your legs 
-This may be due to or worsened by your dexamethasone so should improve as we taper you off this medication 
-I'm ordering doppler ultrasounds of your legs to make sure you don't have blood clots 
-You can try using compression stockings for comfort which you can buy at your local pharmacy 8. Your itching 
-You have dry skin which is more bothersome to you over the winter when the air is dry 
-Continue applying moisturizer consistently after you shower  
-You are going to see your dermatologist for a routine skin exam in a few weeks 7. Your cancer 
-You've decided to stop chemo to concentrate on your quality of life This is what you have shared with us about Advance Care Planning Advance Care Planning 6/14/2017 Patient's Healthcare Decision Maker is: Named in scanned ACP document Primary Decision Maker Name Vijay Maria Primary Decision Maker Phone Number [phone] Primary Decision Maker Relationship to Patient Spouse Secondary Decision Maker Name - Secondary Decision Maker Relationship to Patient -  
Confirm Advance Directive Yes, on file Does the patient have other document types Do Not Resuscitate The Palliative Medicine Team is here to support you and your family. We will see you again in 4 weeks. Our Nurse Navigator Mary Turner or Mook Alfonso will check in with you by phone before that time. If there are any concerns before that time, such as medication questions, worsening symptoms or a need to see a physician for an urgent or emergent situation; please call [phone] (MEMO). A physician is also on call after our normal business hours of 8am to 5pm.  
 
In order to serve you better, please allow up to 48 hours for prescription refills to be processed. Certain medications may require more paperwork or a written prescription that you may need to  from the office. We appreciate you letting us know of any refill requests as soon as possible. We also would like you to sign up for [employer] as well. Sincerely, Keisha Bennett MD and the Palliative Medicine Team 
 
 
  
Introducing [facility]! Dear Jorge Perez: Thank you for requesting a Small[employer] account. Our records indicate that you already have an active Small[employer] account. You can access your account anytime at [email or web address] Did you know that you can access your hospital and ER discharge instructions at any time in Smallaa? You can also review all of your test results from your hospital stay or ER visit. Additional Information If you have questions, please visit the Frequently Asked Questions section of the Small[employer] website at [email or web address] Remember, Smallaa is NOT to be used for urgent needs. For medical emergencies, dial 911. Now available from your iPhone and Android! Please provide this summary of care documentation to your next provider. Your primary care clinician is listed as Carlos Triana. If you have any questions after today's visit, please call [phone].

## 2017-12-04 NOTE — PROGRESS NOTES
Palliative Medicine Office Visit  Palliative Medicine Nurse Check In  [phone]    Patient Name: Mary Oreilly  YOB: 1955      Date of Office Visit: 12/4/2017      Patient states: \" I continue to have swelling to my legs and ankles I am fatigued and I have trouble sleeping on my sides due to pain\". From Check In Sheet (scanned in Media):  Has a medical provider talked with you about cardiopulmonary resuscitation (CPR)? [x] Yes   [] No   [] Unable to obtain    Nurse reminder to complete or update ACP FlowSheet:    Is ACP on the Problem List?    [x] Yes    [] No  IF ACP Document is ON FILE; Nurse to place ACP on Problem List     Is there an ACP Note in Chart Review/Note? [x] Yes    [] No   If NO: [address] 6/14/2017   Patient's Healthcare Decision Maker is: Named in scanned ACP document   Primary Decision Maker Name Cassy Hunt   Primary Decision Maker Phone Number [phone]   Primary Decision Maker Relationship to Patient Spouse   Secondary Decision Maker Name -   Secondary Decision Maker Relationship to Patient -   Confirm Advance Directive Yes, on file   Does the patient have other document types Do Not Resuscitate       Is there anything that we should know about you as a person in order to provide you the best care possible? No    Have you been to the ER, urgent care clinic since your last visit? [] Yes   [x] No   [] Unable to obtain    Have you been hospitalized since your last visit? [] Yes   [x] No   [] Unable to obtain    Have you seen or consulted any other health care providers outside of the [facility] since your last visit?    [] Yes   [x] No   [] Unable to obtain    Functional status (describe):          Last BM:  12/3/17     accessed (date): 12/4/2017      Bottle review (for opioid pain medication):  Medication 1:  Oxycodone 15mg tab  Date filled: 11/8/17  Directions: 1 tab by mouth 3-4 hrs as needed  # filled: 180  # left: 61  # pills taking per day:6  Last dose taken: 6:20AM    Medication 2:  Morphine Sulf ER  Date filled: 11/19/17  Directions:  1 tab by mouth every 8hrs as needed  # filled: 90  # left: 49  # pills taking per day: 3  Last dose taken: 5AM    Medication 3:   Date filled:   Directions:   # filled:   # left:   # pills taking per day:  Last dose taken:    Medication 4:   Date filled:   Directions:   # filled:   # left:   # pills taking per day:  Last dose taken:

## 2017-12-05 NOTE — PROGRESS NOTES
Call placed to Central Scheduling to order doppler study to bilateral lower extremities. Able to make appt for 12/5/17 for 1100. Pt to arrive at Outpatient Registration by 21 [phone]. Call to Mrs. Bennett and informed of her appt for the following day. She was appreciative.     [facility]  Palliative Medicine

## 2017-12-05 NOTE — PROCEDURES
[facility]  *** FINAL REPORT ***    Name: Jerri Major  MRN: [id number]    Outpatient  : 1955  HIS Order #: [id number]  [address] [facility] Visit #: [id number]  Date: 05 Dec 2017    TYPE OF TEST: Peripheral Venous Testing    REASON FOR TEST  Pain in limb, Limb swelling    Right Leg:-  Deep venous thrombosis:           No  Superficial venous thrombosis:    No  Deep venous insufficiency:        Not examined  Superficial venous insufficiency: Not examined    Left Leg:-  Deep venous thrombosis:           No  Superficial venous thrombosis:    No  Deep venous insufficiency:        Not examined  Superficial venous insufficiency: Not examined      INTERPRETATION/FINDINGS  PROCEDURE:  Color duplex ultrasound imaging of lower extremity veins. FINDINGS:       Right: The common femoral, deep femoral, femoral, popliteal,  posterior tibial, peroneal, and great saphenous are patent and without   evidence of thrombus;  each is fully compressible and there is no  narrowing of the flow channel on color Doppler imaging. Phasic flow  is observed in the common femoral vein. Left:   The common femoral, deep femoral, femoral, popliteal,  posterior tibial, peroneal, and great saphenous are patent and without   evidence of thrombus;  each is fully compressible and there is no  narrowing of the flow channel on color Doppler imaging. Phasic flow  is observed in the common femoral vein. IMPRESSION:  No evidence of right or left lower extremity vein  thrombosis. ADDITIONAL COMMENTS    I have personally reviewed the data relevant to the interpretation of  this  study. TECHNOLOGIST: Amna Jacinto.  Marie Chery  Signed: 2017 11:46 AM    PHYSICIAN: Ritu Stearns MD  Signed: 2017 07:43 AM

## 2017-12-05 NOTE — PROGRESS NOTES
Call placed to Mrs. Bennett to inform of the negative doppler study. Left message for her to call Palliative at her convenience.       Ann Monique RN  Palliative Medicine

## 2017-12-06 NOTE — TELEPHONE ENCOUNTER
Returned call and left a message informing her that her doppler study was negative.     Do Cabrales RN  Palliative Medicine

## 2017-12-18 NOTE — PROGRESS NOTES
Palliative Medicine  Nursing Note  [phone]  Fax [phone]    Patient Name: Vaibhav Cedillo  YOB: [birth year]      Advance Care Planning 6/14/2017   Patient's Healthcare Decision Maker is: Named in scanned ACP document   Primary Decision Maker Name Didier Redding   Primary Decision Maker Phone Number [phone]   Primary Decision Maker Relationship to Patient Spouse   Secondary Decision Maker Name -   Secondary Decision Maker Relationship to Patient -   Confirm Advance Directive Yes, on file   Does the patient have other document types Do Not Resuscitate     Call placed to Mrs. Bennett. She would like to stop taking her Valium 5mg at night as she doesn't feel she needs it anymore and is sleeping very well. She would like to know if she can just stop taking it or should she wean herself off. Dr. Zeina Haas offered that she could cut the pill in half if she wanted to, but felt that she could just stop taking it was well. Returned call to Mrs. Bennett and informed of Dr. Gino Edwards recommendations. She thought she would just stop taking the Valium. Reiterated to call for any questions or concerns.     April Acevedo, GALN, RN, [facility]  Palliative Medicine

## 2018-01-01 ENCOUNTER — NURSE NAVIGATOR (OUTPATIENT)
Dept: PALLATIVE CARE | Age: 63
End: 2018-01-01

## 2018-01-01 ENCOUNTER — TELEPHONE (OUTPATIENT)
Dept: PALLATIVE CARE | Age: 63
End: 2018-01-01

## 2018-01-01 ENCOUNTER — OFFICE VISIT (OUTPATIENT)
Dept: PALLATIVE CARE | Age: 63
End: 2018-01-01

## 2018-01-01 ENCOUNTER — HOSPITAL ENCOUNTER (OUTPATIENT)
Dept: CT IMAGING | Age: 63
Discharge: HOME | End: 2018-01-04
Attending: INTERNAL MEDICINE
Payer: COMMERCIAL

## 2018-01-01 ENCOUNTER — HOSPICE ADMISSION (OUTPATIENT)
Dept: HOSPICE | Facility: HOSPICE | Age: 63
End: 2018-01-01

## 2018-01-01 ENCOUNTER — HOSPITAL ENCOUNTER (OUTPATIENT)
Dept: CT IMAGING | Age: 63
Discharge: HOME | End: 2018-01-05
Attending: INTERNAL MEDICINE

## 2018-01-01 VITALS
OXYGEN SATURATION: 97 % | HEIGHT: 68 IN | RESPIRATION RATE: 18 BRPM | TEMPERATURE: 97.3 F | DIASTOLIC BLOOD PRESSURE: 58 MMHG | SYSTOLIC BLOOD PRESSURE: 134 MMHG | WEIGHT: 212.2 LBS | HEART RATE: 61 BPM | BODY MASS INDEX: 32.16 KG/M2

## 2018-01-01 VITALS
TEMPERATURE: 97.6 F | SYSTOLIC BLOOD PRESSURE: 127 MMHG | OXYGEN SATURATION: 96 % | DIASTOLIC BLOOD PRESSURE: 74 MMHG | RESPIRATION RATE: 18 BRPM | BODY MASS INDEX: 33.26 KG/M2 | WEIGHT: 211.9 LBS | HEART RATE: 68 BPM | HEIGHT: 67 IN

## 2018-01-01 VITALS — WEIGHT: 212 LBS | BODY MASS INDEX: 32.13 KG/M2 | HEIGHT: 68 IN

## 2018-01-01 DIAGNOSIS — C25.1 MALIGNANT NEOPLASM OF BODY OF PANCREAS (HCC): ICD-10-CM

## 2018-01-01 DIAGNOSIS — C25.9 MALIGNANT NEOPLASM OF PANCREAS, UNSPECIFIED LOCATION OF MALIGNANCY (HCC): ICD-10-CM

## 2018-01-01 DIAGNOSIS — C25.9 PANCREATIC CANCER (HCC): ICD-10-CM

## 2018-01-01 DIAGNOSIS — M54.6 MIDLINE THORACIC BACK PAIN, UNSPECIFIED CHRONICITY: ICD-10-CM

## 2018-01-01 DIAGNOSIS — C25.1 MALIGNANT NEOPLASM OF BODY OF PANCREAS (HCC): Primary | ICD-10-CM

## 2018-01-01 DIAGNOSIS — R10.84 ABDOMINAL PAIN, GENERALIZED: Primary | ICD-10-CM

## 2018-01-01 DIAGNOSIS — G89.3 CANCER RELATED PAIN: ICD-10-CM

## 2018-01-01 DIAGNOSIS — M79.89 LEG SWELLING: ICD-10-CM

## 2018-01-01 DIAGNOSIS — K59.03 DRUG-INDUCED CONSTIPATION: ICD-10-CM

## 2018-01-01 DIAGNOSIS — R53.0 NEOPLASTIC MALIGNANT RELATED FATIGUE: ICD-10-CM

## 2018-01-01 DIAGNOSIS — R63.0 POOR APPETITE: ICD-10-CM

## 2018-01-01 LAB
ALBUMIN SERPL-MCNC: 4.2 G/DL (ref 3.6–4.8)
ALBUMIN/GLOB SERPL: 2.3 {RATIO} (ref 1.2–2.2)
ALP SERPL-CCNC: 85 IU/L (ref 39–117)
ALT SERPL-CCNC: 30 IU/L (ref 0–32)
AST SERPL-CCNC: 26 IU/L (ref 0–40)
BILIRUB SERPL-MCNC: 1.4 MG/DL (ref 0–1.2)
BUN SERPL-MCNC: 7 MG/DL (ref 8–27)
BUN/CREAT SERPL: 12 (ref 12–28)
CALCIUM SERPL-MCNC: 9.1 MG/DL (ref 8.7–10.3)
CHLORIDE SERPL-SCNC: 96 MMOL/L (ref 96–106)
CO2 SERPL-SCNC: 30 MMOL/L (ref 18–29)
CREAT SERPL-MCNC: 0.6 MG/DL (ref 0.57–1)
ERYTHROCYTE [DISTWIDTH] IN BLOOD BY AUTOMATED COUNT: 12.7 % (ref 12.3–15.4)
GLOBULIN SER CALC-MCNC: 1.8 G/DL (ref 1.5–4.5)
GLUCOSE SERPL-MCNC: 114 MG/DL (ref 65–99)
HCT VFR BLD AUTO: 35.2 % (ref 34–46.6)
HGB BLD-MCNC: 12.2 G/DL (ref 11.1–15.9)
MCH RBC QN AUTO: 30.1 PG (ref 26.6–33)
MCHC RBC AUTO-ENTMCNC: 34.7 G/DL (ref 31.5–35.7)
MCV RBC AUTO: 87 FL (ref 79–97)
PLATELET # BLD AUTO: 134 X10E3/UL (ref 150–379)
POTASSIUM SERPL-SCNC: 4.7 MMOL/L (ref 3.5–5.2)
PROT SERPL-MCNC: 6 G/DL (ref 6–8.5)
RBC # BLD AUTO: 4.05 X10E6/UL (ref 3.77–5.28)
SODIUM SERPL-SCNC: 137 MMOL/L (ref 134–144)
WBC # BLD AUTO: 5.6 X10E3/UL (ref 3.4–10.8)

## 2018-01-01 PROCEDURE — 71260 CT THORAX DX C+: CPT

## 2018-01-01 PROCEDURE — 74011000258 HC RX REV CODE- 258: Performed by: INTERNAL MEDICINE

## 2018-01-01 PROCEDURE — 72193 CT PELVIS W/DYE: CPT

## 2018-01-01 PROCEDURE — 74011636320 HC RX REV CODE- 636/320: Performed by: INTERNAL MEDICINE

## 2018-01-01 RX ORDER — OXYCODONE HYDROCHLORIDE 15 MG/1
TABLET ORAL
Qty: 180 TAB | Refills: 0 | Status: SHIPPED | OUTPATIENT
Start: 2018-01-01

## 2018-01-01 RX ORDER — OXYCODONE HYDROCHLORIDE 15 MG/1
TABLET ORAL
Qty: 180 TAB | Refills: 0 | Status: SHIPPED | OUTPATIENT
Start: 2018-01-01 | End: 2018-01-01 | Stop reason: SDUPTHER

## 2018-01-01 RX ORDER — CALC/MAG/B COMPLEX/D3/HERB 61
15 TABLET ORAL DAILY
Qty: 30 CAP | Refills: 3 | Status: SHIPPED | OUTPATIENT
Start: 2018-01-01

## 2018-01-01 RX ORDER — DEXAMETHASONE 2 MG/1
2 TABLET ORAL 2 TIMES DAILY
Qty: 30 TAB | Refills: 0 | Status: SHIPPED | OUTPATIENT
Start: 2018-01-01

## 2018-01-01 RX ORDER — MORPHINE SULFATE 15 MG/1
15 TABLET, FILM COATED, EXTENDED RELEASE ORAL EVERY 8 HOURS
Qty: 90 TAB | Refills: 0 | Status: SHIPPED | OUTPATIENT
Start: 2018-01-01 | End: 2018-01-01 | Stop reason: SDUPTHER

## 2018-01-01 RX ORDER — SODIUM CHLORIDE 0.9 % (FLUSH) 0.9 %
10 SYRINGE (ML) INJECTION
Status: COMPLETED | OUTPATIENT
Start: 2018-01-01 | End: 2018-01-01

## 2018-01-01 RX ORDER — MORPHINE SULFATE 30 MG/1
30 TABLET, FILM COATED, EXTENDED RELEASE ORAL EVERY 8 HOURS
Qty: 90 TAB | Refills: 0 | Status: SHIPPED | OUTPATIENT
Start: 2018-01-01

## 2018-01-01 RX ORDER — DEXAMETHASONE 2 MG/1
2 TABLET ORAL
Qty: 30 TAB | Refills: 0 | OUTPATIENT
Start: 2018-01-01 | End: 2018-01-01 | Stop reason: SDUPTHER

## 2018-01-01 RX ORDER — DEXAMETHASONE 2 MG/1
2 TABLET ORAL 2 TIMES DAILY
Qty: 30 TAB | Refills: 0 | Status: SHIPPED | OUTPATIENT
Start: 2018-01-01 | End: 2018-01-01 | Stop reason: SDUPTHER

## 2018-01-01 RX ORDER — MORPHINE SULFATE 15 MG/1
15 TABLET, FILM COATED, EXTENDED RELEASE ORAL EVERY 8 HOURS
Qty: 90 TAB | Refills: 0 | Status: SHIPPED | OUTPATIENT
Start: 2018-01-01

## 2018-01-01 RX ADMIN — SODIUM CHLORIDE 100 ML: 900 INJECTION, SOLUTION INTRAVENOUS at 14:16

## 2018-01-01 RX ADMIN — Medication 10 ML: at 14:16

## 2018-01-01 RX ADMIN — IOPAMIDOL 100 ML: 755 INJECTION, SOLUTION INTRAVENOUS at 14:16

## 2018-01-02 NOTE — TELEPHONE ENCOUNTER
Called patient to advise/confirm upcoming appt with Dr. Johny Suh on  1/3/18    at 11:30am at [facility]. No answer so left voicemail. Also advised to please bring in your Drivers License and Insurance Card with you to appointment as well as any prescription pain medication in the original container with you to appt.

## 2018-01-03 PROBLEM — F33.9 RECURRENT DEPRESSION (HCC): Status: ACTIVE | Noted: 2018-01-01

## 2018-01-03 NOTE — PROGRESS NOTES
Palliative Medicine Outpatient Services  [place]: [phone]    Patient Name: Orlin Jackman  YOB: 1955    Date of Current Visit: 01/03/18  Location of Current Visit:    [x] [facility] Office  [] [facility] Office  [] [address] [facility] [facility] Office  [] Home  [] Other:      Date of Initial Visit: 5/12/17  Requesting Physician: Gordon Boone MD  Primary Care Physician: Niya Haley DO      SUMMARY:   Orlin Jackman is a 60y.o. year old with a past history of pancreatic cancer, who was referred to Palliative Medicine by Dr. Michelle Nair for symptom management and supportive care. She was diagnosed in 5/2017 after months of progressive abdominal pain, fatigue and weight loss. She was treated with gemcitabine and abraxane which was stopped early due to patient preference. She declines further chemotherapy and is focusing upon maintaining quality of life. The patients social history includes: she's been  to [place] for 36 years. They have no children. She is a . She has a brother living in [place] and a sister in [place]. She has a personal relationship with God. She loves reading, biking, pool, documentaries, and history. Palliative Medicine is addressing the following current patient/family concerns: abdominal pain, nausea, constipation, poor appetite with early satiety, fatigue. PALLIATIVE DIAGNOSES:       ICD-10-CM ICD-9-CM    1. Abdominal pain, generalized R10.84 789.07 oxyCODONE IR (OXY-IR) 15 mg immediate release tablet      morphine CR (MS CONTIN) 30 mg CR tablet   2. Neoplastic malignant related fatigue R53.0 780.79    3. Poor appetite R63.0 783.0    4. Drug-induced constipation K59.03 564.09      E980.5    5. Leg swelling M79.89 729.81    6.  Malignant neoplasm of body of pancreas (HCC) C25.1 157.1 oxyCODONE IR (OXY-IR) 15 mg immediate release tablet      morphine CR (MS CONTIN) 30 mg CR tablet          PLAN:     Patient Instructions     Dear Orlin Jackman ,    It was a pleasure seeing you in the Palliative Medicine Office today. This is what we talked about:     1. Your mid-back, left shoulder and left arm pain  -This has increased over the past month and is now interfering with your sleep  -We talked about increasing your long-acting morphine but you wish to defer this for now   -Continue long-acting morphine 30-mg every 8 hours  -Continue oxycodone 15-mg every 3 to 4 hours as needed. You are taking this every 4 hours on the clock. -I'm ordering a CT of the chest and abdomen to determine the current extent of your pancreatic cancer   -I'm referring you for celiac plexus block as this was so successful in controlling your pain earlier this year  -Continue dexamethasone 1-m/2 tab on Monday, Wednesday and Friday for 1 week, then stop    2. Your fatigue  -You've been feeling more fatigued, sleeping for 2 hours, then up for 2 hours, around the clock  -You haven't been as active as you were before [place]    3. Your appetite  -You are eating well, enjoying foods that you haven't been able to eat for awhile  -You've been gaining weight which is likely due to the dexamethasone    4. Your shortness of breath  -You experienced shortness of breath with shallow breathing during a recent course of levofloxacin  -You continue to have some shortness of breath with exertion      5. Your mood  -You're worried about your cancer and who's going to take care of you as you get sicker  -Your  is having some medical problems himself (spinal stenosis) and needs to have back surgery  -It's important to you to have a better idea of how much longer you have    5. Your nausea  -This hasn't been a recent problem  -Continue ondansetron 4-mg every 8 hours as needed  -I prescribed Phenergan 25-mg suppositories every 6 hours as needed as an additional anti-nausea medication    6.  Your constipation  -You're moving your bowels regularly with 1 senna three times a day and Miralax or an enema as needed  -You take pro-biotics twice a day    7. Your leg swelling  -You have symmetric swelling in your legs  -This may be related to the dexamethasone which we are tapering off  -It may also be related to your cancer   -You had doppler ultrasounds of your legs which showed no blood clots  -Continue using compression stockings for comfort    7. Your cancer  -You've decided to stop chemo to concentrate on your quality of life  -You've been thinking about hospice and have already crossed off the list some of the hospices you wouldn't want to help care for you  -We will talk about this again at your next visit after you've have your blood work and your scans    This is what you have shared with us about [address]. Planning 6/14/2017   Patient's [address] is: Named in scanned ACP document   Primary Decision Maker Name Fatou Taylor   Primary Decision Maker Phone Number [phone]   Primary Decision Maker Relationship to Patient Spouse   Secondary Decision Maker Name -   Secondary Decision Maker Relationship to Patient -   Confirm Advance Directive Yes, on file   Does the patient have other document types Do Not Resuscitate       The Palliative Medicine Team is here to support you and your family. We will see you again in 2 weeks. Our Nurse Navigator Margarita Doyle or Abimael Goldman will check in with you by phone before that time. If there are any concerns before that time, such as medication questions, worsening symptoms or a need to see a physician for an urgent or emergent situation; please call [phone] (COPE). A physician is also on call after our normal business hours of 8am to 5pm.     In order to serve you better, please allow up to 48 hours for prescription refills to be processed. Certain medications may require more paperwork or a written prescription that you may need to  from the office. We appreciate you letting us know of any refill requests as soon as possible.     We also would like you to [facility] as well. Sincerely,      Ivette Paul MD and the Palliative Medicine Team      Counseling and Coordination: see plan       GOALS OF CARE / TREATMENT PREFERENCES:   [====Goals of Care====]  GOALS OF CARE:  Patient / health care proxy stated goals: maintain quality of life and keep a positive outlook      TREATMENT PREFERENCES:   Code Status:  [] Attempt Resuscitation       [x] Do Not Attempt Resuscitation    Advance Care Planning:  Advance Care Planning 6/14/2017   Patient's Healthcare Decision Maker is: Named in scanned ACP document   Primary Decision Maker Name Shayy Newell   Primary Decision Maker Phone Number [phone]   Primary Decision Maker Relationship to Patient Spouse   Secondary Decision Maker Name -   Secondary Decision Maker Relationship to Patient -   Confirm Advance Directive Yes, on file   Does the patient have other document types Do Not Resuscitate       Other:  (If patient appropriate for POST, consider using PALLPOST smart phrase here)    The palliative care team has discussed with patient / health care proxy about goals of care / treatment preferences for patient.  [====Goals of Care====]         PRESCRIPTIONS GIVEN:     No orders of the defined types were placed in this encounter. FOLLOW UP:     Future Appointments  Date Time Provider Ben Borja   1/31/2018 11:30 AM Ivette Paul MD [address] [place] [address] IN CARE:   Patient Care Team:  Vonnie Huerta DO as PCP - General (Family Practice)  Evi Araya, RN as Nurse Navigator (Oncology)  Diana Holstein, MD (Hematology and Oncology)  Ivette Paul MD as Physician (Palliative Medicine)       HISTORY:   Nursing documentation from date of visit reviewed. Reviewed patient-completed ESAS and advance care planning form.   Reviewed patient record in prescription monitoring program.    CHIEF COMPLAINT: abdominal pain, nausea, constipation, poor appetite    HPI/SUBJECTIVE:    The patient is: [x] Verbal / [] Nonverbal     She's been having increasing pain in her mid-back which is worse when she tries to lie down flat on her back. This is new since her last visit. She had pain when she would like her left side but could lie on her back comfortably. It's been worse over the past week. She's been taking her morphine 3 times a day and oxycodone every 4 hours on a regular basis (sets clock). Has to sleep sitting in chair    She feels tired all the time,. She's having trouble sleeping due to pain, up 2 hours, then down 2 hours. She's been treated for 2 UTIs in the past month. Her appetite is OK but she's picky. She isn't eating much. She doesn't understand why she's gaining weight    She's moving her bowels    Her legs are still  Swollen. She's using support stockings    She's no longer experiencing skin  itching    She denies feeling depressed but she's worrying about who will take care of her as she gets sicker. She thought she would be gone by now. Her  may need to have back surgery. She's scared that he will need surgery and won't be able to help her. She's been looking into hospices for \"when the time comes. \"    Clinical Pain Assessment (nonverbal scale for nonverbal patients):   [++++ Clinical Pain Assessment++++]  [++++Pain Severity++++]: Pain: 4  [++++Pain Character++++]: aching abdominal pain; hurts to wear a bra but its not skin pain  [++++Pain Duration++++]: months with increasing severity  [++++Pain Effect++++]: affects energy  [++++Pain Factors++++]: unable to name provoking factors; morphine and oxycodone help  [++++Pain Frequency++++]: constant  [++++Pain Location++++]: lower back across abdomen and underneath both breasts  [++++ Clinical Pain Assessment++++]       FUNCTIONAL ASSESSMENT:     Palliative Performance Scale (PPS):  PPS: 80       PSYCHOSOCIAL/SPIRITUAL SCREENING:     Any spiritual / Scientologist concerns:  [] Yes /  [x] No    Caregiver Burnout:  [] Yes /  [] No /  [x] No Caregiver Present      Anticipatory grief assessment:   [x] Normal  / [] Maladaptive       ESAS Anxiety: Anxiety: 0    ESAS Depression: Depression: 0       REVIEW OF SYSTEMS:     The following systems were [x] reviewed / [] unable to be reviewed  Systems: constitutional, ears/nose/mouth/throat, respiratory, gastrointestinal, genitourinary, musculoskeletal, integumentary, neurologic, psychiatric, endocrine. Positive findings noted below. Modified ESAS Completed by: patient   Fatigue: 4 Drowsiness: 1   Depression: 0 Pain: 4   Anxiety: 0 Nausea: 0   Anorexia: 0 Dyspnea: 4   Best Well-Bein Constipation: No   Other Problem (Comment): 4          PHYSICAL EXAM:     Wt Readings from Last 3 Encounters:   18 211 lb 14.4 oz (96.1 kg)   17 209 lb 11.2 oz (95.1 kg)   17 205 lb 9.6 oz (93.3 kg)     Blood pressure 127/74, pulse 68, temperature 97.6 °F (36.4 °C), temperature source Oral, resp. rate 18, height 5' 7\" (1.702 m), weight 211 lb 14.4 oz (96.1 kg), last menstrual period 10/01/2005, SpO2 96 %.   Last bowel movement: See Nursing Note    Constitutional: sitting in chair, appears fatigued  Eyes: pupils equal, anicteric  ENMT: no nasal discharge, moist mucous membranes  Cardiovascular: regular rhythm, distal pulses intact  Respiratory: breathing not labored, symmetric  Gastrointestinal: soft non-tender, +bowel sounds  Musculoskeletal: no deformity, no tenderness to palpation; trace bilateral, symmetric lower extremity edema (wearing compression stockings)  Skin: warm, dry  Neurologic: following commands, moving all extremities  Psychiatric: full affect, no hallucinations  Other:       HISTORY:     Past Medical History:   Diagnosis Date    Allergic cough 10/5/2010    AR (allergic rhinitis)     cough    GERD (gastroesophageal reflux disease)     Tx FOR \"REALLY BAD HEARTBURN\" IN 2013    Nausea & vomiting     PMS (premenstrual syndrome) 13    IN PAST, WAS SEVERE; MENOPAUSE 10 YRS AGO, PT SAYS    Postmenopause, LMP 49yo, NO HRT 10/5/2010    Recurrent depression ([address] 1/3/2018    S/P normal colonoscopy 2004 10/5/2010    Unspecified sleep apnea 12/26/14    USES CPAP      Past Surgical History:   Procedure Laterality Date    DEXA BONE DENSITY STUDY AXIAL  2/2004    Normal; Dr Cheryl Cordon (prev Gyn)   [address] [facility] [address]  32yo    benign AUB; negative    ENDOSCOPY, COLON, DIAGNOSTIC  12/26/14    LAST COLONOSCOPY JAN 2014; PRIOR SCOPE 2004    HX RHINOPLASTY  1986    w/ chin implant (mentoplasty)    HX WISDOM TEETH EXTRACTION      FL OUTER EAR SURGERY PROC UNLISTED  1981    excision benign tumor behind right ear      Family History   Problem Relation Age of Onset   [facility] Stroke Mother     Heart Disease Mother     Heart Disease Father     Arthritis-rheumatoid Sister     Cancer Brother      skin cancer    HIV/AIDS Brother     Stroke Brother 79     TIA's    Heart Disease Brother 79     CABG    Stroke Maternal Grandmother     Heart Attack Maternal Grandfather     Heart Attack Paternal Grandmother     Heart Attack Paternal Grandfather     Colon Cancer Sister     Anesth Problems Neg Hx       History reviewed, no pertinent family history. Social History   Substance Use Topics    Smoking status: Former Smoker     Years: 6.00     Quit date: 9/27/1980    Smokeless tobacco: Never Used      Comment: used to smoke about 2 packs a week from college 1974 through . [address] Alcohol use No      Comment: very very rare     No Known Allergies   Current Outpatient Prescriptions   Medication Sig    dexamethasone (DECADRON) 1 mg tablet Take 1/2 tab daily for 7 days, then take 1/2 tab every other day for 7 days, then take 1/2 day Monday, Wednesday and Friday for 7 days, then stop    oxyCODONE IR (OXY-IR) 15 mg immediate release tablet Take 1 tab every 3 to 4 hours as needed for pain    morphine CR (MS CONTIN) 30 mg CR tablet Take 1 Tab by mouth every eight (8) hours. Max Daily Amount: 90 mg.    lactobacillus rhamnosus gg 10 billion cell (CULTURELLE) 10 billion cell capsule Take 2 Caps by mouth daily.  loratadine (CLARITIN) 10 mg tablet Take 10 mg by mouth.  CRANBERRY FRUIT CONCENTRATE (RE-AZO CRANBERRY PO) Take  by mouth.  diazePAM (VALIUM) 5 mg tablet Take 1 Tab by mouth every eight (8) hours as needed (spasm). Max Daily Amount: 15 mg.    lansoprazole (PREVACID 24HR) 15 mg capsule Take 1 Cap by mouth daily.  polyethylene glycol (MIRALAX) 17 gram/dose powder Take 17 g by mouth as needed.  multivitamin (ONE A DAY) tablet Take 1 Tab by mouth daily.  prochlorperazine (COMPAZINE) 10 mg tablet Take 5 mg by mouth every six (6) hours as needed.  senna-docusate (SENNA PLUS) 8.6-50 mg per tablet Take 2 Tabs by mouth three (3) times daily.  bisacodyl (DULCOLAX) 10 mg suppository Insert 10 mg into rectum daily.  lactulose (CHRONULAC) 10 gram/15 mL solution Take 30 mL by mouth three (3) times daily. No current facility-administered medications for this visit. LAB DATA REVIEWED:     Lab Results   Component Value Date/Time    WBC 6.2 08/03/2017 09:45 AM    HGB 11.2 08/03/2017 09:45 AM    PLATELET 307 20/26/2895 09:45 AM     Lab Results   Component Value Date/Time    Sodium 134 08/03/2017 09:45 AM    Potassium 3.8 08/03/2017 09:45 AM    Chloride 100 08/03/2017 09:45 AM    CO2 27 08/03/2017 09:45 AM    BUN 9 08/03/2017 09:45 AM    Creatinine 0.57 08/03/2017 09:45 AM    Calcium 8.9 08/03/2017 09:45 AM    Magnesium 2.4 07/17/2017 02:42 PM      Lab Results   Component Value Date/Time    AST (SGOT) 23 08/03/2017 09:45 AM    Alk.  phosphatase 81 08/03/2017 09:45 AM    Protein, total 6.5 08/03/2017 09:45 AM    Albumin 3.6 08/03/2017 09:45 AM    Globulin 2.9 08/03/2017 09:45 AM     No results found for: INR, PTMR, PTP, PT1, PT2, APTT   No results found for: IRON, FE, TIBC, IBCT, PSAT, FERR        CONTROLLED SUBSTANCES SAFETY ASSESSMENT (IF ON CONTROLLED SUBSTANCES):     Reviewed opioid safety handout:  [x] Yes   [] No  24 hour opioid dose >150mg morphine equivalent/day:  [] Yes   [x] No  Benzodiazepines:  [] Yes   [x] No  Sleep apnea:  [x] Yes   [] No  Urine Toxicology Testing within last 6 months:  [] Yes   [x] No  History of or new aberrant medication taking behaviors:  [] Yes   [] No            Total time:   Counseling / coordination time:   > 50% counseling / coordination?:

## 2018-01-03 NOTE — PATIENT INSTRUCTIONS
Dear Chon Brothers ,    It was a pleasure seeing you in the Palliative Medicine Office today. This is what we talked about:     1. Your mid-back, left shoulder and left arm pain  -This has increased over the past month and is now interfering with your sleep  -We talked about increasing your long-acting morphine but you wish to defer this for now   -Continue long-acting morphine 30-mg every 8 hours  -Continue oxycodone 15-mg every 3 to 4 hours as needed. You are taking this every 4 hours on the clock. -I'm ordering a CT of the chest and abdomen to determine the current extent of your pancreatic cancer   -I'm referring you for celiac plexus block as this was so successful in controlling your pain earlier this year  -Continue dexamethasone 1-m/2 tab on Monday, Wednesday and Friday for 1 week, then stop    2. Your fatigue  -You've been feeling more fatigued, sleeping for 2 hours, then up for 2 hours, around the clock  -You haven't been as active as you were before     3. Your appetite  -You are eating well, enjoying foods that you haven't been able to eat for awhile  -You've been gaining weight which is likely due to the dexamethasone    4. Your shortness of breath  -You experienced shortness of breath with shallow breathing during a recent course of levofloxacin  -You continue to have some shortness of breath with exertion      5. Your mood  -You're worried about your cancer and who's going to take care of you as you get sicker  -Your  is having some medical problems himself (spinal stenosis) and needs to have back surgery  -It's important to you to have a better idea of how much longer you have    5. Your nausea  -This hasn't been a recent problem  -Continue ondansetron 4-mg every 8 hours as needed  -I prescribed Phenergan 25-mg suppositories every 6 hours as needed as an additional anti-nausea medication    6.  Your constipation  -You're moving your bowels regularly with 1 senna three times a day and Miralax or an enema as needed  -You take pro-biotics twice a day    7. Your leg swelling  -You have symmetric swelling in your legs  -This may be related to the dexamethasone which we are tapering off  -It may also be related to your cancer   -You had doppler ultrasounds of your legs which showed no blood clots  -Continue using compression stockings for comfort    7. Your cancer  -You've decided to stop chemo to concentrate on your quality of life  -You've been thinking about hospice and have already crossed off the list some of the hospices you wouldn't want to help care for you  -We will talk about this again at your next visit after you've have your blood work and your scans    This is what you have shared with us about [address]. Planning 6/14/2017   Patient's [address] is: Named in scanned ACP document   Primary Decision Maker Name Trisha Cano   Primary Decision Maker Phone Number [phone]   Primary Decision Maker Relationship to Patient Spouse   Secondary Decision Maker Name -   Secondary Decision Maker Relationship to Patient -   Confirm Advance Directive Yes, on file   Does the patient have other document types Do Not Resuscitate       The Palliative Medicine Team is here to support you and your family. We will see you again in 2 weeks. Our Nurse Navigator Louise Boo or John Franco will check in with you by phone before that time. If there are any concerns before that time, such as medication questions, worsening symptoms or a need to see a physician for an urgent or emergent situation; please call [phone] (COPE). A physician is also on call after our normal business hours of 8am to 5pm.     In order to serve you better, please allow up to 48 hours for prescription refills to be processed. Certain medications may require more paperwork or a written prescription that you may need to  from the office.  We appreciate you letting us know of any refill requests as soon as possible. We also would like you to sign up for MyChart as well.     Sincerely,      Dylan Goodman MD and the Palliative Medicine Team

## 2018-01-03 NOTE — PROGRESS NOTES
Call placed to IR for CT guided Celiac Plexus block. Order placed in [address] [place] [address]. Call placed to Coordination of care [phone] to schedule block and CT chest and abd. Plexus block is scheduled for Friday at [address] at [facility], and CT is scheduled for 1/4/18 at 1:30pm.  Patient verbalized understanding to arrive at Outpatient registration 30 min prior to CT on 1/4/18 and to arrive 1 hour prior on 1/5/18 for block. Informed her not to eat 4 hours prior to each procedure and a representative will call from CT to provide any other updates. She verbalized understanding. Patient to go to [facility] for Stat CBC without Diff and CMP. Palliative Medicine  Nursing Note  [phone]  Fax [phone]        Clinic Office Visit  Patient Name: Jack Sender  YOB: 1955    1/3/2018      Advance Care Planning 6/14/2017   Patient's Healthcare Decision Maker is: Named in scanned ACP document   Primary Decision Maker Name Roger Barrett   Primary Decision Maker Phone Number [phone]   Primary Decision Maker Relationship to Patient Spouse   Secondary Decision Maker Name -   Secondary Decision Maker Relationship to Patient -   Confirm Advance Directive Yes, on file   Does the patient have other document types Do Not Resuscitate     Call placed to IR for CT guided Celiac Plexus block. Order placed in [address] [place] [address]. Call placed to Coordination of care [phone] to schedule block and CT chest and abd. Plexus block is scheduled for Friday at [address] at [facility], and CT is scheduled for 1/4/18 at 1:30pm.  Patient verbalized understanding to arrive at Outpatient registration 30 min prior to CT on 1/4/18 and to arrive 1 hour prior on 1/5/18 for block. Informed her not to eat 4 hours prior to each procedure and a representative will call from CT to provide any other updates. She verbalized understanding. Patient to go to [facility] for Stat CBC without Diff and CMP.     AVS reviewed with patient, verbalized an understanding of material discussed. Instructions given to patient to call the Palliative Medicine Office at [phone] for any questions or concerns 24 hours a day, 7 days a weeks. Follow up appointment scheduled for 2 weeks.       GAL FieldsN, RN, [facility]  Palliative Medicine

## 2018-01-03 NOTE — MR AVS SNAPSHOT
Visit Information Date & Time Provider Department Dept. Phone Encounter #  
 1/3/2018 11:30 AM Enma Patel MD Palliative [address] [id number] Your Appointments 1/31/2018 11:30 AM  
Follow Up with Enma Patel MD  
Palliative [place] ([facility]) Appt Note: f./u cp0.00, pb0.00, cnc 12/4/17 [address] [place] [address] At [place] [address] [facility] [address]  
[phone]  
  
   
 [address] [place] [address] Upcoming Health Maintenance Date Due Hepatitis C Screening 1955 Pneumococcal 19-64 Highest Risk (1 of 3 - PCV13) 1/20/1973 FOBT Q 1 YEAR AGE 50-75 1/20/2004 PAP AKA CERVICAL CYTOLOGY 10/12/2013 ZOSTER VACCINE AGE 60> 11/20/2013 Influenza Age 5 to Adult 8/1/2017 BREAST CANCER SCRN MAMMOGRAM 12/15/2018 DTaP/Tdap/Td series (2 - Td) 10/5/2020 Allergies as of 1/3/2018  Review Complete On: 1/3/2018 By: Dante Vera LPN No Known Allergies Current Immunizations  Reviewed on 8/3/2017 Name Date TDAP Vaccine 10/5/2010 Not reviewed this visit You Were Diagnosed With   
  
 Codes Comments Abdominal pain, generalized    -  Primary ICD-10-CM: R10.84 ICD-9-CM: 789.07 Neoplastic malignant related fatigue     ICD-10-CM: R53.0 ICD-9-CM: 780.79 Poor appetite     ICD-10-CM: R63.0 ICD-9-CM: 783.0 Drug-induced constipation     ICD-10-CM: K59.03 
ICD-9-CM: 564.09, E980.5 Leg swelling     ICD-10-CM: M79.89 ICD-9-CM: 729.81 Malignant neoplasm of body of pancreas ([facility] [address]     ICD-10-CM: C25.1 ICD-9-CM: 157.1 Vitals BP Pulse Temp Resp Height(growth percentile) Weight(growth percentile) 127/74 (BP 1 Location: Left arm, BP Patient Position: Sitting) 68 97.6 °F (36.4 °C) (Oral) 18 5' 7\" (1.702 m) 211 lb 14.4 oz (96.1 kg) LMP SpO2 BMI OB Status Smoking Status 10/01/2005 (Approximate) 96% 33.19 kg/m2 Postmenopausal Former Smoker BMI and BSA Data Body Mass Index Body Surface Area  
 33.19 kg/m 2 2.13 m 2 Preferred Pharmacy Pharmacy Name Phone [facility][id number] HELEN Carpenter/ Andrea Singh [facility] [phone] Your Updated Medication List  
  
   
This list is accurate as of: 1/3/18 12:47 PM.  Always use your most recent med list.  
  
  
  
  
 bisacodyl 10 mg suppository Commonly known as:  DULCOLAX Insert 10 mg into rectum daily. CLARITIN 10 mg tablet Generic drug:  loratadine Take 10 mg by mouth. CULTURELLE 10 billion cell capsule Generic drug:  lactobacillus rhamnosus gg 10 billion cell Take 2 Caps by mouth daily. dexamethasone 1 mg tablet Commonly known as:  DECADRON Take 1/2 tab daily for 7 days, then take 1/2 tab every other day for 7 days, then take 1/2 day Monday, Wednesday and Friday for 7 days, then stop  
  
 diazePAM 5 mg tablet Commonly known as:  VALIUM Take 1 Tab by mouth every eight (8) hours as needed (spasm). Max Daily Amount: 15 mg.  
  
 lactulose 10 gram/15 mL solution Commonly known as:  Denzel Warner Take 30 mL by mouth three (3) times daily. lansoprazole 15 mg capsule Commonly known as:  PREVACID 24HR Take 1 Cap by mouth daily. MIRALAX 17 gram/dose powder Generic drug:  polyethylene glycol Take 17 g by mouth as needed. morphine CR 30 mg CR tablet Commonly known as:  MS CONTIN Take 1 Tab by mouth every eight (8) hours. Max Daily Amount: 90 mg.  
  
 multivitamin tablet Commonly known as:  ONE A DAY Take 1 Tab by mouth daily. oxyCODONE IR 15 mg immediate release tablet Commonly known as:  OXY-IR Take 1 tab every 3 to 4 hours as needed for pain Start taking on:  1/17/2018  
  
 prochlorperazine 10 mg tablet Commonly known as:  COMPAZINE Take 5 mg by mouth every six (6) hours as needed. RE-AZO CRANBERRY PO Take  by mouth. SENNA PLUS 8.6-50 mg per tablet Generic drug:  senna-docusate Take 2 Tabs by mouth three (3) times daily. Prescriptions Printed Refills  
 oxyCODONE IR (OXY-IR) 15 mg immediate release tablet 0 Starting on: 2018 Sig: Take 1 tab every 3 to 4 hours as needed for pain  
 Class: Print  
 morphine CR (MS CONTIN) 30 mg CR tablet 0 Sig: Take 1 Tab by mouth every eight (8) hours. Max Daily Amount: 90 mg.  
 Class: Print Route: Oral  
  
Patient Instructions Dear Melina Jay , It was a pleasure seeing you in the Palliative Medicine Office today. This is what we talked about:  
 
1. Your mid-back, left shoulder and left arm pain 
-This has increased over the past month and is now interfering with your sleep 
-We talked about increasing your long-acting morphine but you wish to defer this for now  
-Continue long-acting morphine 30-mg every 8 hours 
-Continue oxycodone 15-mg every 3 to 4 hours as needed. You are taking this every 4 hours on the clock. -I'm ordering a CT of the chest and abdomen to determine the current extent of your pancreatic cancer  
-I'm referring you for celiac plexus block as this was so successful in controlling your pain earlier this year 
-Continue dexamethasone 1-m/2 tab on Monday, Wednesday and Friday for 1 week, then stop 2. Your fatigue 
-You've been feeling more fatigued, sleeping for 2 hours, then up for 2 hours, around the clock 
-You haven't been as active as you were before Nathaly 3. Your appetite 
-You are eating well, enjoying foods that you haven't been able to eat for awhile 
-You've been gaining weight which is likely due to the dexamethasone 4. Your shortness of breath 
-You experienced shortness of breath with shallow breathing during a recent course of levofloxacin 
-You continue to have some shortness of breath with exertion 5. Your mood 
-You're worried about your cancer and who's going to take care of you as you get sicker -Your  is having some medical problems himself (spinal stenosis) and needs to have back surgery 
-It's important to you to have a better idea of how much longer you have 5. Your nausea 
-This hasn't been a recent problem 
-Continue ondansetron 4-mg every 8 hours as needed 
-I prescribed Phenergan 25-mg suppositories every 6 hours as needed as an additional anti-nausea medication 6. Your constipation 
-You're moving your bowels regularly with 1 senna three times a day and Miralax or an enema as needed 
-You take pro-biotics twice a day 7. Your leg swelling 
-You have symmetric swelling in your legs 
-This may be related to the dexamethasone which we are tapering off 
-It may also be related to your cancer  
-You had doppler ultrasounds of your legs which showed no blood clots 
-Continue using compression stockings for comfort 7. Your cancer 
-You've decided to stop chemo to concentrate on your quality of life 
-You've been thinking about hospice and have already crossed off the list some of the hospices you wouldn't want to help care for you 
-We will talk about this again at your next visit after you've have your blood work and your scans This is what you have shared with us about Advance Care Planning Advance Care Planning 6/14/2017 Patient's Healthcare Decision Maker is: Named in scanned ACP document Primary Decision Maker Name Sharonda Ko Primary Decision Maker Phone Number [phone] Primary Decision Maker Relationship to Patient Spouse Secondary Decision Maker Name - Secondary Decision Maker Relationship to Patient -  
Confirm Advance Directive Yes, on file Does the patient have other document types Do Not Resuscitate The Palliative Medicine Team is here to support you and your family. We will see you again in 2 weeks. Our Nurse Navigator Josseline Wall or Dany Bingham will check in with you by phone before that time. If there are any concerns before that time, such as medication questions, worsening symptoms or a need to see a physician for an urgent or emergent situation; please call [phone] (COPE). A physician is also on call after our normal business hours of 8am to 5pm.  
 
In order to serve you better, please allow up to 48 hours for prescription refills to be processed. Certain medications may require more paperwork or a written prescription that you may need to  from the office. We appreciate you letting us know of any refill requests as soon as possible. We also would like you to sign up for Roomorama as well. Sincerely, Dee Huerta MD and the Palliative Medicine Team 
 
 
  
Introducing [facility]! Dear Nava Barrow: Thank you for requesting a Roomorama account. Our records indicate that you already have an active Roomorama account. You can access your account anytime at [email or web address] Did you know that you can access your hospital and ER discharge instructions at any time in Roomorama? You can also review all of your test results from your hospital stay or ER visit. Additional Information If you have questions, please visit the Frequently Asked Questions section of the Roomorama website at [email or web address] Remember, Roomorama is NOT to be used for urgent needs. For medical emergencies, dial 911. Now available from your iPhone and Android! Please provide this summary of care documentation to your next provider. Your primary care clinician is listed as Micah Reveles. If you have any questions after today's visit, please call [phone].

## 2018-01-03 NOTE — PROGRESS NOTES
Palliative Medicine Office Visit  Palliative Medicine Nurse Check In  [phone]    Patient Name: Regulo Hays  YOB: 1955      Date of Office Visit:  1/3/2018      Patient states: \" I have been treated for 2 urinary tract infections I had a bad reaction to the Levaquin\". From Check In Sheet (scanned in Media):  Has a medical provider talked with you about cardiopulmonary resuscitation (CPR)? [x] Yes   [] No   [] Unable to obtain    Nurse reminder to complete or update ACP FlowSheet:    Is ACP on the Problem List?    [x] Yes    [] No  IF ACP Document is ON FILE; Nurse to place ACP on Problem List     Is there an ACP Note in Chart Review/Note? [x] Yes    [] No   If NO: [address] 6/14/2017   Patient's Healthcare Decision Maker is: Named in scanned ACP document   Primary Decision Maker Name Merrill Zurita   Primary Decision Maker Phone Number [phone]   Primary Decision Maker Relationship to Patient Spouse   Secondary Decision Maker Name -   Secondary Decision Maker Relationship to Patient -   Confirm Advance Directive Yes, on file   Does the patient have other document types Do Not Resuscitate       Is there anything that we should know about you as a person in order to provide you the best care possible? \" I am having increased pain to the left arm I can't sleep\". Have you been to the ER, urgent care clinic since your last visit? [] Yes   [x] No   [] Unable to obtain    Have you been hospitalized since your last visit? [] Yes   [x] No   [] Unable to obtain    Have you seen or consulted any other health care providers outside of the [address] [place] [address] since your last visit?    [] Yes   [x] No   [] Unable to obtain    Functional status (describe):         Last BM:  1/2/18     accessed (date): 1/2/17    Bottle review (for opioid pain medication):  Medication 1:  Morphine Sulf ER 30 mg tab  Date filled: 12/18/17  Directions: 1 tab by mouth every 8hrs   # filled: 90  # left: 49  # pills taking per day: 3  Last dose taken: 5:30AM    Medication 2: Oxycodone IR 15mg tab  Date filled: 12/5/17  Directions: 1 tab by mouth every 3-4 hrs as needed  # filled: 180  # left: 75  # pills taking per day: 6  Last dose taken: 9:30AM    Medication 3:   Date filled:   Directions:   # filled:   # left:   # pills taking per day:  Last dose taken:    Medication 4:   Date filled:   Directions:   # filled:   # left:   # pills taking per day:  Last dose taken:

## 2018-01-04 NOTE — TELEPHONE ENCOUNTER
Nurse returned call to Jean Marie Benoit in advanced radiology. Jean Marie Benoit says Dr. Isabella Barrios wants Dr. Eusebio Fraga to know that pt had CT today and it shows significant progression of disease. Dr. Isabella Barrios also wants Dr. Eusebio Fraga to know that based on the increased symptom burden, pt is likely to not achieve the level of success that she had with her  plexus block in June. Plexus block is scheduled for tomorrow at 11AM.      Nurse called Dr. Eusebio Fraga and informed her of above. She says pt really wants to have the block done and she would recommend proceeding with it. Nurse called Jean Marie Benoit back and passed along with Dr. Eusebio Fraga said. Dr. Isabella Barrios is willing to proceed with the block although it likely will not be as successful as pt's previous block. Dr. Eusebio Fraga would like to move pt's next Palliative Medicine appt from Jan. 17 to next week so she can review today's CT scan results with pt. Appt changed from 1/17/18 to 1/10/18 at 12:30pm.  Nurse called to notify patient. No answer, nurse left message regarding change in appt date and time.

## 2018-01-05 NOTE — TELEPHONE ENCOUNTER
Incoming call from Don Raphael in [employer]. She says Ms. Bennett came for her plexus block this AM but she had eaten breakfast so they had to reschedule the block for Tues. Jan. 9.  Pt spoke with Dr. Jorge Alberto Valdez in IR today about her 1/4/18 CT results which showed disease progression. Dr. Jorge Alberto Valdez explained the CT findings. Pt had questions (such as what is her life expectancy) that Dr. Jorge Alberto Valdez told pt she would need to ask Dr. Sylvia Sy. Pt has an appt with Dr. Sylvia Sy in [address] [place] [address] on 1/10/18. However, pt would like to speak to Dr. Sylvia Sy before the plexus block which is re-scheduled for Jan. 9.  Dr. Sylvia Sy is on administrative day today and is off on Mon. Jan. 8.      Nurse sent message to Dr. Sylvia Sy to see if she can speak with pt before 1/9/18.

## 2018-01-05 NOTE — PROGRESS NOTES
Re-order Lansoprazole 15mg 1 capsule by mouth every day #30 with 3 refills per Dr. Rakan Viveros.     Aravind Rodriguez RN  Palliative Medicine

## 2018-01-05 NOTE — TELEPHONE ENCOUNTER
Patient and  calling on procedure that suppose to be done today that was reschedule next Tuesday would like to talk to nurse about the procedure

## 2018-01-05 NOTE — TELEPHONE ENCOUNTER
Pharmacist Darcie Rodriguez requesting call back insurance denied Morphine er 15 plan limit exceeded

## 2018-01-05 NOTE — TELEPHONE ENCOUNTER
Ms. Narda Santos is returning Donato Orozco call regarding appt change from 1/17 to 1/10 at 12:30pm.  Pt confirmed that this is fine.

## 2018-01-05 NOTE — TELEPHONE ENCOUNTER
Adriana Daniels is calling to advise that patient had CT of Chest and  CT of Abdomen, done at [facility] on 1/4/18. Adriana Daniels is calling to see if MD is aware of report or has report. Please call David back to confirm.

## 2018-01-05 NOTE — PROGRESS NOTES
Insurance company approved Prior Authorization request (case number [id number]) for increased dose of MS Contin 45mg every 8 hours. Patient will be able to fill new prescription for the MS Contin 15mg tablets tonight, which will equal her new dose of 45mg when combined with her usual 30mg tablets. .      Returned call placed to [facility] pharmacy at [phone], a different [facility] from her usual, and spoke with Maria E Alcala. She ran the medication through insurance and it went through to fill for tonight. She stated she will call . Nadia Magana to inform him that he can  the medication. Appreciated her assistance.     Марина Christine RN  Palliative Medicine

## 2018-01-05 NOTE — TELEPHONE ENCOUNTER
Incoming call from Dr. Karyle Cara. Dr. Rakan Viveros just spoke with pt and her  on the phone for about 30 minutes about the results of pt's 1/4/18 CT scan and the progression of pt's disease. Dr. Rakan Viveros ordered the following adjustments to pt's meds:    New orders:      Morphine ER 45mg by mouth every 8 hours (increased from 30mg every 8 hrs)  Oxycodone 15mg IR 1-2 tabs every 3-4 hours (increased from 1 tab every 4 hrs)  Dexamethasone 2mg, one tab in the morning and one tab between 2 and 3 pm (increased from one tab daily)    Pt has decided to cancel her appt for the plexus block on 1/9/18. Nurse called pt's  and informed him that he can  the new prescriptions from our office this afternoon. Nurse advised him to come in as soon as he can and drop off prescriptions at the pharmacy in case the insurance company requires a prior authorization we will have time to do that before our office closes.  says he will be here within 45 min.  says that he changed insurance companies effective 1/1/18 and he now has new prescription coverage.

## 2018-01-05 NOTE — TELEPHONE ENCOUNTER
Another call from Radiology that they need order for CT Guided Celiac Block. I have sent what is in [address] [facility]. Faxed to [phone] and [phone].

## 2018-01-05 NOTE — PROGRESS NOTES
Patient in Angio for CT guided celiac block with conscious sedation. Dr. Benedicto Gonzales met with patient and  to discuss results of CT done 1/4/18 and obtain informed consent as to whether to proceed. Patient reports she ate full breakfast at 6:50 am as she was instructed. Procedure cancelled for today and patient to meet with Dr. Yuliya Bautista to discuss further details of CT results and best approach for pain management. Patient will decide, after further discussion with palliative care MD whether to have Celiac block done. Patient given tentative date/time of Tuesday January 9, 2018 at 11:00 am with instructions for NPO and she will notify the department if she decides not to proceed. Patient very emotional during discussion, support given and escorted to vehicle.

## 2018-01-05 NOTE — TELEPHONE ENCOUNTER
Call noted. Insurance company called to initiate Prior Authorization. Please see nurse note for further details.     Morris Hanley RN  Palliative Medicine

## 2018-01-05 NOTE — PROGRESS NOTES
Dexamethasone 2mg daily with breakfast #30 with no refills ordered per Dr. Arpan Aguilar. Patient to have Celiac Plexus block today 1/5/18 at [facility] for increased pain. Recent CT 1/4/18 shows disease progression. Returned call to Columba Meyer in CT and informed her that Dr. Arpan Aguilar is aware of the results of the CT. She verbalized understanding. Patient is scheduled to be seen 1/10/18 with Dr. Arpan Aguilar to discuss results.     Tila Alfaro RN  Palliative Medicine

## 2018-01-05 NOTE — TELEPHONE ENCOUNTER
I called and spoke to Ms. Bennett and her  on speaker phone at her request to review results of her CT scan. She's decided to defer having another celiac plexus block until she and her  meet with me in clinic next week. I've made the following changes to her pain medication regimen:    -Increased long-acting morphine from 30-mg every 8 hours to 45-mg every 8 hours    -Increased oxycodone immediate-release 15-mg to 1 to 2 tabs every 3 to 4 hours as needed    -Restarted dexamethasone 2-mg twice daily    I spoke with Emmie Nieves RN about the above changes.  She will have one of the covering Palliative Medicine physicians write Rxs and will update PM Outpatient High-Risk sign-out list with these changes

## 2018-01-08 NOTE — TELEPHONE ENCOUNTER
Called patient to advise/confirm upcoming appt with Dr. Jim Tan on 1/10/18     at 12:30pm at [facility]. No answer so left voicemail. Also advised to please bring in your Drivers License and Insurance Card with you to appointment as well as any prescription pain medication in the original container with you to appt.

## 2018-01-10 NOTE — PROGRESS NOTES
Palliative Medicine Outpatient Services  [place]: [phone] ([phone])    Patient Name: Memo Diana  YOB: 1955    Date of Current Visit: 01/10/18  Location of Current Visit:    [x] [facility] Office  [] [facility] Office  [] [address] [facility] Office  [] Home  [] Other:      Date of Initial Visit: 5/12/17  Requesting Physician: Helder Adams MD  Primary Care Physician: Khadar Jenkins DO      SUMMARY:   Memo Diana is a 60y.o. year old with a past history of pancreatic cancer, who was referred to Palliative Medicine by Dr. Kaylynn Devlin for symptom management and supportive care. She was diagnosed in 5/2017 after months of progressive abdominal pain, fatigue and weight loss. She was treated with gemcitabine and abraxane which was stopped early due to patient preference. She declines further chemotherapy and is focusing upon maintaining quality of life. The patients social history includes: she's been  to [place] for 36 years. They have no children. She is a . She has a brother living in [place] and a sister in [place]. She has a personal relationship with God. She loves reading, biking, pool, documentaries, and history. Palliative Medicine is addressing the following current patient/family concerns: abdominal pain, nausea, constipation, poor appetite with early satiety, fatigue. PALLIATIVE DIAGNOSES:       ICD-10-CM ICD-9-CM    1. Abdominal pain, generalized R10.84 789.07    2. Midline thoracic back pain, unspecified chronicity M54.6 724.1    3. Neoplastic malignant related fatigue R53.0 780.79    4. Poor appetite R63.0 783.0    5. Drug-induced constipation K59.03 564.09      E980.5    6. Leg swelling M79.89 729.81    7. Malignant neoplasm of pancreas, unspecified location of malignancy ([facility] [address] C25.9 157.9           PLAN:     Patient Instructions     Dear Memo Diana ,    It was a pleasure seeing you in the Palliative Medicine Office today. This is what we talked about:     1.  Your mid-back, left shoulder and left arm pain  -Your pain is much better with the changes we made in your pain medications  -Continue long-acting morphine 45-mg every 8 hours  -Continue oxycodone 15-mg 1 to 2 tabs every 3 to 4 hours as needed.   -Continue dexamethasone 2-mg twice daily    2. Your fatigue  -You are sleeping better with your pain under better control  -We talked about getting a body pillow to help prop yourself up when you sleep    3. Your appetite  -You are eating well  -You don't have to avoid any foods unless they don't agree with you    4. Your mood  -You're worried about your cancer and who's going to take care of you as you get sicker  -Your  is having some medical problems himself (spinal stenosis) and needs to have back surgery  -It's important to you to have a better idea of how much longer you have    5. Your nausea  -This hasn't been a recent problem  -Continue ondansetron 4-mg every 8 hours as needed  -I prescribed Phenergan 25-mg suppositories every 6 hours as needed as an additional anti-nausea medication    6. Your constipation  -You're moving your bowels regularly with 1 senna three times a day and Miralax or an enema as needed  -You take pro-biotics twice a day    7. Your leg swelling  -This is related to your cancer   -The dexamethasone may be a contributing factor but I recommend we continue the steroids because they help so much with your pain    7.  Your cancer  -We talked about getting hospice involved and you are going to call a few hospices to get started with this    This is what you have shared with us about [address] Planning 6/14/2017   Patient's Healthcare Decision Maker is: Named in scanned ACP document   Primary Decision Maker Name Amelia Sanders   Primary Decision Maker Phone Number [phone]   Primary Decision Maker Relationship to Patient Spouse   Secondary Decision Maker Name -   Secondary Decision Maker Relationship to Patient -   Confirm Advance Directive Yes, on file   Does the patient have other document types Do Not Resuscitate       The Palliative Medicine Team is here to support you and your family. Our Nurse Navigator Elaine Gallegos or Radha Mancera will check in with you by phone before that time. If there are any concerns before that time, such as medication questions, worsening symptoms or a need to see a physician for an urgent or emergent situation; please call [phone] (COPE). A physician is also on call after our normal business hours of 8am to 5pm.     In order to serve you better, please allow up to 48 hours for prescription refills to be processed. Certain medications may require more paperwork or a written prescription that you may need to  from the office. We appreciate you letting us know of any refill requests as soon as possible. We also would like you to sign up for [employer] as well.     Sincerely,      Maria De Jesus Henriquez MD and the Palliative Medicine Team       Counseling and Coordination: see plan       GOALS OF CARE / TREATMENT PREFERENCES:   [====Goals of Care====]  GOALS OF CARE:  Patient / health care proxy stated goals: maintain quality of life and keep a positive outlook      TREATMENT PREFERENCES:   Code Status:  [] Attempt Resuscitation       [x] Do Not Attempt Resuscitation    Advance Care Planning:  Advance Care Planning 6/14/2017   Patient's Healthcare Decision Maker is: Named in scanned ACP document   Primary Decision Maker Name Daniela Lehman   Primary Decision Maker Phone Number [phone]   Primary Decision Maker Relationship to Patient Spouse   Secondary Decision Maker Name -   Secondary Decision Maker Relationship to Patient -   Confirm Advance Directive Yes, on file   Does the patient have other document types Do Not Resuscitate       Other:  (If patient appropriate for POST, consider using PALLPOST smart phrase here)    The palliative care team has discussed with patient / health care proxy about goals of care / treatment preferences for patient.  [====Goals of Care====]         PRESCRIPTIONS GIVEN:     No orders of the defined types were placed in this encounter. FOLLOW UP:     Future Appointments  Date Time Provider Ben Borja   1/31/2018 11:30 AM Edwin Puga MD [address] [place] [address] IN CARE:   Patient Care Team:  Vonda Vázquez DO as PCP - General (Family Practice)  Ashok Rao RN as Nurse Navigator (Oncology)  Sylwia Hernandez MD (Hematology and Oncology)  Edwin Puga MD as Physician (Palliative Medicine)       HISTORY:   Nursing documentation from date of visit reviewed. Reviewed patient-completed ESAS and advance care planning form. Reviewed patient record in prescription monitoring program.    CHIEF COMPLAINT: abdominal pain, nausea, constipation, poor appetite    HPI/SUBJECTIVE:    The patient is: [x] Verbal / [] Nonverbal     Her pain so much better since starting the dexamethasone and increasing the morphine. She's now taking 2 oxycodone about 3 times a day. She's sleeping better, not having to get up to take her pain medication overnight. She went to see Dr. Matt Elliott last week about getting another celiac plexus block. She couldn't get it that day because she had eaten. He was uncertain if the block would provide any additional relief (maybe 50-50) because her cancer had spread. She's decided not to have the block since her pain is so much better now with medication. Her energy is much better. Her appetite is good. She's a picky eater, though. She's had no nausea. She's moving her bowels. She's no longer having any pain when she goes to the bathroom (urinates). The swelling in her legs seems to be better. She's still wearing the support hose.     She's no longer experiencing skin  itching    Clinical Pain Assessment (nonverbal scale for nonverbal patients):   [++++ Clinical Pain Assessment++++]  [++++Pain Severity++++]: Pain: 0  [++++Pain Character++++]: aching abdominal pain; hurts to wear a bra but its not skin pain  [++++Pain Duration++++]: months with increasing severity  [++++Pain Effect++++]: affects energy  [++++Pain Factors++++]: unable to name provoking factors; morphine and oxycodone help  [++++Pain Frequency++++]: constant  [++++Pain Location++++]: lower back across abdomen and underneath both breasts  [++++ Clinical Pain Assessment++++]       FUNCTIONAL ASSESSMENT:     Palliative Performance Scale (PPS):  PPS: 80       PSYCHOSOCIAL/SPIRITUAL SCREENING:     Any spiritual / Sabianism concerns:  [] Yes /  [x] No    Caregiver Burnout:  [] Yes /  [] No /  [x] No Caregiver Present      Anticipatory grief assessment:   [x] Normal  / [] Maladaptive       ESAS Anxiety: Anxiety: 0    ESAS Depression: Depression: 0       REVIEW OF SYSTEMS:     The following systems were [x] reviewed / [] unable to be reviewed  Systems: constitutional, ears/nose/mouth/throat, respiratory, gastrointestinal, genitourinary, musculoskeletal, integumentary, neurologic, psychiatric, endocrine. Positive findings noted below. Modified ESAS Completed by: patient   Fatigue: 4 Drowsiness: 0   Depression: 0 Pain: 0   Anxiety: 0 Nausea: 0   Anorexia: 0 Dyspnea: 0   Best Well-Bein Constipation: No   Other Problem (Comment): 4          PHYSICAL EXAM:     Wt Readings from Last 3 Encounters:   01/10/18 212 lb 3.2 oz (96.3 kg)   18 212 lb (96.2 kg)   18 211 lb 14.4 oz (96.1 kg)     Blood pressure 134/58, pulse 61, temperature 97.3 °F (36.3 °C), temperature source Oral, resp. rate 18, height 5' 8\" (1.727 m), weight 212 lb 3.2 oz (96.3 kg), last menstrual period 10/01/2005, SpO2 97 %.   Last bowel movement: See Nursing Note    Constitutional: sitting in chair, appears fatigued  Eyes: pupils equal, anicteric  ENMT: no nasal discharge, moist mucous membranes  Cardiovascular: regular rhythm, distal pulses intact  Respiratory: breathing not labored, symmetric  Gastrointestinal: soft non-tender, +bowel sounds  Musculoskeletal: no deformity, no tenderness to palpation; trace bilateral, symmetric lower extremity edema (wearing compression stockings)  Skin: warm, dry  Neurologic: following commands, moving all extremities  Psychiatric: full affect, no hallucinations  Other:       HISTORY:     Past Medical History:   Diagnosis Date    Allergic cough 10/5/2010    AR (allergic rhinitis)     cough    GERD (gastroesophageal reflux disease)     Tx FOR \"REALLY BAD HEARTBURN\" IN 2013    Nausea & vomiting     PMS (premenstrual syndrome) 12/26/14    IN PAST, WAS SEVERE; MENOPAUSE 10 YRS AGO, PT SAYS    Postmenopause, LMP 49yo, NO HRT 10/5/2010    Recurrent depression ([address] 1/3/2018    S/P normal colonoscopy 2004 10/5/2010    Unspecified sleep apnea 12/26/14    USES CPAP      Past Surgical History:   Procedure Laterality Date    DEXA BONE DENSITY STUDY AXIAL  2/2004    Normal; Dr Vikram Eastman (prev Gyn)    DILATION AND CURETTAGE  32yo    benign AUB; negative    ENDOSCOPY, COLON, DIAGNOSTIC  12/26/14    LAST COLONOSCOPY JAN 2014; PRIOR SCOPE 2004    HX RHINOPLASTY  1986    w/ chin implant (mentoplasty)    HX WISDOM TEETH EXTRACTION      IA OUTER EAR SURGERY PROC UNLISTED  1981    excision benign tumor behind right ear      Family History   Problem Relation Age of Onset    Stroke Mother     Heart Disease Mother     Heart Disease Father     Arthritis-rheumatoid Sister     Cancer Brother      skin cancer    HIV/AIDS Brother     Stroke Brother 79     TIA's    Heart Disease Brother 79     CABG    Stroke Maternal Grandmother     Heart Attack Maternal Grandfather     Heart Attack Paternal Grandmother     Heart Attack Paternal Grandfather     Colon Cancer Sister     Anesth Problems Neg Hx       History reviewed, no pertinent family history.   Social History   Substance Use Topics    Smoking status: Former Smoker     Years: 6.00     Quit date: 9/27/1980    Smokeless tobacco: Never Used      Comment: used to smoke about 2 packs a week from college 1974 through . [address] Alcohol use No      Comment: very very rare     No Known Allergies   Current Outpatient Prescriptions   Medication Sig    morphine CR (MS CONTIN) 15 mg CR tablet Take 1 Tab by mouth every eight (8) hours. Max Daily Amount: 45 mg.    oxyCODONE IR (OXY-IR) 15 mg immediate release tablet Take 1-2 tabs every 3 to 4 hours as needed for pain    dexamethasone (DECADRON) 2 mg tablet Take 1 Tab by mouth two (2) times a day. Take one tablet with breakfast and one tablet between 2 and 3 pm daily.  lansoprazole (PREVACID 24HR) 15 mg capsule Take 1 Cap by mouth daily.  morphine CR (MS CONTIN) 30 mg CR tablet Take 1 Tab by mouth every eight (8) hours. Max Daily Amount: 90 mg.    lactobacillus rhamnosus gg 10 billion cell (CULTURELLE) 10 billion cell capsule Take 2 Caps by mouth daily.  loratadine (CLARITIN) 10 mg tablet Take 10 mg by mouth.  CRANBERRY FRUIT CONCENTRATE (RE-AZO CRANBERRY PO) Take  by mouth.  diazePAM (VALIUM) 5 mg tablet Take 1 Tab by mouth every eight (8) hours as needed (spasm). Max Daily Amount: 15 mg.  polyethylene glycol (MIRALAX) 17 gram/dose powder Take 17 g by mouth as needed.  multivitamin (ONE A DAY) tablet Take 1 Tab by mouth daily.  prochlorperazine (COMPAZINE) 10 mg tablet Take 5 mg by mouth every six (6) hours as needed.  senna-docusate (SENNA PLUS) 8.6-50 mg per tablet Take 2 Tabs by mouth three (3) times daily.  bisacodyl (DULCOLAX) 10 mg suppository Insert 10 mg into rectum daily.  lactulose (CHRONULAC) 10 gram/15 mL solution Take 30 mL by mouth three (3) times daily. No current facility-administered medications for this visit.            LAB DATA REVIEWED:     Lab Results   Component Value Date/Time    WBC 5.6 01/03/2018 01:28 PM    HGB 12.2 01/03/2018 01:28 PM    PLATELET 543 32/27/0286 01:28 PM     Lab Results   Component Value Date/Time Sodium 137 01/03/2018 01:28 PM    Potassium 4.7 01/03/2018 01:28 PM    Chloride 96 01/03/2018 01:28 PM    CO2 30 01/03/2018 01:28 PM    BUN 7 01/03/2018 01:28 PM    Creatinine 0.60 01/03/2018 01:28 PM    Calcium 9.1 01/03/2018 01:28 PM    Magnesium 2.4 07/17/2017 02:42 PM      Lab Results   Component Value Date/Time    AST (SGOT) 26 01/03/2018 01:28 PM    Alk.  phosphatase 85 01/03/2018 01:28 PM    Protein, total 6.0 01/03/2018 01:28 PM    Albumin 4.2 01/03/2018 01:28 PM    Globulin 2.9 08/03/2017 09:45 AM     No results found for: INR, PTMR, PTP, PT1, PT2, APTT   No results found for: IRON, FE, TIBC, IBCT, PSAT, FERR        CONTROLLED SUBSTANCES SAFETY ASSESSMENT (IF ON CONTROLLED SUBSTANCES):     Reviewed opioid safety handout:  [x] Yes   [] No  24 hour opioid dose >150mg morphine equivalent/day:  [] Yes   [x] No  Benzodiazepines:  [] Yes   [x] No  Sleep apnea:  [x] Yes   [] No  Urine Toxicology Testing within last 6 months:  [] Yes   [x] No  History of or new aberrant medication taking behaviors:  [] Yes   [] No            Total time:   Counseling / coordination time:   > 50% counseling / coordination?:

## 2018-01-10 NOTE — PROGRESS NOTES
Palliative Medicine Office Visit  Palliative Medicine Nurse Check In  [phone])    Patient Name: Sheeba Antonio  YOB: 1955      Date of Office Visit: 1/10/2018      Patient states: \" I am here for a follow up appointment. \"    From Check In Sheet (scanned in Media):  Has a medical provider talked with you about cardiopulmonary resuscitation (CPR)? [x] Yes   [] No   [] Unable to obtain    Nurse reminder to complete or update ACP FlowSheet:    Is ACP on the Problem List?    [x] Yes    [] No  IF ACP Document is ON FILE; Nurse to place ACP on Problem List     Is there an ACP Note in Chart Review/Note? [x] Yes    [] No   If NO: [address] 6/14/2017   Patient's Healthcare Decision Maker is: Named in scanned ACP document   Primary Decision Maker Name Amelia Sanders   Primary Decision Maker Phone Number [phone]   Primary Decision Maker Relationship to Patient Spouse   Secondary Decision Maker Name -   Secondary Decision Maker Relationship to Patient -   Confirm Advance Directive Yes, on file   Does the patient have other document types Do Not Resuscitate       Is there anything that we should know about you as a person in order to provide you the best care possible? I can't sleep on my sides. Have you been to the ER, urgent care clinic since your last visit? [] Yes   [x] No   [] Unable to obtain    Have you been hospitalized since your last visit? [] Yes   [x] No   [] Unable to obtain    Have you seen or consulted any other health care providers outside of the [address] [place] [address] since your last visit?    [] Yes   [x] No   [] Unable to obtain    Functional status (describe):       Last BM:  1/10/2018       accessed (date): 1/9/18    Bottle review (for opioid pain medication):  Medication 1: Oxycodone 15mg tab  Date filled: 1/5/18  Directions: 1-2 tabs by mouth every 3-4 hrs as needed  # filled: 180  # left: 195 includes 15 from previous  # pills taking per day: 9  Last dose taken: 9:30AM    Medication 2: Morphine Sulf ER 30mg tab  Date filled: 12/18/18  Directions: 1 tab by mouth every 8hrs  # filled: 90  # left: 31  # pills taking per day: 3  Last dose taken: 5:30AM    Medication 3:  Morphine Sulf ER 15mg tab  Date filled: 1/5/18  Directions: 1 tab by mouth every 8hrs as needed  # filled: 90  # left: 76  # pills taking per day: 3  Last dose taken: 5:30AM    Medication 4:   Date filled:   Directions:   # filled:   # left:   # pills taking per day:  Last dose taken:

## 2018-01-10 NOTE — PATIENT INSTRUCTIONS
Dear Kehinde Hutchinson ,    It was a pleasure seeing you in the Palliative Medicine Office today. This is what we talked about:     1. Your mid-back, left shoulder and left arm pain  -Your pain is much better with the changes we made in your pain medications  -Continue long-acting morphine 45-mg every 8 hours  -Continue oxycodone 15-mg 1 to 2 tabs every 3 to 4 hours as needed.   -Continue dexamethasone 2-mg twice daily    2. Your fatigue  -You are sleeping better with your pain under better control  -We talked about getting a body pillow to help prop yourself up when you sleep    3. Your appetite  -You are eating well  -You don't have to avoid any foods unless they don't agree with you    4. Your mood  -You're worried about your cancer and who's going to take care of you as you get sicker  -Your  is having some medical problems himself (spinal stenosis) and needs to have back surgery  -It's important to you to have a better idea of how much longer you have    5. Your nausea  -This hasn't been a recent problem  -Continue ondansetron 4-mg every 8 hours as needed  -I prescribed Phenergan 25-mg suppositories every 6 hours as needed as an additional anti-nausea medication    6. Your constipation  -You're moving your bowels regularly with 1 senna three times a day and Miralax or an enema as needed  -You take pro-biotics twice a day    7. Your leg swelling  -This is related to your cancer   -The dexamethasone may be a contributing factor but I recommend we continue the steroids because they help so much with your pain    7.  Your cancer  -We talked about getting hospice involved and you are going to call a few hospices to get started with this    This is what you have shared with us about [address] Planning 6/14/2017   Patient's Healthcare Decision Maker is: Named in scanned ACP document   Primary Decision Maker Name Bladimir Black   Primary Decision Maker Phone Number [phone]   Primary Decision Maker Relationship to Patient Spouse   Secondary Decision Maker Name -   Secondary Decision Maker Relationship to Patient -   Confirm Advance Directive Yes, on file   Does the patient have other document types Do Not Resuscitate       The Palliative Medicine Team is here to support you and your family. Our Nurse Navigator Amy Frias or Bernardo Washington will check in with you by phone before that time. If there are any concerns before that time, such as medication questions, worsening symptoms or a need to see a physician for an urgent or emergent situation; please call [phone] (COPE). A physician is also on call after our normal business hours of 8am to 5pm.     In order to serve you better, please allow up to 48 hours for prescription refills to be processed. Certain medications may require more paperwork or a written prescription that you may need to  from the office. We appreciate you letting us know of any refill requests as soon as possible. We also would like you to sign up for [facility] as well.     Sincerely,      Promise Cavazos MD and the Palliative Medicine Team

## 2018-01-10 NOTE — MR AVS SNAPSHOT
Visit Information Date & Time Provider Department Dept. Phone Encounter #  
 1/10/2018 12:30 PM Shmuel Pedro MD Palliative [address] [id number] Your Appointments 1/31/2018 11:30 AM  
Follow Up with Shmuel Pedro MD  
Palliative [place] ([facility]) Appt Note: f./u cp0.00, pb0.00, cnc 12/4/17 [address] [place] [address] [place] [address] [place] [address] [phone]  
  
   
 [address] [place] [address] Upcoming Health Maintenance Date Due Hepatitis C Screening 1955 Pneumococcal 19-64 Highest Risk (1 of 3 - PCV13) 1/20/1973 FOBT Q 1 YEAR AGE 50-75 1/20/2004 PAP AKA CERVICAL CYTOLOGY 10/12/2013 ZOSTER VACCINE AGE 60> 11/20/2013 Influenza Age 5 to Adult 8/1/2017 BREAST CANCER SCRN MAMMOGRAM 12/15/2018 DTaP/Tdap/Td series (2 - Td) 10/5/2020 Allergies as of 1/10/2018  Review Complete On: 1/5/2018 By: Mega Patrick RN No Known Allergies Current Immunizations  Reviewed on 8/3/2017 Name Date TDAP Vaccine 10/5/2010 Not reviewed this visit Vitals BP Pulse Temp Resp Height(growth percentile) Weight(growth percentile) 134/58 (BP 1 Location: Left arm, BP Patient Position: Sitting) 61 97.3 °F (36.3 °C) (Oral) 18 5' 8\" (1.727 m) 212 lb 3.2 oz (96.3 kg) LMP SpO2 BMI OB Status Smoking Status 10/01/2005 (Approximate) 97% 32.26 kg/m2 Postmenopausal Former Smoker BMI and BSA Data Body Mass Index Body Surface Area  
 32.26 kg/m 2 2.15 m 2 Preferred Pharmacy Pharmacy Name Phone [phone] Your Updated Medication List  
  
   
This list is accurate as of: 1/10/18  2:00 PM.  Always use your most recent med list.  
  
  
  
  
 bisacodyl 10 mg suppository Commonly known as:  DULCOLAX Insert 10 mg into rectum daily. CLARITIN 10 mg tablet Generic drug:  loratadine Take 10 mg by mouth. CULTURELLE 10 billion cell capsule Generic drug:  lactobacillus rhamnosus gg 10 billion cell Take 2 Caps by mouth daily. dexamethasone 2 mg tablet Commonly known as:  DECADRON Take 1 Tab by mouth two (2) times a day. Take one tablet with breakfast and one tablet between 2 and 3 pm daily. diazePAM 5 mg tablet Commonly known as:  VALIUM Take 1 Tab by mouth every eight (8) hours as needed (spasm). Max Daily Amount: 15 mg.  
  
 lactulose 10 gram/15 mL solution Commonly known as:  Marisue Natter Take 30 mL by mouth three (3) times daily. lansoprazole 15 mg capsule Commonly known as:  PREVACID 24HR Take 1 Cap by mouth daily. MIRALAX 17 gram/dose powder Generic drug:  polyethylene glycol Take 17 g by mouth as needed. * morphine CR 30 mg CR tablet Commonly known as:  MS CONTIN Take 1 Tab by mouth every eight (8) hours. Max Daily Amount: 90 mg.  
  
 * morphine CR 15 mg CR tablet Commonly known as:  MS CONTIN Take 1 Tab by mouth every eight (8) hours. Max Daily Amount: 45 mg.  
  
 multivitamin tablet Commonly known as:  ONE A DAY Take 1 Tab by mouth daily. oxyCODONE IR 15 mg immediate release tablet Commonly known as:  OXY-IR Take 1-2 tabs every 3 to 4 hours as needed for pain  
  
 prochlorperazine 10 mg tablet Commonly known as:  COMPAZINE Take 5 mg by mouth every six (6) hours as needed. RE-AZO CRANBERRY PO Take  by mouth. SENNA PLUS 8.6-50 mg per tablet Generic drug:  senna-docusate Take 2 Tabs by mouth three (3) times daily. * Notice: This list has 2 medication(s) that are the same as other medications prescribed for you. Read the directions carefully, and ask your doctor or other care provider to review them with you. Patient Instructions Dear Mariia Randall , It was a pleasure seeing you in the Palliative Medicine Office today. This is what we talked about:  
 
1. Your mid-back, left shoulder and left arm pain 
-Your pain is much better with the changes we made in your pain medications 
-Continue long-acting morphine 45-mg every 8 hours 
-Continue oxycodone 15-mg 1 to 2 tabs every 3 to 4 hours as needed.  
-Continue dexamethasone 2-mg twice daily 2. Your fatigue 
-You are sleeping better with your pain under better control 
-We talked about getting a body pillow to help prop yourself up when you sleep 3. Your appetite 
-You are eating well 
-You don't have to avoid any foods unless they don't agree with you 4. Your mood 
-You're worried about your cancer and who's going to take care of you as you get sicker 
-Your  is having some medical problems himself (spinal stenosis) and needs to have back surgery 
-It's important to you to have a better idea of how much longer you have 5. Your nausea 
-This hasn't been a recent problem 
-Continue ondansetron 4-mg every 8 hours as needed 
-I prescribed Phenergan 25-mg suppositories every 6 hours as needed as an additional anti-nausea medication 6. Your constipation 
-You're moving your bowels regularly with 1 senna three times a day and Miralax or an enema as needed 
-You take pro-biotics twice a day 7. Your leg swelling 
-This is related to your cancer  
-The dexamethasone may be a contributing factor but I recommend we continue the steroids because they help so much with your pain 7. Your cancer 
-We talked about getting hospice involved and you are going to call a few hospices to get started with this This is what you have shared with us about Advance Care Planning Advance Care Planning 6/14/2017 Patient's Healthcare Decision Maker is: Named in scanned ACP document Primary Decision Maker Name Altagracia Solis Primary Decision Maker Phone Number [phone] Primary Decision Maker Relationship to Patient Spouse Secondary Decision Maker Name -  
 Secondary Decision Maker Relationship to Patient -  
Confirm Advance Directive Yes, on file Does the patient have other document types Do Not Resuscitate The Palliative Medicine Team is here to support you and your family. Our Nurse Navigator Ana Maria Hairston or Nicky Delgado will check in with you by phone before that time. If there are any concerns before that time, such as medication questions, worsening symptoms or a need to see a physician for an urgent or emergent situation; please call [phone] (COPE). A physician is also on call after our normal business hours of 8am to 5pm.  
 
In order to serve you better, please allow up to 48 hours for prescription refills to be processed. Certain medications may require more paperwork or a written prescription that you may need to  from the office. We appreciate you letting us know of any refill requests as soon as possible. We also would like you to sign up for [employer] as well. Sincerely, Sp Bowman MD and the Palliative Medicine Team 
 
 
  
Introducing [facility]! Dear Tony Swanson: Thank you for requesting a [employer] account. Our records indicate that you already have an active [employer] account. You can access your account anytime at [email or web address] Did you know that you can access your hospital and ER discharge instructions at any time in [employer]? You can also review all of your test results from your hospital stay or ER visit. Additional Information If you have questions, please visit the Frequently Asked Questions section of the [employer] website at [email or web address] Remember, [employer] is NOT to be used for urgent needs. For medical emergencies, dial 911. Now available from your iPhone and Android! Please provide this summary of care documentation to your next provider. Your primary care clinician is listed as Indio Burnett.  If you have any questions after today's visit, please call [phone].

## 2018-01-11 NOTE — PROGRESS NOTES
Returned call to [facility] with [facility] regarding the referral and order for Hospice that was placed in [address] [place] [address]. She was appreciative.     Maximo Alexander RN  Palliative Medicine

## 2018-01-11 NOTE — TELEPHONE ENCOUNTER
Ken Tyson with [address] received call from patient requesting an interview with hospice. She didn't see an order.  Requesting order for Hospice

## 2018-01-12 NOTE — TELEPHONE ENCOUNTER
Returned call to Rakesh Jones, liaison with [facility], who requested an H&P, order, and demographics prior to visiting with . She had called and set up a date and time with them yesterday as she will be interviewing several hospices.     Estelle Patel RN  Palliative Medicine

## 2018-01-23 NOTE — TELEPHONE ENCOUNTER
Patient called to advise Dr. Johny Suh that she interviewed [address] [place] [address] and [facility] yesterday and that patient has chosen to go with [address] [place] [address]. Ms. Cj Barry also has decided to keep Dr. Johny Suh as her MD/provider with hospice.

## 2018-01-24 NOTE — TELEPHONE ENCOUNTER
Patient is calling stating she needs new script for Oxycodone IR, 15 mg . She has been taking 2 tablets (30 mg) every 4 hours for pain. Mrs. Jim Bell has about 3 days left. Please call Mr. Jim Bell at [phone] when script is ready for  at [facility]. Any questions, call Mrs. Bennett.

## 2018-01-30 NOTE — TELEPHONE ENCOUNTER
Crystal with [facility] is calling to advise that patient was admitted with them today and she needs to review medication list and make some changes.

## 2018-01-30 NOTE — TELEPHONE ENCOUNTER
Alisson PAULSON from [facility] called to inform that they have admitted Mrs. Bennett into hospice today. She has several concerns. Mrs. Hal Ornelas is requiring 2 tabs of 15mg Oxycodone every 4 hours around the clock and if she misses a dose or is late, she suffers tremendously for the remainder of the day. She is still taking MS Contin 45mg ( a 30mg and a 15mg tab) every 8 hours. Yoko Velizadiliaarmando is concerned about Brisa strict need to medicate every 4 hours and is wondering if something can be changed to help minimize that and the quantity of pills (18) that are being taken, and also so she doesn't have to set an alarm to take her meds. Mrs. Hal Ornelas is now experiencing a \"dumping syndrome\" after she eats and more noticeable if she eats anything sweet. She is needing to wear a depends and she is having multiple accidents daily and is not wanting to leave home for fear of them. Alisson is asking for something to slow down her stool production if possible. She had a negative effect to the Creon a while back and is concerned about taking Amylase lipase. She is taking a Probiotic daily, and hospice recommended she increase that to twice daily. Alisson can be reached at [phone] and she said tomorrow would be fine for any recommendations or orders. Information provided to Dr. Gregg Martell.     Anival Moreau RN  Palliative Medicine

## 2018-02-12 NOTE — TELEPHONE ENCOUNTER
I returned call to Cassius Huang, she was made aware DR Maurisio Lainez has been notified. She advised to continue to monitor encourage fluids and follow up with if condition worsens. She verbalized understanding of.

## 2018-02-12 NOTE — TELEPHONE ENCOUNTER
Alisson with [facility] wanted to let Dr. Francisca Acosta know patient is having flu symptoms no fever advised plenty of fluids. not sure what Dr. Francisca Acosta wanted to do

## 2019-08-20 NOTE — TELEPHONE ENCOUNTER
Prior auth approved today through 12/6/2017   Case # [id number]  Paid claim went through   Pharmacy and patient aware Sister Sharon calls (on HIPPA form) and states Joanne has been avoiding returning the calls I placed to her to get f/u scheduled. Pt is not c/o of any pain, no f/c. Tatum was advised as per Dr Ann's recommendation that she should f/u with Dr Ann (in [facility] office) in September. Pt should have CT abd/pelvis prior to appt.  is in agreement with this plan and would like to get this scheduled for pt. Will have GB  call pt to get CT scheduled and then f/u appt with Dr Ann. (order in epic for same) .

## 2020-03-05 NOTE — TELEPHONE ENCOUNTER
Call placed to patient, HIPAA verified. Per provider note, recent scan unchanged, patient advised. Verbalized understanding and thanked for update. spontaneous

## 2021-05-24 NOTE — TELEPHONE ENCOUNTER
Returned call to patient, she state she has been having unreleived back pain since last Thursday. Patient taking Morphine 15 mg every 8 hours, Oxycodone IR 15 mg every 4 hours and Dexamethasone 2 mg twice a day. Patient state she is not having any pain in her abdomen just in her back that moves to her sides at this time. Patient rate her pain 6/10. She saw her nutritionist on Friday that they started her on Creon and she feels this helps a lot. Patient want to know what additional can be done for her back pain. Skin normal color for race, warm, dry and intact. No evidence of rash.

## 2022-07-25 NOTE — TELEPHONE ENCOUNTER
Never Patient is a new colon cancer patient and called stating that she has an upcoming appointment with Dr. Tee Perkins (5/19), however patient states she will run out of her pain medication over the weekend and just wanted to know who would be assuming complete care and refilling this medication. Patient had asked for a return call from [employer].  Please call patient at [phone]

## 2023-04-06 NOTE — TELEPHONE ENCOUNTER
Alisson yost/[facility] is calling to get order to increase fentanyl patch. Please call to discuss.
In speaking with Alisson from [facility] she said Joesph Carney is having an increase in her pain requiring her to take 2 oxycodone every 4 hours, so she wanted to know if we should increase her Fentanyl patch from 100 to either 125 or 150.     1410-Dr. Messer would like to increase Ms. Bennett's Fentanyl patch to 125 mcg/hr. Alisson with [facility] was notified and stated she will increase it.
20 gauge/1.0 in length
